# Patient Record
Sex: MALE | Race: WHITE | Employment: OTHER | URBAN - METROPOLITAN AREA
[De-identification: names, ages, dates, MRNs, and addresses within clinical notes are randomized per-mention and may not be internally consistent; named-entity substitution may affect disease eponyms.]

---

## 2019-10-28 ENCOUNTER — OFFICE VISIT (OUTPATIENT)
Dept: ENT CLINIC | Age: 64
End: 2019-10-28
Payer: MEDICARE

## 2019-10-28 VITALS
SYSTOLIC BLOOD PRESSURE: 153 MMHG | HEART RATE: 89 BPM | TEMPERATURE: 99.2 F | BODY MASS INDEX: 34.07 KG/M2 | WEIGHT: 243.4 LBS | DIASTOLIC BLOOD PRESSURE: 93 MMHG | HEIGHT: 71 IN

## 2019-10-28 DIAGNOSIS — D37.030 NEOPLASM OF UNCERTAIN BEHAVIOR OF PAROTID GLAND: Primary | ICD-10-CM

## 2019-10-28 PROCEDURE — G8484 FLU IMMUNIZE NO ADMIN: HCPCS | Performed by: OTOLARYNGOLOGY

## 2019-10-28 PROCEDURE — 1123F ACP DISCUSS/DSCN MKR DOCD: CPT | Performed by: OTOLARYNGOLOGY

## 2019-10-28 PROCEDURE — 99203 OFFICE O/P NEW LOW 30 MIN: CPT | Performed by: OTOLARYNGOLOGY

## 2019-10-28 PROCEDURE — G8427 DOCREV CUR MEDS BY ELIG CLIN: HCPCS | Performed by: OTOLARYNGOLOGY

## 2019-10-28 PROCEDURE — 1036F TOBACCO NON-USER: CPT | Performed by: OTOLARYNGOLOGY

## 2019-10-28 PROCEDURE — 3017F COLORECTAL CA SCREEN DOC REV: CPT | Performed by: OTOLARYNGOLOGY

## 2019-10-28 PROCEDURE — 4040F PNEUMOC VAC/ADMIN/RCVD: CPT | Performed by: OTOLARYNGOLOGY

## 2019-10-28 PROCEDURE — G8417 CALC BMI ABV UP PARAM F/U: HCPCS | Performed by: OTOLARYNGOLOGY

## 2019-10-28 PROCEDURE — 10021 FNA BX W/O IMG GDN 1ST LES: CPT | Performed by: OTOLARYNGOLOGY

## 2019-10-28 RX ORDER — PRAVASTATIN SODIUM 40 MG
40 TABLET ORAL
COMMUNITY
Start: 2019-09-27

## 2019-10-28 RX ORDER — LISINOPRIL AND HYDROCHLOROTHIAZIDE 20; 12.5 MG/1; MG/1
2 TABLET ORAL
COMMUNITY
Start: 2019-09-27 | End: 2021-11-12

## 2019-11-01 ENCOUNTER — TELEPHONE (OUTPATIENT)
Dept: ENT CLINIC | Age: 64
End: 2019-11-01

## 2020-08-09 ENCOUNTER — HOSPITAL ENCOUNTER (EMERGENCY)
Age: 65
Discharge: HOME | End: 2020-08-09
Attending: EMERGENCY MEDICINE
Payer: MEDICARE

## 2020-08-09 ENCOUNTER — APPOINTMENT (OUTPATIENT)
Dept: CT IMAGING | Age: 65
End: 2020-08-09
Payer: MEDICARE

## 2020-08-09 VITALS
HEART RATE: 70 BPM | TEMPERATURE: 98.5 F | DIASTOLIC BLOOD PRESSURE: 81 MMHG | RESPIRATION RATE: 19 BRPM | SYSTOLIC BLOOD PRESSURE: 154 MMHG | OXYGEN SATURATION: 99 %

## 2020-08-09 LAB
ANION GAP SERPL CALCULATED.3IONS-SCNC: 12 MMOL/L (ref 3–16)
BASOPHILS ABSOLUTE: 0.1 K/UL (ref 0–0.2)
BASOPHILS RELATIVE PERCENT: 0.5 %
BUN BLDV-MCNC: 20 MG/DL (ref 7–20)
CALCIUM SERPL-MCNC: 9.3 MG/DL (ref 8.3–10.6)
CHLORIDE BLD-SCNC: 95 MMOL/L (ref 99–110)
CO2: 28 MMOL/L (ref 21–32)
CREAT SERPL-MCNC: 1 MG/DL (ref 0.8–1.3)
EOSINOPHILS ABSOLUTE: 0 K/UL (ref 0–0.6)
EOSINOPHILS RELATIVE PERCENT: 0 %
GFR AFRICAN AMERICAN: >60
GFR NON-AFRICAN AMERICAN: >60
GLUCOSE BLD-MCNC: 160 MG/DL (ref 70–99)
HCT VFR BLD CALC: 46.4 % (ref 40.5–52.5)
HEMOGLOBIN: 16 G/DL (ref 13.5–17.5)
LYMPHOCYTES ABSOLUTE: 1.7 K/UL (ref 1–5.1)
LYMPHOCYTES RELATIVE PERCENT: 14.2 %
MCH RBC QN AUTO: 31.4 PG (ref 26–34)
MCHC RBC AUTO-ENTMCNC: 34.5 G/DL (ref 31–36)
MCV RBC AUTO: 91 FL (ref 80–100)
MONOCYTES ABSOLUTE: 0.5 K/UL (ref 0–1.3)
MONOCYTES RELATIVE PERCENT: 4 %
NEUTROPHILS ABSOLUTE: 9.7 K/UL (ref 1.7–7.7)
NEUTROPHILS RELATIVE PERCENT: 81.3 %
PDW BLD-RTO: 13.7 % (ref 12.4–15.4)
PLATELET # BLD: 267 K/UL (ref 135–450)
PMV BLD AUTO: 7.8 FL (ref 5–10.5)
POTASSIUM REFLEX MAGNESIUM: 3.7 MMOL/L (ref 3.5–5.1)
RBC # BLD: 5.1 M/UL (ref 4.2–5.9)
SODIUM BLD-SCNC: 135 MMOL/L (ref 136–145)
WBC # BLD: 12 K/UL (ref 4–11)

## 2020-08-09 PROCEDURE — 70487 CT MAXILLOFACIAL W/DYE: CPT

## 2020-08-09 PROCEDURE — 36415 COLL VENOUS BLD VENIPUNCTURE: CPT

## 2020-08-09 PROCEDURE — 6360000004 HC RX CONTRAST MEDICATION: Performed by: EMERGENCY MEDICINE

## 2020-08-09 PROCEDURE — 99284 EMERGENCY DEPT VISIT MOD MDM: CPT

## 2020-08-09 PROCEDURE — 85025 COMPLETE CBC W/AUTO DIFF WBC: CPT

## 2020-08-09 PROCEDURE — 80048 BASIC METABOLIC PNL TOTAL CA: CPT

## 2020-08-09 PROCEDURE — 6360000002 HC RX W HCPCS: Performed by: EMERGENCY MEDICINE

## 2020-08-09 PROCEDURE — 96374 THER/PROPH/DIAG INJ IV PUSH: CPT

## 2020-08-09 RX ORDER — ONDANSETRON 2 MG/ML
4 INJECTION INTRAMUSCULAR; INTRAVENOUS EVERY 6 HOURS PRN
Status: DISCONTINUED | OUTPATIENT
Start: 2020-08-09 | End: 2020-08-09 | Stop reason: HOSPADM

## 2020-08-09 RX ORDER — KETOROLAC TROMETHAMINE 30 MG/ML
15 INJECTION, SOLUTION INTRAMUSCULAR; INTRAVENOUS ONCE
Status: COMPLETED | OUTPATIENT
Start: 2020-08-09 | End: 2020-08-09

## 2020-08-09 RX ORDER — NAPROXEN 500 MG/1
500 TABLET ORAL 2 TIMES DAILY WITH MEALS
Qty: 14 TABLET | Refills: 0 | Status: ON HOLD | OUTPATIENT
Start: 2020-08-09 | End: 2022-01-26

## 2020-08-09 RX ADMIN — KETOROLAC TROMETHAMINE 15 MG: 30 INJECTION, SOLUTION INTRAMUSCULAR at 06:20

## 2020-08-09 RX ADMIN — IOPAMIDOL 80 ML: 755 INJECTION, SOLUTION INTRAVENOUS at 07:43

## 2020-08-09 RX ADMIN — ONDANSETRON 4 MG: 2 INJECTION INTRAMUSCULAR; INTRAVENOUS at 06:21

## 2020-08-09 ASSESSMENT — ENCOUNTER SYMPTOMS
WHEEZING: 0
DIARRHEA: 0
NAUSEA: 1
COUGH: 0
FACIAL SWELLING: 1
SHORTNESS OF BREATH: 0
ABDOMINAL PAIN: 0
VOMITING: 1
EYE PAIN: 0

## 2020-08-09 ASSESSMENT — PAIN DESCRIPTION - ORIENTATION: ORIENTATION: LEFT

## 2020-08-09 ASSESSMENT — PAIN DESCRIPTION - PAIN TYPE: TYPE: ACUTE PAIN

## 2020-08-09 ASSESSMENT — PAIN DESCRIPTION - LOCATION: LOCATION: FACE

## 2020-08-09 ASSESSMENT — PAIN SCALES - GENERAL
PAINLEVEL_OUTOF10: 9
PAINLEVEL_OUTOF10: 9

## 2020-08-09 NOTE — ED NOTES
Patient prepared for and ready to be discharged. Patient discharged at this time in no acute distress after verbalizing understanding of discharge instructions. Patient left after receiving After Visit Summary instructions.         Jose Alejandro Redding RN  08/09/20 1824

## 2020-08-09 NOTE — ED TRIAGE NOTES
Patient presents to ED with complaints of \"facial tumor pain\". Patient states he has a \"tumor\" on the left side of his face that he was worked up for last year - which was benign. Patient complains of pain that began last night as a headache. Patient states he took tylenol without relief.

## 2020-08-09 NOTE — ED PROVIDER NOTES
[address] [facility] ENCOUNTER          ATTENDING PHYSICIAN NOTE       Date of evaluation: 8/9/2020    ADDENDUM:      Care of this patient was assumed from Dr. Joy Giles. The patient was seen for Facial Pain  . The patient's initial evaluation and plan have been discussed with the prior provider who initially evaluated the patient. Nursing Notes, Past Medical Hx, Past Surgical Hx, Social Hx, Allergies, and Family Hx were all reviewed. Diagnostic Results       RADIOLOGY:  CT MAXILLOFACIAL W CONTRAST   Final Result   1. Increased number of normal-sized lymph nodes in the neck, left greater than right. 2. Left parotid superficial lobe 3 cm mass, indeterminate as to whether this represents a benign   Tumor or malignancy. Correlation with prior studies available or PET imaging is recommended in light of the adenopathy.           LABS:   Results for orders placed or performed during the hospital encounter of 11/03/97   Basic Metabolic Panel w/ Reflex to MG   Result Value Ref Range    Sodium 135 (L) 136 - 145 mmol/L    Potassium reflex Magnesium 3.7 3.5 - 5.1 mmol/L    Chloride 95 (L) 99 - 110 mmol/L    CO2 28 21 - 32 mmol/L    Anion Gap 12 3 - 16    Glucose 160 (H) 70 - 99 mg/dL    BUN 20 7 - 20 mg/dL    CREATININE 1.0 0.8 - 1.3 mg/dL    GFR Non-African American >60 >60    GFR African American >60 >60    Calcium 9.3 8.3 - 10.6 mg/dL   CBC Auto Differential   Result Value Ref Range    WBC 12.0 (H) 4.0 - 11.0 K/uL    RBC 5.10 4.20 - 5.90 M/uL    Hemoglobin 16.0 13.5 - 17.5 g/dL    Hematocrit 46.4 40.5 - 52.5 %    MCV 91.0 80.0 - 100.0 fL    MCH 31.4 26.0 - 34.0 pg    MCHC 34.5 31.0 - 36.0 g/dL    RDW 13.7 12.4 - 15.4 %    Platelets 747 687 - 316 K/uL    MPV 7.8 5.0 - 10.5 fL    Neutrophils % 81.3 %    Lymphocytes % 14.2 %    Monocytes % 4.0 %    Eosinophils % 0.0 %    Basophils % 0.5 %    Neutrophils Absolute 9.7 (H) 1.7 - 7.7 K/uL    Lymphocytes Absolute 1.7 1.0 - 5.1 K/uL    Monocytes Absolute 0.5

## 2020-08-09 NOTE — ED PROVIDER NOTES
[address] [facility]          ATTENDING PHYSICIAN NOTE       Date of evaluation: 8/9/2020    Chief Complaint     Facial Pain      History of Present Illness     Lidia Reese is a 77 y.o. male who presents with chief complaint of facial pain. Patient states he had a growth on the left side of his face which was previously seen by Dr. Scott Blanco with ENT and diagnosed as being a benign tumor. The patient did not undergo surgical resection at that time as he had no discomfort and is not particularly troubled him. He reports the past several days he states that the tumor has enlarged in size which is the first time that has ever happened and it is now painful. Has never been painful previously. He describes a throbbing pain located at the left angle of the mandible which is constant in nature and moderate to severe in intensity. There is no associated fever or chills. No drooling. It is not gotten any better with Advil at home. Did have some nausea and threw up the Tylenol he took this evening. Review of Systems     Review of Systems   Constitutional: Negative for chills and fever. HENT: Positive for facial swelling. Negative for congestion. Eyes: Negative for pain. Respiratory: Negative for cough, shortness of breath and wheezing. Cardiovascular: Negative for chest pain and leg swelling. Gastrointestinal: Positive for nausea and vomiting. Negative for abdominal pain and diarrhea. Genitourinary: Negative for dysuria. Musculoskeletal: Negative for arthralgias. Skin: Negative for rash. Neurological: Negative for weakness and headaches. All other systems reviewed and are negative. Past Medical, Surgical, Family, and Social History         Diagnosis Date    Diabetes mellitus ([address]     Hypertension     Rash      No past surgical history on file. His family history includes Diabetes in his mother; No Known Problems in his father.   He reports that he has never smoked. He has never used smokeless tobacco. He reports current alcohol use. He reports that he does not use drugs. Medications     Previous Medications    LISINOPRIL-HYDROCHLOROTHIAZIDE (PRINZIDE;ZESTORETIC) 20-12.5 MG PER TABLET    Take 2 tablets by mouth    METFORMIN (GLUCOPHAGE) 500 MG TABLET    Take 500 mg by mouth    PRAVASTATIN (PRAVACHOL) 20 MG TABLET    Take 20 mg by mouth       Allergies     He has No Known Allergies. Physical Exam     ED Triage Vitals [08/09/20 0606]   Enc Vitals Group      BP (!) 171/98      Pulse 85      Resp 17      Temp 98.5 °F (36.9 °C)      Temp Source Oral      SpO2 97 %      Weight       Height       Head Circumference       Peak Flow       Pain Score       Pain Loc       Pain Edu? Excl. in [address]? General:  Non-toxic, no acute distress, fully cooperative with my exam    HEENT:  NCAT, there is a swelling of the left angle of the mandible with some very mild tenderness but the mass seems firm with no fluctuance and not significantly warm or indurated, the oropharynx is benign, TMs normal bilaterally, no trismus    Neck:  Supple    Pulmonary:   No increased work of breathing; CTAB    Cardiac:  RRR, no murmur, thrill or gallop. Capillary refill <3s. 2+ distal pulses    Abdomen:  Soft, nontender, nondistended; no focal rebound or guarding    Musculoskeletal:  Grossly intact without obvious injury or deformity    Neuro: Cranial nerves II 12 intact    Skin: No rashes      Diagnostic Results     EKG       RADIOLOGY:  CT MAXILLOFACIAL W CONTRAST    (Results Pending)       LABS:   No results found for this visit on 08/09/20. ED BEDSIDE ULTRASOUND:      RECENT VITALS:  BP: (!) 171/98, Temp: 98.5 °F (36.9 °C), Pulse: 85, Resp: 17, SpO2: 97 %     Procedures         ED Course     Nursing Notes, Past Medical Hx, Past Surgical Hx, Social Hx, Allergies, and Family Hx were reviewed.     The patient was given the following medications:  Orders Placed This Encounter Medications    ketorolac (TORADOL) injection 15 mg       CONSULTS:  None    MEDICAL DECISION MAKING / ASSESSMENT / PLAN     Maida Holder is a 77 y.o. male with known benign left parotid gland tumor who presents with increased pain and swelling. Given that he has increased pain and swelling will obtain a CT scan to ensure there is no evidence of infection or abscess. Turned over to Dr. Zachery Morrison pending this. He overall appears well and there is no evidence of airway compromise. No signs of cellulitis on the face and and low suspicion for odontogenic infection. Clinical Impression     Facial Pain    Disposition     PATIENT REFERRED TO:  No follow-up provider specified.     DISCHARGE MEDICATIONS:  New Prescriptions    No medications on file       [address], [place]  08/09/20 4927

## 2020-08-10 ENCOUNTER — OFFICE VISIT (OUTPATIENT)
Dept: ENT CLINIC | Age: 65
End: 2020-08-10
Payer: MEDICARE

## 2020-08-10 VITALS
WEIGHT: 243 LBS | BODY MASS INDEX: 34.02 KG/M2 | HEIGHT: 71 IN | DIASTOLIC BLOOD PRESSURE: 89 MMHG | SYSTOLIC BLOOD PRESSURE: 154 MMHG | TEMPERATURE: 97.4 F | HEART RATE: 72 BPM

## 2020-08-10 PROCEDURE — 1036F TOBACCO NON-USER: CPT | Performed by: OTOLARYNGOLOGY

## 2020-08-10 PROCEDURE — G8417 CALC BMI ABV UP PARAM F/U: HCPCS | Performed by: OTOLARYNGOLOGY

## 2020-08-10 PROCEDURE — 1123F ACP DISCUSS/DSCN MKR DOCD: CPT | Performed by: OTOLARYNGOLOGY

## 2020-08-10 PROCEDURE — 99212 OFFICE O/P EST SF 10 MIN: CPT | Performed by: OTOLARYNGOLOGY

## 2020-08-10 PROCEDURE — 4040F PNEUMOC VAC/ADMIN/RCVD: CPT | Performed by: OTOLARYNGOLOGY

## 2020-08-10 PROCEDURE — 3017F COLORECTAL CA SCREEN DOC REV: CPT | Performed by: OTOLARYNGOLOGY

## 2020-08-10 PROCEDURE — G8427 DOCREV CUR MEDS BY ELIG CLIN: HCPCS | Performed by: OTOLARYNGOLOGY

## 2020-08-10 RX ORDER — OXYCODONE HYDROCHLORIDE AND ACETAMINOPHEN 5; 325 MG/1; MG/1
1 TABLET ORAL EVERY 6 HOURS PRN
Qty: 20 TABLET | Refills: 0 | Status: SHIPPED | OUTPATIENT
Start: 2020-08-10 | End: 2020-08-15

## 2020-08-10 NOTE — PROGRESS NOTES
normal.  FACIAL MUSCLES: All branches of the facial nerve intact. FACE PALPATION: Zygomatic arches and orbital rims are intact. No tenderness over the sinuses. EXTRAOCULAR MUSCLES: Intact with full range of motion. OTOSCOPY:  Normal tympanic membranes, middle ear spaces, and external auditory canals. TUNING FORKS:  Rinne ++ Rollins midline at 512 Hz  INTRANASAL:  Septum midline, turbinates normal, meati clear. LIPS, TEETH, GINGIVA:  Normal mucosa  PHARYNX:  Normal  NECK:  No masses. LYMPH NODES: No cervical lymphadenopathy. SALIVARY GLANDS: Large mass of the left parotid gland. THYROID: No goiter or thyroid nodules palpable. 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 PLAN: For resection of the left parotid tumor with facial nerve dissection and preservation. Patient accepts the risk of postoperative facial paresis. FOLLOW-UP: At surgery.

## 2020-08-11 ENCOUNTER — TELEPHONE (OUTPATIENT)
Dept: ENT CLINIC | Age: 65
End: 2020-08-11

## 2020-08-11 NOTE — PROGRESS NOTES
[facility] [employer] / [employer] [address] [facility], [facility]    Acknowledgment of Informed Consent for Surgical or Medical Procedure and Sedation  I agree to allow doctor(s) GRECIA COLVIN and his/her associates or assistants, including residents and/or other qualified medical practitioner to perform the following medical treatment or procedure and to administer or direct the administration of sedation as necessary:  Procedure(s): LEFT SUPERFICIAL [address]  My doctor has explained the following regarding the proposed procedure:   the explanation of the procedure   the benefits of the procedure   the potential problems that might occur during recuperation   the risks and side effects of the procedure which could include but are not limited to severe blood loss, infection, stroke or death   the benefits, risks and side effect of alternative procedures including the consequences of declining this procedure or any alternative procedures   the likelihood of achieving satisfactory results. I acknowledge no guarantee or assurance has been made to me regarding the results. I understand that during the course of this treatment/procedure, unforeseen conditions can occur which require an additional or different procedure. I agree to allow my physician or assistants to perform such extension of the original procedure as they may find necessary. I understand that sedation will often result in temporary impairment of memory and fine motor skills and that sedation can occasionally progress to a state of deep sedation or general anesthesia. I understand the risks of anesthesia for surgery include, but are not limited to, sore throat, hoarseness, injury to face, mouth, or teeth; nausea; headache; injury to blood vessels or nerves; death, brain damage, or paralysis.     I understand that if I have a Limitation of Treatment order in effect during my hospitalization, the order may or may not be in effect during this procedure. I give my doctor permission to give me blood or blood products. I understand that there are risks with receiving blood such as hepatitis, AIDS, fever, or allergic reaction. I acknowledge that the risks, benefits, and alternatives of this treatment have been explained to me and that no express or implied warranty has been given by the hospital, any blood bank, or any person or entity as to the blood or blood components transfused. At the discretion of my doctor, I agree to allow observers, equipment/product representatives and allow photographing, and/or televising of the procedure, provided my name or identity is maintained confidentially. I agree the hospital may dispose of or use for scientific or educational purposes any tissue, fluid, or body parts which may be removed.     ________________________________Date________Time______ am/pm  ([facility])  Patient or Signature of Closest Relative or Legal Guardian    ________________________________Date________Time______am/pm      Page 1 of  1  Witness

## 2020-08-14 ENCOUNTER — NURSE ONLY (OUTPATIENT)
Dept: PRIMARY CARE CLINIC | Age: 65
End: 2020-08-14
Payer: MEDICARE

## 2020-08-14 PROCEDURE — 99211 OFF/OP EST MAY X REQ PHY/QHP: CPT | Performed by: NURSE PRACTITIONER

## 2020-08-15 LAB — SARS-COV-2, NAA: NOT DETECTED

## 2020-08-17 ENCOUNTER — TELEPHONE (OUTPATIENT)
Dept: ENT CLINIC | Age: 65
End: 2020-08-17

## 2020-08-17 NOTE — TELEPHONE ENCOUNTER
08/17/2020 Spoke with pt aware surgery time is being moved up.  Pt aware will need to arrive at 5:30am and surgery scheduled for 7:30am.

## 2020-08-18 ENCOUNTER — TELEPHONE (OUTPATIENT)
Dept: ENT CLINIC | Age: 65
End: 2020-08-18

## 2020-08-19 ENCOUNTER — ANESTHESIA EVENT (OUTPATIENT)
Dept: OPERATING ROOM | Age: 65
End: 2020-08-19
Payer: MEDICARE

## 2020-08-19 NOTE — PROGRESS NOTES

## 2020-08-20 ENCOUNTER — ANESTHESIA (OUTPATIENT)
Dept: OPERATING ROOM | Age: 65
End: 2020-08-20
Payer: MEDICARE

## 2020-08-20 ENCOUNTER — HOSPITAL ENCOUNTER (OUTPATIENT)
Age: 65
Setting detail: OUTPATIENT SURGERY
Discharge: HOME | End: 2020-08-20
Attending: OTOLARYNGOLOGY | Admitting: OTOLARYNGOLOGY
Payer: MEDICARE

## 2020-08-20 VITALS
DIASTOLIC BLOOD PRESSURE: 97 MMHG | HEART RATE: 71 BPM | WEIGHT: 243 LBS | TEMPERATURE: 98.5 F | HEIGHT: 71 IN | OXYGEN SATURATION: 96 % | SYSTOLIC BLOOD PRESSURE: 161 MMHG | RESPIRATION RATE: 18 BRPM | BODY MASS INDEX: 34.02 KG/M2

## 2020-08-20 LAB
GLUCOSE BLD-MCNC: 114 MG/DL (ref 70–99)
PERFORMED ON: ABNORMAL

## 2020-08-20 PROCEDURE — 2580000003 HC RX 258: Performed by: ANESTHESIOLOGY

## 2020-08-20 RX ORDER — LABETALOL 20 MG/4 ML (5 MG/ML) INTRAVENOUS SYRINGE
5 EVERY 10 MIN PRN
Status: CANCELLED | OUTPATIENT
Start: 2020-08-20

## 2020-08-20 RX ORDER — SODIUM CHLORIDE 0.9 % (FLUSH) 0.9 %
10 SYRINGE (ML) INJECTION PRN
Status: DISCONTINUED | OUTPATIENT
Start: 2020-08-20 | End: 2020-08-20 | Stop reason: HOSPADM

## 2020-08-20 RX ORDER — HYDRALAZINE HYDROCHLORIDE 20 MG/ML
5 INJECTION INTRAMUSCULAR; INTRAVENOUS EVERY 5 MIN PRN
Status: CANCELLED | OUTPATIENT
Start: 2020-08-20

## 2020-08-20 RX ORDER — LIDOCAINE HYDROCHLORIDE 10 MG/ML
1 INJECTION, SOLUTION EPIDURAL; INFILTRATION; INTRACAUDAL; PERINEURAL
Status: DISCONTINUED | OUTPATIENT
Start: 2020-08-20 | End: 2020-08-20 | Stop reason: HOSPADM

## 2020-08-20 RX ORDER — ENALAPRILAT 2.5 MG/2ML
1.25 INJECTION INTRAVENOUS
Status: CANCELLED | OUTPATIENT
Start: 2020-08-20 | End: 2020-08-20

## 2020-08-20 RX ORDER — SODIUM CHLORIDE, SODIUM LACTATE, POTASSIUM CHLORIDE, CALCIUM CHLORIDE 600; 310; 30; 20 MG/100ML; MG/100ML; MG/100ML; MG/100ML
INJECTION, SOLUTION INTRAVENOUS CONTINUOUS
Status: DISCONTINUED | OUTPATIENT
Start: 2020-08-20 | End: 2020-08-20 | Stop reason: HOSPADM

## 2020-08-20 RX ORDER — FENTANYL CITRATE 50 UG/ML
50 INJECTION, SOLUTION INTRAMUSCULAR; INTRAVENOUS EVERY 5 MIN PRN
Status: CANCELLED | OUTPATIENT
Start: 2020-08-20

## 2020-08-20 RX ORDER — SODIUM CHLORIDE 0.9 % (FLUSH) 0.9 %
10 SYRINGE (ML) INJECTION EVERY 12 HOURS SCHEDULED
Status: DISCONTINUED | OUTPATIENT
Start: 2020-08-20 | End: 2020-08-20 | Stop reason: HOSPADM

## 2020-08-20 RX ORDER — FENTANYL CITRATE 50 UG/ML
25 INJECTION, SOLUTION INTRAMUSCULAR; INTRAVENOUS EVERY 5 MIN PRN
Status: CANCELLED | OUTPATIENT
Start: 2020-08-20

## 2020-08-20 RX ORDER — ONDANSETRON 2 MG/ML
4 INJECTION INTRAMUSCULAR; INTRAVENOUS
Status: CANCELLED | OUTPATIENT
Start: 2020-08-20 | End: 2020-08-20

## 2020-08-20 RX ORDER — SODIUM CHLORIDE 9 MG/ML
INJECTION, SOLUTION INTRAVENOUS CONTINUOUS
Status: DISCONTINUED | OUTPATIENT
Start: 2020-08-20 | End: 2020-08-20 | Stop reason: HOSPADM

## 2020-08-20 RX ADMIN — SODIUM CHLORIDE, POTASSIUM CHLORIDE, SODIUM LACTATE AND CALCIUM CHLORIDE: 600; 310; 30; 20 INJECTION, SOLUTION INTRAVENOUS at 06:31

## 2020-08-20 ASSESSMENT — PAIN - FUNCTIONAL ASSESSMENT: PAIN_FUNCTIONAL_ASSESSMENT: 0-10

## 2020-08-20 NOTE — PROGRESS NOTES
Patient is alert and oriented x 4, no surgical history in the past. 18 ga IV KVO right FA. PA and Dr. Ryan Angeles have seen this patient.

## 2020-08-20 NOTE — H&P
Nicole Lazo    [id number]    [facility] [employer] Same Day Surgery Update H & P  Department of General Surgery   Surgical Service   Pre-operative History and Physical  Last H & P within the last 30 days. DIAGNOSIS:   Tumor of parotid gland [D49.0]    PROCEDURE:  [place] EXC PAROTD LESN,LATER LOBE [50718] (LEFT SUPERFIVCIAL PAROTIDECTOMY WITH FACIAL NERVE DISSECTION)      HISTORY OF PRESENT ILLNESS:   Patient has an enlarged parotid gland     Covid 19:  Patient denies fever, chills, cough or known exposure to Covid-19. Patient reports they have not been quarantined at home since Covid-19 test.      Past Medical History:        Diagnosis Date    Diabetes mellitus ([address]     Hypertension     Rash      Past Surgical History:    History reviewed. No pertinent surgical history.   Past Social History:  Social History     Socioeconomic History    Marital status:      Spouse name: None    Number of children: None    Years of education: None    Highest education level: None   Occupational History    None   Social Needs    Financial resource strain: None    Food insecurity     Worry: None     Inability: None    Transportation needs     Medical: None     Non-medical: None   Tobacco Use    Smoking status: Never Smoker    Smokeless tobacco: Never Used   Substance and Sexual Activity    Alcohol use: Yes     Comment: occassionally    Drug use: No    Sexual activity: None   Lifestyle    Physical activity     Days per week: None     Minutes per session: None    Stress: None   Relationships    Social connections     Talks on phone: None     Gets together: None     Attends Yazdanism service: None     Active member of club or organization: None     Attends meetings of clubs or organizations: None     Relationship status: None    Intimate partner violence     Fear of current or ex partner: None     Emotionally abused: None     Physically abused: None     Forced sexual activity: None   Other Topics Concern    None

## 2020-08-20 NOTE — PROGRESS NOTES
Patient spoke with Dr. Radha Van and has decided to not have surgery today. IV discontinued. Dr. Radha Van spoke to patient's wife.

## 2020-08-20 NOTE — PROGRESS NOTES
Visited patient for preop visit. He expressed great anxiety about the procedure. After long discussion we have agreed to defer surgery at this time.

## 2020-08-20 NOTE — ANESTHESIA PRE PROCEDURE
Department of Anesthesiology  Preprocedure Note       Name:  Awilda Anaya   Age:  77 y.o.  :  1955                                          MRN:  [id number]         Date:  2020      Surgeon: Rohan Calero): Charlotte Aparicio MD    Procedure: Procedure(s):  LEFT SUPERFIVCIAL PAROTIDECTOMY WITH FACIAL NERVE DISSECTION    Medications prior to admission:   Prior to Admission medications    Medication Sig Start Date End Date Taking? Authorizing Provider   naproxen (NAPROSYN) 500 MG tablet Take 1 tablet by mouth 2 times daily (with meals) 20  Yes Floyd Baker MD   pravastatin (PRAVACHOL) 20 MG tablet Take 20 mg by mouth 19  Yes Historical Provider, MD   lisinopril-hydrochlorothiazide (PRINZIDE;ZESTORETIC) 20-12.5 MG per tablet Take 2 tablets by mouth 19  Yes Historical Provider, MD   metFORMIN (GLUCOPHAGE) 500 MG tablet Take 500 mg by mouth 19  Yes Historical Provider, MD       Current medications:    Current Facility-Administered Medications   Medication Dose Route Frequency Provider Last Rate Last Dose    sodium chloride flush 0.9 % injection 10 mL  10 mL Intravenous 2 times per day Meldon [place], DO        sodium chloride flush 0.9 % injection 10 mL  10 mL Intravenous PRN Meldon Lisa, DO        0.9 % sodium chloride infusion   Intravenous Continuous Meldon [place], DO        lactated ringers infusion   Intravenous Continuous Meldon [place],  mL/hr at 20 0631      lactated ringers infusion   Intravenous Continuous Theresa Suarez MD        sodium chloride flush 0.9 % injection 10 mL  10 mL Intravenous 2 times per day Theresa Suarez MD        sodium chloride flush 0.9 % injection 10 mL  10 mL Intravenous PRN Theresa Suarez MD        lidocaine PF 1 % injection 1 mL  1 mL Intradermal Once PRN Theresa Suarez MD           Allergies:  No Known Allergies    Problem List:  There is no problem list on file for this patient.       Past Medical History:        Diagnosis Date    Diabetes mellitus ([address]     Hypertension     Rash        Past Surgical History:  History reviewed. No pertinent surgical history. Social History:    Social History     Tobacco Use    Smoking status: Never Smoker    Smokeless tobacco: Never Used   Substance Use Topics    Alcohol use: Yes     Comment: occassionally                                Counseling given: Not Answered      Vital Signs (Current):   Vitals:    08/20/20 0548   BP: (!) 161/97   Pulse: 71   Resp: 18   Temp: 98.5 °F (36.9 °C)   TempSrc: Oral   SpO2: 96%   Weight: 243 lb (110.2 kg)   Height: 5' 11\" (1.803 m)                                              BP Readings from Last 3 Encounters:   08/20/20 (!) 161/97   08/10/20 (!) 154/89   08/09/20 (!) 154/81       NPO Status: Time of last liquid consumption: 0130                        Time of last solid consumption: 1900                        Date of last liquid consumption: 08/20/20                        Date of last solid food consumption: 08/19/20    BMI:   Wt Readings from Last 3 Encounters:   08/20/20 243 lb (110.2 kg)   08/10/20 243 lb (110.2 kg)   10/28/19 243 lb 6.4 oz (110.4 kg)     Body mass index is 33.89 kg/m². CBC:   Lab Results   Component Value Date    WBC 12.0 08/09/2020    RBC 5.10 08/09/2020    HGB 16.0 08/09/2020    HCT 46.4 08/09/2020    MCV 91.0 08/09/2020    RDW 13.7 08/09/2020     08/09/2020       CMP:   Lab Results   Component Value Date     08/09/2020    K 3.7 08/09/2020    CL 95 08/09/2020    CO2 28 08/09/2020    BUN 20 08/09/2020    CREATININE 1.0 08/09/2020    GFRAA >60 08/09/2020    LABGLOM >60 08/09/2020    GLUCOSE 160 08/09/2020    CALCIUM 9.3 08/09/2020       POC Tests: No results for input(s): POCGLU, POCNA, POCK, POCCL, POCBUN, POCHEMO, POCHCT in the last 72 hours.     Coags: No results found for: PROTIME, INR, APTT    HCG (If Applicable): No results found for: PREGTESTUR, PREGSERUM, HCG, HCGQUANT     ABGs: No results found for: PHART, PO2ART, JNK6TDA, ITJ6IDJ, BEART, Y3VWORHM     Type & Screen (If Applicable):  No results found for: LABABO, LABRH    Drug/Infectious Status (If Applicable):  No results found for: HIV, HEPCAB    COVID-19 Screening (If Applicable):   Lab Results   Component Value Date    COVID19 NOT DETECTED 08/14/2020         Anesthesia Evaluation  Patient summary reviewed and Nursing notes reviewed no history of anesthetic complications:   Airway: Mallampati: III  TM distance: >3 FB   Neck ROM: full  Mouth opening: > = 3 FB Dental: normal exam         Pulmonary:Negative Pulmonary ROS                              Cardiovascular:    (+) hypertension:,                   Neuro/Psych:   Negative Neuro/Psych ROS              GI/Hepatic/Renal: Neg GI/Hepatic/Renal ROS            Endo/Other:    (+) Diabetes, . Abdominal:           Vascular: negative vascular ROS. Anesthesia Plan      general     ASA 2       Induction: intravenous. Anesthetic plan and risks discussed with patient.                       Louann Gomez MD   8/20/2020

## 2020-09-23 ENCOUNTER — TELEPHONE (OUTPATIENT)
Dept: ENT CLINIC | Age: 65
End: 2020-09-23

## 2020-09-23 ENCOUNTER — OFFICE VISIT (OUTPATIENT)
Dept: ENT CLINIC | Age: 65
End: 2020-09-23
Payer: MEDICARE

## 2020-09-23 VITALS — HEART RATE: 71 BPM | SYSTOLIC BLOOD PRESSURE: 137 MMHG | TEMPERATURE: 96.9 F | DIASTOLIC BLOOD PRESSURE: 83 MMHG

## 2020-09-23 PROCEDURE — 3017F COLORECTAL CA SCREEN DOC REV: CPT | Performed by: OTOLARYNGOLOGY

## 2020-09-23 PROCEDURE — 99212 OFFICE O/P EST SF 10 MIN: CPT | Performed by: OTOLARYNGOLOGY

## 2020-09-23 PROCEDURE — 1036F TOBACCO NON-USER: CPT | Performed by: OTOLARYNGOLOGY

## 2020-09-23 PROCEDURE — G8427 DOCREV CUR MEDS BY ELIG CLIN: HCPCS | Performed by: OTOLARYNGOLOGY

## 2020-09-23 PROCEDURE — 4040F PNEUMOC VAC/ADMIN/RCVD: CPT | Performed by: OTOLARYNGOLOGY

## 2020-09-23 PROCEDURE — G8417 CALC BMI ABV UP PARAM F/U: HCPCS | Performed by: OTOLARYNGOLOGY

## 2020-09-23 PROCEDURE — 1123F ACP DISCUSS/DSCN MKR DOCD: CPT | Performed by: OTOLARYNGOLOGY

## 2020-09-23 NOTE — PROGRESS NOTES
FOLLOW UP VISIT:    CHIEF COMPLAINT: Left parotid mass. INTERIM HISTORY: Has been followed for 1 year with a mass in the left parotid. Cytology suggestive of a Warthin's tumor. Has been steadily growing. Scheduled for surgery but elected last-minute not to have it done. Has has been stable in size since that time. PAST MEDICAL HISTORY:   Social History     Tobacco Use   Smoking Status Never Smoker   Smokeless Tobacco Never Used                                                    Social History     Substance and Sexual Activity   Alcohol Use Yes    Comment: occassionally                                                    Current Outpatient Medications:     naproxen (NAPROSYN) 500 MG tablet, Take 1 tablet by mouth 2 times daily (with meals), Disp: 14 tablet, Rfl: 0    pravastatin (PRAVACHOL) 20 MG tablet, Take 20 mg by mouth, Disp: , Rfl:     lisinopril-hydrochlorothiazide (PRINZIDE;ZESTORETIC) 20-12.5 MG per tablet, Take 2 tablets by mouth, Disp: , Rfl:     metFORMIN (GLUCOPHAGE) 500 MG tablet, Take 500 mg by mouth, Disp: , Rfl:                                                  Past Medical History:   Diagnosis Date    Diabetes mellitus ([facility] [address]     Hypertension     Rash                                                   History reviewed. No pertinent surgical history. FAMILY HISTORY: Family history reviewed and except as pertinent to the interim history is not contributory. REVIEW OF SYSTEMS:  All pertinent positive and negative findings included in HPI. Otherwise, all other systems are reviewed and are negative    PHYSICAL EXAMINATION:   GENERAL: wdwn- no acute distress  RESPIRATORY: No stridor. COMMUNICATION :  Normal voice  MENTAL STATUS: Alert and oriented x3  HEAD AND FACE: No skin lesions of the face. EXTERNAL EARS AND NOSE: The external ears and nasal pyramid are normal.  FACIAL MUSCLES: All branches of the right facial nerve intact.   FACE PALPATION: Zygomatic arches and orbital rims are intact. No tenderness over the sinuses. EXTRAOCULAR MUSCLES: Intact with full range of motion. OTOSCOPY:  Normal tympanic membranes, middle ear spaces, and external auditory canals. TUNING FORKS:  Rinne ++ Rollins midline at 512 Hz  INTRANASAL:  Septum midline, turbinates normal, meati clear. LIPS, TEETH, GINGIVA:  Normal mucosa  PHARYNX:  Normal  NECK:  No masses. LYMPH NODES: No cervical lymphadenopathy. SALIVARY GLANDS: Large mobile firm painless mass of the left parotid. THYROID: No goiter or thyroid nodules palpable. IMPRESSION: Left parotid tumor. PLAN: Discussed superficial parotidectomy with dissection and preservation of the facial nerve using the nerve monitor. Risk of facial nerve weakness postop discussed. If the tumor is not removed, it may grow and cause facial nerve damage itself. FOLLOW-UP: Patient will consider and follow-up for surgery when he is ready.

## 2020-09-23 NOTE — TELEPHONE ENCOUNTER
Wife calling to ask if Dr. Jono Riley could give patient something to calm him down prior to his surgery to prevent panic attack.

## 2021-11-12 ENCOUNTER — OFFICE VISIT (OUTPATIENT)
Dept: INTERNAL MEDICINE CLINIC | Age: 66
End: 2021-11-12
Payer: MEDICARE

## 2021-11-12 ENCOUNTER — HOSPITAL ENCOUNTER (OUTPATIENT)
Dept: GENERAL RADIOLOGY | Age: 66
Discharge: HOME | End: 2021-11-12
Payer: MEDICARE

## 2021-11-12 ENCOUNTER — HOSPITAL ENCOUNTER (OUTPATIENT)
Age: 66
Discharge: HOME | End: 2021-11-12
Payer: MEDICARE

## 2021-11-12 VITALS
TEMPERATURE: 97.7 F | DIASTOLIC BLOOD PRESSURE: 96 MMHG | HEIGHT: 71 IN | BODY MASS INDEX: 33.6 KG/M2 | SYSTOLIC BLOOD PRESSURE: 138 MMHG | WEIGHT: 240 LBS | OXYGEN SATURATION: 95 % | HEART RATE: 79 BPM

## 2021-11-12 DIAGNOSIS — R05.9 COUGH: ICD-10-CM

## 2021-11-12 DIAGNOSIS — R05.9 COUGH: Primary | ICD-10-CM

## 2021-11-12 DIAGNOSIS — I10 PRIMARY HYPERTENSION: ICD-10-CM

## 2021-11-12 DIAGNOSIS — E11.9 TYPE 2 DIABETES MELLITUS WITHOUT COMPLICATION, WITHOUT LONG-TERM CURRENT USE OF INSULIN (HCC): ICD-10-CM

## 2021-11-12 PROCEDURE — 99204 OFFICE O/P NEW MOD 45 MIN: CPT | Performed by: INTERNAL MEDICINE

## 2021-11-12 PROCEDURE — 1036F TOBACCO NON-USER: CPT | Performed by: INTERNAL MEDICINE

## 2021-11-12 PROCEDURE — G8417 CALC BMI ABV UP PARAM F/U: HCPCS | Performed by: INTERNAL MEDICINE

## 2021-11-12 PROCEDURE — 3046F HEMOGLOBIN A1C LEVEL >9.0%: CPT | Performed by: INTERNAL MEDICINE

## 2021-11-12 PROCEDURE — 1123F ACP DISCUSS/DSCN MKR DOCD: CPT | Performed by: INTERNAL MEDICINE

## 2021-11-12 PROCEDURE — 4040F PNEUMOC VAC/ADMIN/RCVD: CPT | Performed by: INTERNAL MEDICINE

## 2021-11-12 PROCEDURE — G8484 FLU IMMUNIZE NO ADMIN: HCPCS | Performed by: INTERNAL MEDICINE

## 2021-11-12 PROCEDURE — 71046 X-RAY EXAM CHEST 2 VIEWS: CPT

## 2021-11-12 PROCEDURE — G8427 DOCREV CUR MEDS BY ELIG CLIN: HCPCS | Performed by: INTERNAL MEDICINE

## 2021-11-12 PROCEDURE — 3017F COLORECTAL CA SCREEN DOC REV: CPT | Performed by: INTERNAL MEDICINE

## 2021-11-12 PROCEDURE — 2022F DILAT RTA XM EVC RTNOPTHY: CPT | Performed by: INTERNAL MEDICINE

## 2021-11-12 RX ORDER — OMEPRAZOLE 20 MG/1
20 CAPSULE, DELAYED RELEASE ORAL
Qty: 90 CAPSULE | Refills: 0 | Status: SHIPPED | OUTPATIENT
Start: 2021-11-12 | End: 2022-02-09

## 2021-11-12 RX ORDER — VALSARTAN AND HYDROCHLOROTHIAZIDE 160; 12.5 MG/1; MG/1
1 TABLET, FILM COATED ORAL DAILY
Qty: 90 TABLET | Refills: 1 | Status: SHIPPED | OUTPATIENT
Start: 2021-11-12 | End: 2022-05-10

## 2021-11-12 SDOH — ECONOMIC STABILITY: FOOD INSECURITY: WITHIN THE PAST 12 MONTHS, YOU WORRIED THAT YOUR FOOD WOULD RUN OUT BEFORE YOU GOT MONEY TO BUY MORE.: NEVER TRUE

## 2021-11-12 SDOH — ECONOMIC STABILITY: FOOD INSECURITY: WITHIN THE PAST 12 MONTHS, THE FOOD YOU BOUGHT JUST DIDN'T LAST AND YOU DIDN'T HAVE MONEY TO GET MORE.: NEVER TRUE

## 2021-11-12 ASSESSMENT — SOCIAL DETERMINANTS OF HEALTH (SDOH): HOW HARD IS IT FOR YOU TO PAY FOR THE VERY BASICS LIKE FOOD, HOUSING, MEDICAL CARE, AND HEATING?: NOT HARD AT ALL

## 2021-11-12 ASSESSMENT — PATIENT HEALTH QUESTIONNAIRE - PHQ9
SUM OF ALL RESPONSES TO PHQ QUESTIONS 1-9: 0
SUM OF ALL RESPONSES TO PHQ QUESTIONS 1-9: 0
2. FEELING DOWN, DEPRESSED OR HOPELESS: 0
SUM OF ALL RESPONSES TO PHQ QUESTIONS 1-9: 0
SUM OF ALL RESPONSES TO PHQ9 QUESTIONS 1 & 2: 0
1. LITTLE INTEREST OR PLEASURE IN DOING THINGS: 0

## 2021-11-12 NOTE — PATIENT INSTRUCTIONS

## 2021-11-12 NOTE — PROGRESS NOTES
Breath sounds: Normal breath sounds. Musculoskeletal:      Right lower leg: No edema. Left lower leg: No edema. Skin:     General: Skin is warm and dry. Neurological:      Mental Status: He is alert. Psychiatric:         Mood and Affect: Mood normal.         Behavior: Behavior normal.         Thought Content: Thought content normal.         Judgment: Judgment normal.                  An electronic signature was used to authenticate this note.     --Carmelina Salazar MD

## 2021-11-23 PROBLEM — I10 PRIMARY HYPERTENSION: Status: ACTIVE | Noted: 2021-11-23

## 2021-11-23 PROBLEM — R05.9 COUGH: Status: ACTIVE | Noted: 2021-11-23

## 2021-11-23 PROBLEM — E11.9 TYPE 2 DIABETES MELLITUS WITHOUT COMPLICATION, WITHOUT LONG-TERM CURRENT USE OF INSULIN (HCC): Status: ACTIVE | Noted: 2021-11-23

## 2021-12-23 PROBLEM — R05.9 COUGH: Status: RESOLVED | Noted: 2021-11-23 | Resolved: 2021-12-23

## 2022-01-20 ENCOUNTER — HOSPITAL ENCOUNTER (INPATIENT)
Age: 67
LOS: 1 days | Discharge: HOME HEALTH | DRG: 177 | End: 2022-01-22
Attending: EMERGENCY MEDICINE | Admitting: HOSPITALIST
Payer: MEDICARE

## 2022-01-20 ENCOUNTER — APPOINTMENT (OUTPATIENT)
Dept: GENERAL RADIOLOGY | Age: 67
DRG: 177 | End: 2022-01-20
Payer: MEDICARE

## 2022-01-20 DIAGNOSIS — U07.1 COVID-19: Primary | ICD-10-CM

## 2022-01-20 DIAGNOSIS — N17.9 ACUTE KIDNEY INJURY (HCC): ICD-10-CM

## 2022-01-20 LAB
ANION GAP SERPL CALCULATED.3IONS-SCNC: 13 MMOL/L (ref 3–16)
BASE EXCESS VENOUS: 7.7 MMOL/L (ref -2–3)
BASOPHILS ABSOLUTE: 0 K/UL (ref 0–0.2)
BASOPHILS RELATIVE PERCENT: 0.3 %
BUN BLDV-MCNC: 44 MG/DL (ref 7–20)
CALCIUM SERPL-MCNC: 8.2 MG/DL (ref 8.3–10.6)
CARBOXYHEMOGLOBIN: 0.8 % (ref 0–1.5)
CHLORIDE BLD-SCNC: 93 MMOL/L (ref 99–110)
CO2: 29 MMOL/L (ref 21–32)
CREAT SERPL-MCNC: 1.5 MG/DL (ref 0.8–1.3)
EKG ATRIAL RATE: 78 BPM
EKG DIAGNOSIS: NORMAL
EKG P AXIS: 13 DEGREES
EKG P-R INTERVAL: 182 MS
EKG Q-T INTERVAL: 404 MS
EKG QRS DURATION: 74 MS
EKG QTC CALCULATION (BAZETT): 460 MS
EKG R AXIS: -4 DEGREES
EKG T AXIS: -4 DEGREES
EKG VENTRICULAR RATE: 78 BPM
EOSINOPHILS ABSOLUTE: 0 K/UL (ref 0–0.6)
EOSINOPHILS RELATIVE PERCENT: 0 %
GFR AFRICAN AMERICAN: 56
GFR NON-AFRICAN AMERICAN: 46
GLUCOSE BLD-MCNC: 132 MG/DL (ref 70–99)
HCO3 VENOUS: 35.7 MMOL/L (ref 24–28)
HCT VFR BLD CALC: 47.8 % (ref 40.5–52.5)
HEMOGLOBIN, VEN, REDUCED: 73.1 %
HEMOGLOBIN: 16.7 G/DL (ref 13.5–17.5)
LACTIC ACID: 1.1 MMOL/L (ref 0.4–2)
LYMPHOCYTES ABSOLUTE: 1.1 K/UL (ref 1–5.1)
LYMPHOCYTES RELATIVE PERCENT: 24.9 %
MAGNESIUM: 2.1 MG/DL (ref 1.8–2.4)
MCH RBC QN AUTO: 31.2 PG (ref 26–34)
MCHC RBC AUTO-ENTMCNC: 34.9 G/DL (ref 31–36)
MCV RBC AUTO: 89.4 FL (ref 80–100)
METHEMOGLOBIN VENOUS: 0.3 % (ref 0–1.5)
MONOCYTES ABSOLUTE: 0.5 K/UL (ref 0–1.3)
MONOCYTES RELATIVE PERCENT: 11.8 %
NEUTROPHILS ABSOLUTE: 2.9 K/UL (ref 1.7–7.7)
NEUTROPHILS RELATIVE PERCENT: 63 %
O2 SAT, VEN: 26 %
PCO2, VEN: 60.7 MMHG (ref 41–51)
PDW BLD-RTO: 13.5 % (ref 12.4–15.4)
PH VENOUS: 7.38 (ref 7.35–7.45)
PLATELET # BLD: 155 K/UL (ref 135–450)
PMV BLD AUTO: 8.1 FL (ref 5–10.5)
PO2, VEN: <30 MMHG (ref 25–40)
POTASSIUM REFLEX MAGNESIUM: 3.2 MMOL/L (ref 3.5–5.1)
PRO-BNP: 74 PG/ML (ref 0–124)
RBC # BLD: 5.34 M/UL (ref 4.2–5.9)
SARS-COV-2, NAAT: DETECTED
SODIUM BLD-SCNC: 135 MMOL/L (ref 136–145)
TCO2 CALC VENOUS: 38 MMOL/L
TROPONIN: <0.01 NG/ML
WBC # BLD: 4.6 K/UL (ref 4–11)

## 2022-01-20 PROCEDURE — 82803 BLOOD GASES ANY COMBINATION: CPT

## 2022-01-20 PROCEDURE — 80048 BASIC METABOLIC PNL TOTAL CA: CPT

## 2022-01-20 PROCEDURE — 83605 ASSAY OF LACTIC ACID: CPT

## 2022-01-20 PROCEDURE — 93005 ELECTROCARDIOGRAM TRACING: CPT | Performed by: EMERGENCY MEDICINE

## 2022-01-20 PROCEDURE — 71045 X-RAY EXAM CHEST 1 VIEW: CPT

## 2022-01-20 PROCEDURE — 85025 COMPLETE CBC W/AUTO DIFF WBC: CPT

## 2022-01-20 PROCEDURE — 99284 EMERGENCY DEPT VISIT MOD MDM: CPT

## 2022-01-20 PROCEDURE — 83880 ASSAY OF NATRIURETIC PEPTIDE: CPT

## 2022-01-20 PROCEDURE — 83735 ASSAY OF MAGNESIUM: CPT

## 2022-01-20 PROCEDURE — 87635 SARS-COV-2 COVID-19 AMP PRB: CPT

## 2022-01-20 PROCEDURE — 84145 PROCALCITONIN (PCT): CPT

## 2022-01-20 PROCEDURE — 6360000002 HC RX W HCPCS: Performed by: STUDENT IN AN ORGANIZED HEALTH CARE EDUCATION/TRAINING PROGRAM

## 2022-01-20 PROCEDURE — 2580000003 HC RX 258: Performed by: STUDENT IN AN ORGANIZED HEALTH CARE EDUCATION/TRAINING PROGRAM

## 2022-01-20 PROCEDURE — U0005 INFEC AGEN DETEC AMPLI PROBE: HCPCS

## 2022-01-20 PROCEDURE — U0003 INFECTIOUS AGENT DETECTION BY NUCLEIC ACID (DNA OR RNA); SEVERE ACUTE RESPIRATORY SYNDROME CORONAVIRUS 2 (SARS-COV-2) (CORONAVIRUS DISEASE [COVID-19]), AMPLIFIED PROBE TECHNIQUE, MAKING USE OF HIGH THROUGHPUT TECHNOLOGIES AS DESCRIBED BY CMS-2020-01-R: HCPCS

## 2022-01-20 PROCEDURE — 36415 COLL VENOUS BLD VENIPUNCTURE: CPT

## 2022-01-20 PROCEDURE — 84484 ASSAY OF TROPONIN QUANT: CPT

## 2022-01-20 RX ORDER — DEXAMETHASONE 4 MG/1
6 TABLET ORAL ONCE
Status: COMPLETED | OUTPATIENT
Start: 2022-01-20 | End: 2022-01-20

## 2022-01-20 RX ORDER — SODIUM CHLORIDE, SODIUM LACTATE, POTASSIUM CHLORIDE, AND CALCIUM CHLORIDE .6; .31; .03; .02 G/100ML; G/100ML; G/100ML; G/100ML
500 INJECTION, SOLUTION INTRAVENOUS ONCE
Status: COMPLETED | OUTPATIENT
Start: 2022-01-20 | End: 2022-01-21

## 2022-01-20 RX ADMIN — SODIUM CHLORIDE, SODIUM LACTATE, POTASSIUM CHLORIDE, AND CALCIUM CHLORIDE 500 ML: .6; .31; .03; .02 INJECTION, SOLUTION INTRAVENOUS at 23:38

## 2022-01-20 RX ADMIN — DEXAMETHASONE 6 MG: 4 TABLET ORAL at 23:39

## 2022-01-20 ASSESSMENT — PAIN SCALES - GENERAL: PAINLEVEL_OUTOF10: 0

## 2022-01-21 PROBLEM — U07.1 COVID-19: Status: ACTIVE | Noted: 2022-01-21

## 2022-01-21 PROBLEM — J12.82 PNEUMONIA DUE TO COVID-19 VIRUS: Status: ACTIVE | Noted: 2022-01-21

## 2022-01-21 LAB
ANION GAP SERPL CALCULATED.3IONS-SCNC: 17 MMOL/L (ref 3–16)
BASOPHILS ABSOLUTE: 0 K/UL (ref 0–0.2)
BASOPHILS RELATIVE PERCENT: 0.4 %
BUN BLDV-MCNC: 44 MG/DL (ref 7–20)
C-REACTIVE PROTEIN: 30.7 MG/L (ref 0–5.1)
CALCIUM SERPL-MCNC: 8.9 MG/DL (ref 8.3–10.6)
CHLORIDE BLD-SCNC: 94 MMOL/L (ref 99–110)
CO2: 27 MMOL/L (ref 21–32)
CREAT SERPL-MCNC: 1.4 MG/DL (ref 0.8–1.3)
D DIMER: 214 NG/ML DDU (ref 0–229)
EOSINOPHILS ABSOLUTE: 0 K/UL (ref 0–0.6)
EOSINOPHILS RELATIVE PERCENT: 0 %
FERRITIN: 1440 NG/ML (ref 30–400)
GFR AFRICAN AMERICAN: >60
GFR NON-AFRICAN AMERICAN: 50
GLUCOSE BLD-MCNC: 125 MG/DL (ref 70–99)
GLUCOSE BLD-MCNC: 142 MG/DL (ref 70–99)
GLUCOSE BLD-MCNC: 156 MG/DL (ref 70–99)
GLUCOSE BLD-MCNC: 157 MG/DL (ref 70–99)
GLUCOSE BLD-MCNC: 178 MG/DL (ref 70–99)
HCT VFR BLD CALC: 45.9 % (ref 40.5–52.5)
HEMOGLOBIN: 16 G/DL (ref 13.5–17.5)
LYMPHOCYTES ABSOLUTE: 0.5 K/UL (ref 1–5.1)
LYMPHOCYTES RELATIVE PERCENT: 14.2 %
MCH RBC QN AUTO: 31.1 PG (ref 26–34)
MCHC RBC AUTO-ENTMCNC: 34.8 G/DL (ref 31–36)
MCV RBC AUTO: 89.5 FL (ref 80–100)
MONOCYTES ABSOLUTE: 0.2 K/UL (ref 0–1.3)
MONOCYTES RELATIVE PERCENT: 6.3 %
NEUTROPHILS ABSOLUTE: 2.8 K/UL (ref 1.7–7.7)
NEUTROPHILS RELATIVE PERCENT: 79.1 %
PDW BLD-RTO: 13.4 % (ref 12.4–15.4)
PERFORMED ON: ABNORMAL
PLATELET # BLD: 134 K/UL (ref 135–450)
PMV BLD AUTO: 8 FL (ref 5–10.5)
POTASSIUM SERPL-SCNC: 3.4 MMOL/L (ref 3.5–5.1)
PROCALCITONIN: 0.26 NG/ML (ref 0–0.15)
RAPID INFLUENZA  B AGN: NEGATIVE
RAPID INFLUENZA A AGN: NEGATIVE
RBC # BLD: 5.12 M/UL (ref 4.2–5.9)
SARS-COV-2: DETECTED
SODIUM BLD-SCNC: 138 MMOL/L (ref 136–145)
WBC # BLD: 3.5 K/UL (ref 4–11)

## 2022-01-21 PROCEDURE — 6360000002 HC RX W HCPCS: Performed by: STUDENT IN AN ORGANIZED HEALTH CARE EDUCATION/TRAINING PROGRAM

## 2022-01-21 PROCEDURE — 2580000003 HC RX 258: Performed by: STUDENT IN AN ORGANIZED HEALTH CARE EDUCATION/TRAINING PROGRAM

## 2022-01-21 PROCEDURE — 99222 1ST HOSP IP/OBS MODERATE 55: CPT | Performed by: HOSPITALIST

## 2022-01-21 PROCEDURE — 87449 NOS EACH ORGANISM AG IA: CPT

## 2022-01-21 PROCEDURE — 94761 N-INVAS EAR/PLS OXIMETRY MLT: CPT

## 2022-01-21 PROCEDURE — 85025 COMPLETE CBC W/AUTO DIFF WBC: CPT

## 2022-01-21 PROCEDURE — 86140 C-REACTIVE PROTEIN: CPT

## 2022-01-21 PROCEDURE — 85379 FIBRIN DEGRADATION QUANT: CPT

## 2022-01-21 PROCEDURE — 2060000000 HC ICU INTERMEDIATE R&B

## 2022-01-21 PROCEDURE — 82728 ASSAY OF FERRITIN: CPT

## 2022-01-21 PROCEDURE — 87631 RESP VIRUS 3-5 TARGETS: CPT

## 2022-01-21 PROCEDURE — 80048 BASIC METABOLIC PNL TOTAL CA: CPT

## 2022-01-21 PROCEDURE — 6370000000 HC RX 637 (ALT 250 FOR IP): Performed by: STUDENT IN AN ORGANIZED HEALTH CARE EDUCATION/TRAINING PROGRAM

## 2022-01-21 PROCEDURE — 2500000003 HC RX 250 WO HCPCS: Performed by: STUDENT IN AN ORGANIZED HEALTH CARE EDUCATION/TRAINING PROGRAM

## 2022-01-21 PROCEDURE — 2700000000 HC OXYGEN THERAPY PER DAY

## 2022-01-21 PROCEDURE — 36415 COLL VENOUS BLD VENIPUNCTURE: CPT

## 2022-01-21 PROCEDURE — 87804 INFLUENZA ASSAY W/OPTIC: CPT

## 2022-01-21 RX ORDER — ACETAMINOPHEN 650 MG/1
650 SUPPOSITORY RECTAL EVERY 6 HOURS PRN
Status: DISCONTINUED | OUTPATIENT
Start: 2022-01-21 | End: 2022-01-22 | Stop reason: HOSPADM

## 2022-01-21 RX ORDER — POLYETHYLENE GLYCOL 3350 17 G/17G
17 POWDER, FOR SOLUTION ORAL DAILY PRN
Status: DISCONTINUED | OUTPATIENT
Start: 2022-01-21 | End: 2022-01-22 | Stop reason: HOSPADM

## 2022-01-21 RX ORDER — INSULIN LISPRO 100 [IU]/ML
0-6 INJECTION, SOLUTION INTRAVENOUS; SUBCUTANEOUS
Status: DISCONTINUED | OUTPATIENT
Start: 2022-01-21 | End: 2022-01-22 | Stop reason: HOSPADM

## 2022-01-21 RX ORDER — DEXTROSE MONOHYDRATE 100 MG/ML
12.5 INJECTION, SOLUTION INTRAVENOUS PRN
Status: DISCONTINUED | OUTPATIENT
Start: 2022-01-21 | End: 2022-01-22 | Stop reason: HOSPADM

## 2022-01-21 RX ORDER — PRAVASTATIN SODIUM 20 MG
20 TABLET ORAL NIGHTLY
Status: DISCONTINUED | OUTPATIENT
Start: 2022-01-21 | End: 2022-01-22 | Stop reason: HOSPADM

## 2022-01-21 RX ORDER — INSULIN LISPRO 100 [IU]/ML
0-3 INJECTION, SOLUTION INTRAVENOUS; SUBCUTANEOUS NIGHTLY
Status: DISCONTINUED | OUTPATIENT
Start: 2022-01-21 | End: 2022-01-22 | Stop reason: HOSPADM

## 2022-01-21 RX ORDER — NICOTINE POLACRILEX 4 MG
15 LOZENGE BUCCAL PRN
Status: DISCONTINUED | OUTPATIENT
Start: 2022-01-21 | End: 2022-01-22 | Stop reason: HOSPADM

## 2022-01-21 RX ORDER — SODIUM CHLORIDE 0.9 % (FLUSH) 0.9 %
5-40 SYRINGE (ML) INJECTION PRN
Status: DISCONTINUED | OUTPATIENT
Start: 2022-01-21 | End: 2022-01-22 | Stop reason: HOSPADM

## 2022-01-21 RX ORDER — 0.9 % SODIUM CHLORIDE 0.9 %
500 INTRAVENOUS SOLUTION INTRAVENOUS ONCE
Status: COMPLETED | OUTPATIENT
Start: 2022-01-21 | End: 2022-01-21

## 2022-01-21 RX ORDER — SODIUM CHLORIDE 9 MG/ML
INJECTION, SOLUTION INTRAVENOUS CONTINUOUS
Status: ACTIVE | OUTPATIENT
Start: 2022-01-21 | End: 2022-01-21

## 2022-01-21 RX ORDER — SODIUM CHLORIDE 0.9 % (FLUSH) 0.9 %
5-40 SYRINGE (ML) INJECTION EVERY 12 HOURS SCHEDULED
Status: DISCONTINUED | OUTPATIENT
Start: 2022-01-21 | End: 2022-01-22 | Stop reason: HOSPADM

## 2022-01-21 RX ORDER — ONDANSETRON 4 MG/1
4 TABLET, ORALLY DISINTEGRATING ORAL EVERY 8 HOURS PRN
Status: DISCONTINUED | OUTPATIENT
Start: 2022-01-21 | End: 2022-01-22 | Stop reason: HOSPADM

## 2022-01-21 RX ORDER — DEXAMETHASONE 4 MG/1
6 TABLET ORAL DAILY
Status: DISCONTINUED | OUTPATIENT
Start: 2022-01-21 | End: 2022-01-22 | Stop reason: HOSPADM

## 2022-01-21 RX ORDER — DEXTROSE MONOHYDRATE 50 MG/ML
100 INJECTION, SOLUTION INTRAVENOUS PRN
Status: DISCONTINUED | OUTPATIENT
Start: 2022-01-21 | End: 2022-01-22 | Stop reason: HOSPADM

## 2022-01-21 RX ORDER — SODIUM CHLORIDE 9 MG/ML
25 INJECTION, SOLUTION INTRAVENOUS PRN
Status: DISCONTINUED | OUTPATIENT
Start: 2022-01-21 | End: 2022-01-22 | Stop reason: HOSPADM

## 2022-01-21 RX ORDER — ONDANSETRON 2 MG/ML
4 INJECTION INTRAMUSCULAR; INTRAVENOUS EVERY 6 HOURS PRN
Status: DISCONTINUED | OUTPATIENT
Start: 2022-01-21 | End: 2022-01-22 | Stop reason: HOSPADM

## 2022-01-21 RX ORDER — SODIUM CHLORIDE 9 MG/ML
INJECTION, SOLUTION INTRAVENOUS CONTINUOUS
Status: DISCONTINUED | OUTPATIENT
Start: 2022-01-21 | End: 2022-01-21

## 2022-01-21 RX ORDER — POTASSIUM CHLORIDE 20 MEQ/1
40 TABLET, EXTENDED RELEASE ORAL ONCE
Status: COMPLETED | OUTPATIENT
Start: 2022-01-21 | End: 2022-01-21

## 2022-01-21 RX ORDER — HEPARIN SODIUM 5000 [USP'U]/ML
5000 INJECTION, SOLUTION INTRAVENOUS; SUBCUTANEOUS EVERY 8 HOURS SCHEDULED
Status: DISCONTINUED | OUTPATIENT
Start: 2022-01-21 | End: 2022-01-22 | Stop reason: HOSPADM

## 2022-01-21 RX ORDER — ACETAMINOPHEN 325 MG/1
650 TABLET ORAL EVERY 6 HOURS PRN
Status: DISCONTINUED | OUTPATIENT
Start: 2022-01-21 | End: 2022-01-22 | Stop reason: HOSPADM

## 2022-01-21 RX ADMIN — SODIUM CHLORIDE, PRESERVATIVE FREE 10 ML: 5 INJECTION INTRAVENOUS at 21:23

## 2022-01-21 RX ADMIN — SODIUM CHLORIDE, PRESERVATIVE FREE 10 ML: 5 INJECTION INTRAVENOUS at 12:08

## 2022-01-21 RX ADMIN — SODIUM CHLORIDE: 9 INJECTION, SOLUTION INTRAVENOUS at 13:36

## 2022-01-21 RX ADMIN — FAMOTIDINE 20 MG: 10 INJECTION, SOLUTION INTRAVENOUS at 21:21

## 2022-01-21 RX ADMIN — HEPARIN SODIUM 5000 UNITS: 5000 INJECTION INTRAVENOUS; SUBCUTANEOUS at 13:35

## 2022-01-21 RX ADMIN — SODIUM CHLORIDE 500 ML: 9 INJECTION, SOLUTION INTRAVENOUS at 02:59

## 2022-01-21 RX ADMIN — INSULIN LISPRO 1 UNITS: 100 INJECTION, SOLUTION INTRAVENOUS; SUBCUTANEOUS at 13:21

## 2022-01-21 RX ADMIN — INSULIN LISPRO 1 UNITS: 100 INJECTION, SOLUTION INTRAVENOUS; SUBCUTANEOUS at 22:38

## 2022-01-21 RX ADMIN — HEPARIN SODIUM 5000 UNITS: 5000 INJECTION INTRAVENOUS; SUBCUTANEOUS at 05:34

## 2022-01-21 RX ADMIN — POTASSIUM BICARBONATE 20 MEQ: 782 TABLET, EFFERVESCENT ORAL at 13:36

## 2022-01-21 RX ADMIN — HEPARIN SODIUM 5000 UNITS: 5000 INJECTION INTRAVENOUS; SUBCUTANEOUS at 21:20

## 2022-01-21 RX ADMIN — FAMOTIDINE 20 MG: 10 INJECTION, SOLUTION INTRAVENOUS at 09:50

## 2022-01-21 RX ADMIN — SODIUM CHLORIDE: 9 INJECTION, SOLUTION INTRAVENOUS at 05:37

## 2022-01-21 RX ADMIN — POTASSIUM CHLORIDE 40 MEQ: 1500 TABLET, EXTENDED RELEASE ORAL at 05:34

## 2022-01-21 RX ADMIN — PRAVASTATIN SODIUM 20 MG: 20 TABLET ORAL at 21:21

## 2022-01-21 RX ADMIN — DEXAMETHASONE 6 MG: 4 TABLET ORAL at 13:35

## 2022-01-21 ASSESSMENT — PAIN SCALES - GENERAL
PAINLEVEL_OUTOF10: 0

## 2022-01-21 ASSESSMENT — ENCOUNTER SYMPTOMS
NAUSEA: 1
ABDOMINAL DISTENTION: 0
SHORTNESS OF BREATH: 0
DIARRHEA: 1
BLOOD IN STOOL: 0
COUGH: 0
VOMITING: 0
CONSTIPATION: 0
STRIDOR: 0
CHEST TIGHTNESS: 0
NAUSEA: 0
EYES NEGATIVE: 1
ANAL BLEEDING: 0
ABDOMINAL PAIN: 0
SORE THROAT: 0
COLOR CHANGE: 0
TROUBLE SWALLOWING: 0
SHORTNESS OF BREATH: 1
COUGH: 1
RECTAL PAIN: 0
CHOKING: 0
RHINORRHEA: 0
WHEEZING: 0

## 2022-01-21 NOTE — PROGRESS NOTES
PT admitted to room 4323. Oriented to room, unit schedule and plan of care. Assessment, vitals, admission and 4-eye skin assessment completed. 4 Eyes Admission Assessment     I agree as the admission nurse that 2 RN's have performed a thorough Head to Toe Skin Assessment on the patient. ALL assessment sites listed below have been assessed on admission. Areas assessed by both nurses:   [x]   Head, Face, and Ears   [x]   Shoulders, Back, and Chest  [x]   Arms, Elbows, and Hands   [x]   Coccyx, Sacrum, and Ischium  [x]   Legs, Feet, and Heels        Does the Patient have Skin Breakdown?   No         Henry Prevention initiated:  No   Wound Care Orders initiated:  No      [facility] nurse consulted for Pressure Injury (Stage 3,4, Unstageable, DTI, NWPT, and Complex wounds) or Henry score 18 or lower:  No      Nurse 1 eSignature: Electronically signed by Mercedes Tripathi RN on 1/21/22 at 4:48 AM EST    **SHARE this note so that the co-signing nurse is able to place an eSignature**    Nurse 2 eSignature: Electronically signed by Ellen Bone RN on 1/21/22 at 3:04 AM EST

## 2022-01-21 NOTE — PROGRESS NOTES
Pt weaned to room air, spO2 >90% at this time. Denies sob or dyspnea on exertion. Continues to work with IS frequently throughout the day.

## 2022-01-21 NOTE — PROGRESS NOTES
Attempted to reach spouse Manjinder Alfonso. Sent directly to voicemail and unable to leave message. Will leave information for dayshift.      Electronically signed by Krysten Griffith RN on 1/21/2022 at 6:52 AM

## 2022-01-21 NOTE — PROGRESS NOTES
Attempted to call patients wife, Gary Smiling to update on patient status and plan of care. Voicemail left. Was able to answer some questions earlier this morning when she was on the phone with patient in room.

## 2022-01-21 NOTE — CARE COORDINATION
patients co-pays  5. Patients can then  the prescription on their way out of the hospital at discharge, or pharmacy can deliver to the bedside if staff is available. (payment due at time of pick-up or delivery - cash, check, or card accepted)     Able to afford home medications/ co-pay costs: Yes    ADLS  Support Systems:      PT AM-PAC:   /24  OT AM-PAC:   /24    [place]: from home with spouse  Steps: yes    Plans to RETURN to current housing: Yes  Barriers to RETURNING to current housing: none    Home Care Information  Currently ACTIVE with [address]: No  Home Care Agency: Not Applicable          Durable Medical Equipment  DME Provider: n/a  Equipment: n/a          DISCHARGE PLAN:  Disposition: Home- No Services Needed    Transportation PLAN for discharge: family     Factors facilitating achievement of predicted outcomes: Family support    Barriers to discharge: Medical complications    Additional Case Management Notes:   Patient is from home with family independent pta, no DME needs, drives self. Patient does not know if [address] would be needed. Family to transport home. The Plan for Transition of Care is related to the following treatment goals of Acute kidney injury ([facility] [address] [N17.9]  COVID-19 [U07.1]    The Patient and/or patient representative Branden Frederick and his family were provided with a choice of provider and agrees with the discharge plan Yes    Freedom of choice list was provided with basic dialogue that supports the patient's individualized plan of care/goals and shares the quality data associated with the providers.  Yes    Care Transition patient: No    Cammie Hamm RN  [facility] [employer]  Case Management Department  Ph: [phone]   Fax: [phone]

## 2022-01-21 NOTE — ED PROVIDER NOTES
[address] [facility] [address] ENCOUNTER          ATTENDING PHYSICIAN NOTE       Date of evaluation: 1/20/2022    ADDENDUM:      Care of this patient was assumed from Dr Brinda Wiggins. The patient was seen for Concern For COVID-19 (tested for COVID today, diarrhea, fever, low 02 sat at home )  . The patient's initial evaluation and plan have been discussed with the prior provider who initially evaluated the patient. Nursing Notes, Past Medical Hx, Past Surgical Hx, Social Hx, Allergies, and Family Hx were all reviewed. Diagnostic Results     EKG   See prior providers' note    RADIOLOGY:  XR CHEST PORTABLE   Final Result      Mild patchy bilateral airspace disease greatest in the left lower lobe.              LABS:   Results for orders placed or performed during the hospital encounter of 01/20/22   COVID-19, Rapid    Specimen: Nasopharyngeal Swab   Result Value Ref Range    SARS-CoV-2, NAAT DETECTED (AA) Not Detected   CBC Auto Differential   Result Value Ref Range    WBC 4.6 4.0 - 11.0 K/uL    RBC 5.34 4.20 - 5.90 M/uL    Hemoglobin 16.7 13.5 - 17.5 g/dL    Hematocrit 47.8 40.5 - 52.5 %    MCV 89.4 80.0 - 100.0 fL    MCH 31.2 26.0 - 34.0 pg    MCHC 34.9 31.0 - 36.0 g/dL    RDW 13.5 12.4 - 15.4 %    Platelets 531 235 - 868 K/uL    MPV 8.1 5.0 - 10.5 fL    Neutrophils % 63.0 %    Lymphocytes % 24.9 %    Monocytes % 11.8 %    Eosinophils % 0.0 %    Basophils % 0.3 %    Neutrophils Absolute 2.9 1.7 - 7.7 K/uL    Lymphocytes Absolute 1.1 1.0 - 5.1 K/uL    Monocytes Absolute 0.5 0.0 - 1.3 K/uL    Eosinophils Absolute 0.0 0.0 - 0.6 K/uL    Basophils Absolute 0.0 0.0 - 0.2 K/uL   Basic Metabolic Panel w/ Reflex to MG   Result Value Ref Range    Sodium 135 (L) 136 - 145 mmol/L    Potassium reflex Magnesium 3.2 (L) 3.5 - 5.1 mmol/L    Chloride 93 (L) 99 - 110 mmol/L    CO2 29 21 - 32 mmol/L    Anion Gap 13 3 - 16    Glucose 132 (H) 70 - 99 mg/dL    BUN 44 (H) 7 - 20 mg/dL    CREATININE 1.5 (H) 0.8 - 1.3 mg/dL    GFR Non- 46 (A) >60    GFR  56 (A) >60    Calcium 8.2 (L) 8.3 - 10.6 mg/dL   Blood Gas, Venous   Result Value Ref Range    pH, Jerardo 7.378 7.350 - 7.450    pCO2, Jerardo 60.7 (H) 41.0 - 51.0 mmHg    pO2, Jerardo <30.0 25.0 - 40.0 mmHg    HCO3, Venous 35.7 (H) 24.0 - 28.0 mmol/L    Base Excess, Jerardo 7.7 (H) -2.0 - 3.0 mmol/L    O2 Sat, Jerardo 26 Not established %    Carboxyhemoglobin 0.8 0.0 - 1.5 %    MetHgb, Jerardo 0.3 0.0 - 1.5 %    TC02 (Calc), Jerardo 38 mmol/L    Hemoglobin, Jerardo, Reduced 73.10 %   Lactic Acid, Plasma   Result Value Ref Range    Lactic Acid 1.1 0.4 - 2.0 mmol/L   Troponin   Result Value Ref Range    Troponin <0.01 <0.01 ng/mL   Brain Natriuretic Peptide   Result Value Ref Range    Pro-BNP 74 0 - 124 pg/mL   Magnesium   Result Value Ref Range    Magnesium 2.10 1.80 - 2.40 mg/dL       RECENT VITALS:  BP: 126/86, Temp: 97.9 °F (36.6 °C), Pulse: 85, Resp: 26, SpO2: 90 % (ambulating on RA)     Procedures     None    ED Course     The patient was given the following medications:  No orders of the defined types were placed in this encounter. CONSULTS:  IP CONSULT TO PRIMARY CARE PROVIDER    MEDICAL DECISION MAKING / ASSESSMENT / PLAN     Reyes Cheney is a 74 y.o. male with hx DM, HTN who presents with shortness of breath    He was hypoxic at home, hypoxic at triage, and hypoxic on re-challenge in the room (89% with good pleth after brief activity). He has been stable on 2L NC  COVID test is positive. ETHAN on (Cr 1.5 from baseline 1.0), likely due to hypovolemia given elevated BUN:Cr  Remaining labs reassuring:  CBC with no leukocytosis, making systemic infection somewhat less likely. There is no significant new anemia. Basic metabolic panel without evidence of   significant electrolyte derangement. Troponin not elevated making ACS less likely. BNP not elevated making acute exacerbation of heart failure less likely.   Mag normal.  VBG without acidemia, does show chronic CO2 retention. Lactate not elevated making systemic infection and hypoperfusion somewhat less likely. Decadron PO 6mg ordered. Plan for admission-discussed with Dr. Jacque Porter     1. COVID-19    2. Acute kidney injury ([facility] [address]        Disposition     PATIENT REFERRED TO:  No follow-up provider specified.     DISCHARGE MEDICATIONS:  New Prescriptions    No medications on file       DISPOSITION Decision To Admit 01/20/2022 11:06:03 PM         Dunia Sanderson MD  01/20/22 9349

## 2022-01-21 NOTE — H&P
mouth      metFORMIN (GLUCOPHAGE) 500 MG tablet Take 500 mg by mouth       Allergies:  Patient has no known allergies. Social History:   · TOBACCO:   reports that he has never smoked. He has never used smokeless tobacco.  · ETOH:   reports current alcohol use. · DRUGS : no reported use  · Patient currently lives at home  ·   Family History:       Problem Relation Age of Onset    Diabetes Mother     No Known Problems Father    ·     Review of Systems   Constitutional: Positive for fatigue and fever. Negative for chills and diaphoresis. HENT: Negative for congestion, rhinorrhea, sneezing, sore throat and trouble swallowing. Eyes: Negative for visual disturbance. Respiratory: Positive for cough. Negative for choking, chest tightness, shortness of breath, wheezing and stridor. Cardiovascular: Negative for chest pain and leg swelling. Gastrointestinal: Positive for diarrhea and nausea. Negative for abdominal distention, abdominal pain, anal bleeding, blood in stool, constipation, rectal pain and vomiting. Genitourinary: Negative for difficulty urinating and dysuria. Musculoskeletal: Negative for arthralgias and myalgias. Skin: Negative for color change, rash and wound. Neurological: Negative for dizziness, tremors, seizures, syncope, light-headedness, numbness and headaches. Psychiatric/Behavioral: Negative for agitation, behavioral problems and confusion. Physical Exam  Constitutional:       General: He is not in acute distress. Appearance: He is obese. HENT:      Head: Normocephalic. Right Ear: External ear normal.      Left Ear: External ear normal.      Nose: Nose normal.      Mouth/Throat:      Mouth: Mucous membranes are dry. Pharynx: Oropharynx is clear. Eyes:      Extraocular Movements: Extraocular movements intact. Conjunctiva/sclera: Conjunctivae normal.      Pupils: Pupils are equal, round, and reactive to light.    Cardiovascular:      Rate and Rhythm: Normal rate and regular rhythm. Pulses: Normal pulses. Heart sounds: Normal heart sounds. No murmur heard. No gallop. Pulmonary:      Effort: Pulmonary effort is normal. No respiratory distress. Comments: Mildly decreased breath sounds throughout greatest at the LLL with mild crackles. Slight rhonchi present on the R lung  Chest:      Chest wall: No tenderness. Abdominal:      General: Bowel sounds are normal. There is no distension. Palpations: Abdomen is soft. Tenderness: There is no abdominal tenderness. There is no guarding or rebound. Musculoskeletal:         General: No swelling or deformity. Right lower leg: No edema. Left lower leg: No edema. Skin:     General: Skin is warm. Coloration: Skin is not jaundiced. Findings: No bruising or lesion. Neurological:      Mental Status: He is alert and oriented to person, place, and time. Cranial Nerves: No cranial nerve deficit. Motor: No weakness. Gait: Gait normal.   Psychiatric:         Mood and Affect: Mood normal.         Behavior: Behavior normal.       Physical exam:       Vitals:    01/21/22 0000   BP: 110/76   Pulse: 71   Resp: 25   Temp:    SpO2: 95%       DATA:    Labs:  CBC:   Recent Labs     01/20/22 2129   WBC 4.6   HGB 16.7   HCT 47.8          BMP:   Recent Labs     01/20/22 2129   *   K 3.2*   CL 93*   CO2 29   BUN 44*   CREATININE 1.5*   GLUCOSE 132*     LFT's: No results for input(s): AST, ALT, ALB, BILITOT, ALKPHOS in the last 72 hours. Troponin:   Recent Labs     01/20/22 2129   TROPONINI <0.01         XR CHEST PORTABLE   Final Result      Mild patchy bilateral airspace disease greatest in the left lower lobe. ASSESSMENT AND PLAN:  Camelia Srinivasan is a 74year old Male with a PmHx of T2DM, HTN who presents with a CC of diarrhea and hypoxia at home with a positive COVID test.     COVID Pneumonia  Likely the cause of his diarrhea vs. Viral gastritis. Wife is also un-vaccinated and sick with similar symptoms including diarrhea. - Tested positive for COVID on day of admission (1/20)  - Requiring 2L NC  - Decadron 6 mg daily for ten days  - Ferritin, CRP, and D-Dimer pending  - Droplet plus isolation  - Zofran for nausea    Suspected overlying bacterial pneumonia  Focal left lung finding on chest XR, could be bacterial pneumonia with fevers and hypoxia. - Procal pending, if elevated may give antibiotics: mildly elevated, started on ceftriaxone and azithromycin  - Strep, legionella, flu pending  - Respiratory culture pending    ETHAN  Creatinine is 1.5 up from baseline 1.0 in 2020 . Likely pre-renal with poor PO intake and diarrhea  - urine studies pending  - IVF at 100 ml/hr after additional 500 cc NS bolus    T2DM  Last A1c was 6.5 in 4/2021  - LDSSI  - Hypoglycemia protocol and POC glucose checks     GERD  - Pepcid    HTN  - Holding home valsartan-HCTZ given pt is not hypertensive upon admission    HLD  - Continue home pravastatin    Will discuss with attending physician Dr. Pato Alcaraz Status:Full code  FEN: Regular diet  PPX: SubQ Heparin  DISPO: Tha Sen MD  1/21/2022,  12:48 AM    Addendum to Resident H& P/Progress note:  I have personally seen,examined and evaluated the patient.  I have reviewed the current history, physical findings, labs and assessment and plan and agree with note as documented by resident MD ( Karla Muñiz)      Destiny Calloway MD, [address]

## 2022-01-21 NOTE — PROGRESS NOTES
Progress Note    Admit Date: 1/20/2022  Day: 0  Diet: ADULT DIET; Regular; 3 carb choices (45 gm/meal)    CC: diarrhea, SOB, hypoxia    Interval history: [place]. Had episode of diarrhea this AM. Says it was yellow and liquid, nonbloody. Stable on 2L O2 NC. Denies CP, N/V. Asking for breakfast, thinks he will be able to eat this morning. HPI: 74year old Male with a PmHx of T2DM, HTN who presents with a CC of diarrhea and hypoxia at home with a positive COVID test. For the last 2-3 days he reports intermittent diarrhea and generalized fatigue and CARYN. He denies SOB but noticed he was hypoxic at home desatting to mid-80's. He denies malaise, myalgia, or loss of sense of smell or taste but has had a mild cough and generalized fatigue with diarrhea since the weekend. COVID + on test today, he is not vaccinated for COVID. His wife also reports diarrhea and denies SOB, neither are vaccinated. He reportedly desaturated in the ED with ambulation down to mid 80's, improved on 2L. He reports he has days without diarrhea but can have up to 4 BM in a day, reports the color is yellow without blood or black. He endorses fevers at home, up to 101 recorded, but comes down with Tylenol. Reports poor appetite, nausea and decreased PO intake with no known exposure to undercooked or improperly prepared food.     Medications:     Scheduled Meds:   insulin lispro  0-6 Units SubCUTAneous TID WC    insulin lispro  0-3 Units SubCUTAneous Nightly    famotidine (PEPCID) injection  20 mg IntraVENous BID    pravastatin  20 mg Oral Nightly    sodium chloride flush  5-40 mL IntraVENous 2 times per day    heparin (porcine)  5,000 Units SubCUTAneous 3 times per day    dexamethasone  6 mg Oral Daily    influenza virus vaccine  0.5 mL IntraMUSCular Prior to discharge    baricitinib  2 mg Oral Daily    potassium bicarb-citric acid  20 mEq Oral Once     Continuous Infusions:   dextrose      dextrose      sodium chloride       PRN Meds:glucose, dextrose, glucagon (rDNA), dextrose, sodium chloride flush, sodium chloride, ondansetron **OR** ondansetron, polyethylene glycol, acetaminophen **OR** acetaminophen    Objective:   Vitals:   T-max:  Patient Vitals for the past 8 hrs:   BP Temp Temp src Pulse Resp SpO2   01/21/22 0805 126/83 97.1 °F (36.2 °C) Oral 87 20 90 %   01/21/22 0458 -- -- -- -- -- 92 %   01/21/22 0414 129/79 100.2 °F (37.9 °C) Oral 80 16 92 %       Intake/Output Summary (Last 24 hours) at 1/21/2022 1037  Last data filed at 1/21/2022 0224  Gross per 24 hour   Intake 0 ml   Output 0 ml   Net 0 ml       Review of Systems   Constitutional: Negative for chills and fever. HENT: Negative for sore throat. Eyes: Negative. Respiratory: Positive for shortness of breath. Negative for cough and chest tightness. Cardiovascular: Negative for chest pain, palpitations and leg swelling. Gastrointestinal: Positive for diarrhea. Negative for abdominal pain, blood in stool, nausea and vomiting. Endocrine: Negative. Negative for polyuria. Genitourinary: Negative for dysuria, frequency and urgency. Skin: Negative. Hematological: Negative. Psychiatric/Behavioral: Negative. Physical Exam  Constitutional:       General: He is not in acute distress. Appearance: Normal appearance. HENT:      Head: Normocephalic and atraumatic. Right Ear: External ear normal.      Left Ear: External ear normal.      Nose: Nose normal.      Mouth/Throat:      Pharynx: Oropharynx is clear. Eyes:      Conjunctiva/sclera: Conjunctivae normal.   Cardiovascular:      Rate and Rhythm: Normal rate and regular rhythm. Pulses: Normal pulses. Heart sounds: No murmur heard. Pulmonary:      Effort: Pulmonary effort is normal. No respiratory distress. Comments: Stable and able to speak without SOB. On 2L NC.  Fine crackles at BL bases on exam.   Abdominal:      General: Bowel sounds are normal.      Palpations: Abdomen is soft.      Tenderness: There is no abdominal tenderness. Musculoskeletal:      Right lower leg: No edema. Left lower leg: No edema. Skin:     General: Skin is warm and dry. Neurological:      General: No focal deficit present. Mental Status: He is alert. LABS:    CBC:   Recent Labs     01/20/22 2129 01/21/22 0518   WBC 4.6 3.5*   HGB 16.7 16.0   HCT 47.8 45.9    134*   MCV 89.4 89.5     Renal:    Recent Labs     01/20/22 2129 01/21/22 0518   * 138   K 3.2* 3.4*   CL 93* 94*   CO2 29 27   BUN 44* 44*   CREATININE 1.5* 1.4*   GLUCOSE 132* 125*   CALCIUM 8.2* 8.9   MG 2.10  --    ANIONGAP 13 17*     Hepatic: No results for input(s): AST, ALT, BILITOT, BILIDIR, PROT, LABALBU, ALKPHOS in the last 72 hours. Troponin:   Recent Labs     01/20/22 2129   TROPONINI <0.01     -----------------------------------------------------------------  RAD:   XR CHEST PORTABLE   Final Result      Mild patchy bilateral airspace disease greatest in the left lower lobe. Assessment/Plan:       COVID Pneumonia - stable  Likely the cause of his diarrhea vs. Viral gastritis. Wife is also un-vaccinated and sick with similar symptoms including diarrhea. Tested positive for COVID on day of admission (1/20). - Requiring 2L NC - stable  - Decadron 6 mg daily for ten days  - Ferritin 1440; CRP 32, and D-Dimer 214  - Droplet plus isolation  - Zofran for nausea     ETHAN  Creatinine is 1.5 up from baseline 1.0 in 2020 .  Likely pre-renal with poor PO intake and diarrhea  - urine studies pending  - IVF at 100 ml/hr after additional 500 cc NS bolus     T2DM  Last A1c was 6.5 in 4/2021  - LDSSI  - Hypoglycemia protocol and POC glucose checks      GERD  - Pepcid     HTN  - Holding home valsartan-HCTZ given pt is not hypertensive upon admission     HLD  - Continue home pravastatin    Suspected overlying bacterial pneumonia - ruled out  Focal left lung finding on chest XR, could be bacterial pneumonia with fevers and hypoxia. - procal negative  - Strep, legionella, flu pending  - Respiratory culture pending        Will discuss with attending physician Dr. Alexa Villalta     Code Status:Full code  FEN: Regular diet  PPX: SubQ Heparin  DISPO: Yakelin Quach MD, PGY-1  01/21/22  10:37 AM    This patient has been staffed and discussed with Mai Lorenzo MD.     Addendum to Resident H& P/Progress note:  I have personally seen,examined and evaluated the patient.  I have reviewed the current history, physical findings, labs and assessment and plan and agree with note as documented by resident MD ( Cliff Myers)      Mai Lorenzo MD, Otto Dillard

## 2022-01-21 NOTE — CONSULTS
Clinical Pharmacy Consult  Note    Baricitinib - Management by Pharmacy    Consult Date(s): 1/21/22  Consulting Provider(s): Dr. Josiane Malone / Plan:    Nohemy Crow This patient meets criteria for initiation of baricitinib based on the following:  o Confirmed positive COVID-19  o Requiring supplemental oxygen  o On high flow nasal cannula, invasive or non-invasive ventilation, ECMO  o Elevated inflammatory markers (any elevation) in CRP, D-dimer, LDH or ferritin   Any special considerations to therapy (e.g., severe hepatic impairment, chronic immunosuppression, etc.) have been discussed with the prescriber as necessary.  Will order Baricitinib 2 mg daily.  CMP, CBC with diff will be monitored daily while patient is on baricitinib    Thank you for consulting Pharmacy! Ben Fuentes, PharmD, [place]  Wireless: [id number]   1/21/2022 10:29 AM          Subjective/Objective: Mr. Peyton Connors is a 74 y.o. male with a PMHx significant for DM2, HTN who presented 1/20 with diarrhea and hypoxia with positive COVID test prior to admission. Admitted with COVID pneumonia and suspected bacterial PNA. Pharmacy has been consulted to dose Baricitinib. Height:   Ht Readings from Last 1 Encounters:   01/21/22 5' 11\" (1.803 m)     Weight:   Wt Readings from Last 1 Encounters:   01/21/22 228 lb 9.9 oz (103.7 kg)       Estimated Creatinine Clearance: 62 mL/min (A) (based on SCr of 1.4 mg/dL (H)).     Recent Labs     01/20/22 2129 01/21/22  0518   CREATININE 1.5* 1.4*   BUN 44* 44*       Lab Results   Component Value Date    WBC 3.5 (L) 01/21/2022    HGB 16.0 01/21/2022    HCT 45.9 01/21/2022    MCV 89.5 01/21/2022     (L) 01/21/2022    LYMPHOPCT 14.2 01/21/2022    RBC 5.12 01/21/2022    MCH 31.1 01/21/2022    MCHC 34.8 01/21/2022    RDW 13.4 01/21/2022       No results found for: PREGTESTUR, PREGSERUM, HCG, HCGQUANT

## 2022-01-21 NOTE — PLAN OF CARE
Problem: Nutrition  Goal: Optimal nutrition therapy  Outcome: Ongoing     Nutrition Problem #1: Inadequate oral intake  Intervention: Food and/or Nutrient Delivery: Continue Current Diet,Start Oral Nutrition Supplement  Nutritional Goals: Pt to consistently consume >50% of meals and ONS through adm.

## 2022-01-21 NOTE — PROGRESS NOTES
Pt given IS to use and educated on use this am. Pt states he has been using hourly since this morning. Spo2 93% on 2 L so weaned to 1 L for 90% spO2. Encouraged patient to continue using IS and will continue to monitor to wean as possible.

## 2022-01-21 NOTE — PROGRESS NOTES
Comprehensive Nutrition Assessment    RECOMMENDATIONS:  1. PO Diet: Continue current diet  2. ONS: Start Glucerna BID    NUTRITION ASSESSMENT:   Type and Reason for Visit:  Type and Reason for Visit: Initial,Positive Nutrition Screen   Nutritional summary & status: +IP for poor appetite/intake PTA. Pt denies loss of taste/smell. EMR shows intakes at 0%. Pt reports eating jello and bland foods 1 week PTA. Pt states his appetite is improving and was able to eat >50% of his lunch today which consistent of mashed potatoes and fish. No c/o N/V/D/C. Pt agreeable to ONS to promote intake. Will order. Patient admitted d/t COVID-19+    PMH significant for: DM; HTN    MALNUTRITION ASSESSMENT  Context of Malnutrition: Acute Illness   Malnutrition Status: No malnutrition      NUTRITION DIAGNOSIS   Inadequate oral intake related to  (poor appetite) as evidenced by poor intake prior to admission    [address] and/or Nutrient Delivery:  Continue Current Diet,Start Oral Nutrition Supplement  Nutrition Education/Counseling:  No recommendation at this time   Goals:  Pt to consistently consume >50% of meals and ONS through adm. Nutrition Monitoring and Evaluation:   Food/Nutrient Intake Outcomes:  Food and Nutrient Intake,Supplement Intake  Physical Signs/Symptoms Outcomes:  Biochemical Data,Weight     OBJECTIVE DATA: Significant to nutrition assessment  · Nutrition-Focused Physical Findings: lbm PTA- loose  · Labs: Reviewed; ; K 3.4  · Meds: Reviewed; insulin; pepcid;   · Wounds: None       CURRENT NUTRITION THERAPIES  ADULT DIET;  Regular; 3 carb choices (45 gm/meal)     Additional Calorie Sources:   n/a  PO Intake: 0%   PO Supplement Intake:None Ordered  IVF: NS 50 ml/hr     ANTHROPOMETRICS  Current Height: 5' 11\" (180.3 cm)  Current Weight: 228 lb 9.9 oz (103.7 kg)    Admission weight: 235 lb (106.6 kg)  Ideal Body Weight (IBW): 172 lbs  (78 kg)    Usual Bodyweight   ~240 per EMR  Weight Changes Noted 5% wt loss over 3 months- insignificant       BMI: 32    Wt Readings from Last 50 Encounters:   01/21/22 228 lb 9.9 oz (103.7 kg)   11/12/21 240 lb (108.9 kg)   08/20/20 243 lb (110.2 kg)       COMPARATIVE STANDARDS  Energy (kcal):  6752-3774 (15-18); Weight Used for Energy Requirements:  Current (103kg)     Protein (g):  ; Weight Used for Protein Requirements:  Ideal        Fluid (ml/day):  >1500; Method Used for Fluid Requirements:  1 ml/kcal      The patient will still be monitored per nutrition standards of care. Consult dietitian if nutrition interventions essential to patient care is needed.      Magdalena De Leon [address] [place] [address]  Jesus:  [phone]  Office:  [phone]

## 2022-01-21 NOTE — PROGRESS NOTES
Clinical Pharmacy Progress Note    Baricitinib has been discontinued. Pharmacy will sign off. Please re-consult pharmacy if Baricitinib dosing is wanted in the future. Please call with questions.   Serena Maciel PharmD, BCPS  Wireless: [phone]   1/21/2022 11:10 AM

## 2022-01-21 NOTE — ED PROVIDER NOTES
[address]          ATTENDING PHYSICIAN NOTE       Date of evaluation: 1/20/2022    Chief Complaint     Concern For COVID-19 (tested for COVID today, diarrhea, fever, low 02 sat at home )      History of Present Illness     Lula Avitia is a 74 y.o. male who presents with complaints of shortness of breath and low pulse oximetry reading. He reports that it was in the upper 80s today when he checked it. He says he was not feeling particularly bad at the time, has had some cough, but also some just generalized fatigue for the last several days and diarrhea/GI symptoms. He has had symptoms for about 6 days or so. He had a COVID test performed today, but it has not resulted yet. Review of Systems     Review of Systems  Pertinent positives and negatives are listed in the HPI; otherwise all systems are reviewed and were negative. Past Medical, Surgical, Family, and Social History     He has a past medical history of Diabetes mellitus ([address] Hypertension, and Rash. He has no past surgical history on file. His family history includes Diabetes in his mother; No Known Problems in his father. He reports that he has never smoked. He has never used smokeless tobacco. He reports current alcohol use. He reports that he does not use drugs. Medications     Previous Medications    METFORMIN (GLUCOPHAGE) 500 MG TABLET    Take 500 mg by mouth    NAPROXEN (NAPROSYN) 500 MG TABLET    Take 1 tablet by mouth 2 times daily (with meals)    OMEPRAZOLE (PRILOSEC) 20 MG DELAYED RELEASE CAPSULE    Take 1 capsule by mouth every morning (before breakfast)    PRAVASTATIN (PRAVACHOL) 20 MG TABLET    Take 20 mg by mouth    VALSARTAN-HYDROCHLOROTHIAZIDE (DIOVAN-HCT) 160-12.5 MG PER TABLET    Take 1 tablet by mouth daily       Allergies     He has No Known Allergies.     Physical Exam     INITIAL VITALS: BP: 122/72, Temp: 97.9 °F (36.6 °C), Pulse: 78, Resp: 23, SpO2: (!) 89 %   Physical today, no diarrhea. He was noted to be hypoxic at home, and on 3 L here in the emergency department and maintains oxygen saturation between 90 to 94%, dropping with mild exertion here. Patient overall appears generally well, and says that he is not feeling short of breath. We will attempt an ambulatory pulse oximetry assessment, but his overall picture is one of COVID-pneumonia. If he is unable to ambulate without desaturation off oxygen, will require admission to the hospital.        Clinical Impression     1. COVID-19        Disposition     PATIENT REFERRED TO:  No follow-up provider specified.     DISCHARGE MEDICATIONS:  New Prescriptions    No medications on file       DISPOSITION           Luis Alfredo Spence MD  01/20/22 2920

## 2022-01-22 VITALS
HEIGHT: 71 IN | BODY MASS INDEX: 32.04 KG/M2 | WEIGHT: 228.84 LBS | OXYGEN SATURATION: 92 % | HEART RATE: 74 BPM | DIASTOLIC BLOOD PRESSURE: 72 MMHG | SYSTOLIC BLOOD PRESSURE: 120 MMHG | TEMPERATURE: 98.6 F | RESPIRATION RATE: 20 BRPM

## 2022-01-22 LAB
ALBUMIN SERPL-MCNC: 3.3 G/DL (ref 3.4–5)
ALP BLD-CCNC: 48 U/L (ref 40–129)
ALT SERPL-CCNC: 79 U/L (ref 10–40)
ANION GAP SERPL CALCULATED.3IONS-SCNC: 8 MMOL/L (ref 3–16)
AST SERPL-CCNC: 112 U/L (ref 15–37)
BILIRUB SERPL-MCNC: 0.5 MG/DL (ref 0–1)
BILIRUBIN DIRECT: <0.2 MG/DL (ref 0–0.3)
BILIRUBIN, INDIRECT: ABNORMAL MG/DL (ref 0–1)
BUN BLDV-MCNC: 34 MG/DL (ref 7–20)
CALCIUM SERPL-MCNC: 8.6 MG/DL (ref 8.3–10.6)
CHLORIDE BLD-SCNC: 99 MMOL/L (ref 99–110)
CO2: 31 MMOL/L (ref 21–32)
CREAT SERPL-MCNC: 0.9 MG/DL (ref 0.8–1.3)
GFR AFRICAN AMERICAN: >60
GFR NON-AFRICAN AMERICAN: >60
GLUCOSE BLD-MCNC: 132 MG/DL (ref 70–99)
GLUCOSE BLD-MCNC: 138 MG/DL (ref 70–99)
GLUCOSE BLD-MCNC: 146 MG/DL (ref 70–99)
L. PNEUMOPHILA SEROGP 1 UR AG: NORMAL
PERFORMED ON: ABNORMAL
PERFORMED ON: ABNORMAL
POTASSIUM SERPL-SCNC: 3.9 MMOL/L (ref 3.5–5.1)
SODIUM BLD-SCNC: 138 MMOL/L (ref 136–145)
STREP PNEUMONIAE ANTIGEN, URINE: NORMAL
TOTAL PROTEIN: 6.9 G/DL (ref 6.4–8.2)

## 2022-01-22 PROCEDURE — 99239 HOSP IP/OBS DSCHRG MGMT >30: CPT | Performed by: HOSPITALIST

## 2022-01-22 PROCEDURE — 2500000003 HC RX 250 WO HCPCS: Performed by: STUDENT IN AN ORGANIZED HEALTH CARE EDUCATION/TRAINING PROGRAM

## 2022-01-22 PROCEDURE — 80076 HEPATIC FUNCTION PANEL: CPT

## 2022-01-22 PROCEDURE — 94761 N-INVAS EAR/PLS OXIMETRY MLT: CPT

## 2022-01-22 PROCEDURE — 6360000002 HC RX W HCPCS: Performed by: STUDENT IN AN ORGANIZED HEALTH CARE EDUCATION/TRAINING PROGRAM

## 2022-01-22 PROCEDURE — 36415 COLL VENOUS BLD VENIPUNCTURE: CPT

## 2022-01-22 PROCEDURE — 80048 BASIC METABOLIC PNL TOTAL CA: CPT

## 2022-01-22 PROCEDURE — 2700000000 HC OXYGEN THERAPY PER DAY

## 2022-01-22 PROCEDURE — 94618 PULMONARY STRESS TESTING: CPT

## 2022-01-22 PROCEDURE — 2580000003 HC RX 258: Performed by: STUDENT IN AN ORGANIZED HEALTH CARE EDUCATION/TRAINING PROGRAM

## 2022-01-22 RX ORDER — DEXAMETHASONE 6 MG/1
6 TABLET ORAL DAILY
Qty: 7 TABLET | Refills: 0 | Status: ON HOLD | OUTPATIENT
Start: 2022-01-23 | End: 2022-01-27 | Stop reason: SDUPTHER

## 2022-01-22 RX ADMIN — INSULIN LISPRO 1 UNITS: 100 INJECTION, SOLUTION INTRAVENOUS; SUBCUTANEOUS at 14:21

## 2022-01-22 RX ADMIN — FAMOTIDINE 20 MG: 10 INJECTION, SOLUTION INTRAVENOUS at 09:35

## 2022-01-22 RX ADMIN — HEPARIN SODIUM 5000 UNITS: 5000 INJECTION INTRAVENOUS; SUBCUTANEOUS at 06:13

## 2022-01-22 RX ADMIN — DEXAMETHASONE 6 MG: 4 TABLET ORAL at 09:34

## 2022-01-22 RX ADMIN — SODIUM CHLORIDE, PRESERVATIVE FREE 10 ML: 5 INJECTION INTRAVENOUS at 09:37

## 2022-01-22 ASSESSMENT — PAIN SCALES - GENERAL
PAINLEVEL_OUTOF10: 0

## 2022-01-22 NOTE — PLAN OF CARE
Problem: Gas Exchange - Impaired  Goal: Absence of hypoxia  Note: Pt weaned to RA this shift, spO2 >90%. Denies sob at rest or on exertion. Continues to work with IS hourly while awake. Will continue to monitor. Problem: Falls - Risk of:  Goal: Will remain free from falls  Description: Will remain free from falls  Note: Pt up in the room independently with steady gait.

## 2022-01-22 NOTE — PROGRESS NOTES
01/22/22 1123   Resting (Room Air)   SpO2 92   HR 64   Resting (On O2)   SpO2 94   HR 64   O2 Device Nasal cannula   O2 Flow Rate (l/min) 1 l/min   During Walk (Room Air)   SpO2 86   HR 68   Walk/Assistance Device Ambulation   Rate of Dyspnea 0   During Walk (On O2)   SpO2 87   HR 91   O2 Device Nasal cannula   O2 Flow Rate (l/min) 1 l/min   Need Additional O2 Flow Rate Rows Yes   O2 Flow Rate (l/min) 2 l/min   O2 Saturation 89   Walk/Assistance Device Ambulation   Rate of Dyspnea 0   After Walk   SpO2 94   HR 86   O2 Device Nasal cannula   O2 Flow Rate (l/min) 2 l/min   Rate of Dyspnea 0   Does the Patient Qualify for Home O2 Yes   Liter Flow at Rest 1   Liter Flow on Exertion 2   Does the Patient Need Portable Oxygen Tanks Yes

## 2022-01-22 NOTE — CARE COORDINATION
Case Management Assessment            Discharge Note                    Date / Time of Note: 1/22/2022 1:12 PM                  Discharge Note Completed by: Milton Harrison RN    Patient Name: Camelia Srinivasan   YOB: 1955  Diagnosis: Acute kidney injury ([facility] [address] [N17.9]  [id number] [U07.1]   Date / Time: 1/20/2022  8:22 PM    Current PCP: Maria Teresa Saeed MD  Clinic patient: No    Hospitalization in the last 30 days: No    Advance Directives:  Code Status: Full Code  [place] DNR form completed and on chart: Not Indicated    Financial:  Payor: MEDICARE / Plan: MEDICARE PART A AND B / Product Type: *No Product type* /      Pharmacy:    [facility] [place] [address]  Phone: [phone] Fax: [phone]      Assistance purchasing medications?:    Assistance provided by Case Management: None at this time    Does patient want to participate in local refill/ meds to beds program?:      Meds To Beds General Rules:  1. Can ONLY be done Monday- Friday between 8:30am-5pm  2. Prescription(s) must be in pharmacy by 3pm to be filled same day  3. Copy of patient's insurance/ prescription drug card and patient face sheet must be sent along with the prescription(s)  4. Cost of Rx cannot be added to hospital bill. If financial assistance is needed, please contact unit  or ;  or  CANNOT provide pharmacy voucher for patients co-pays  5.  Patients can then  the prescription on their way out of the hospital at discharge, or pharmacy can deliver to the bedside if staff is available. (payment due at time of pick-up or delivery - cash, check, or card accepted)     Able to afford home medications/ co-pay costs: Yes    ADLS:  Current PT AM-PAC Score:   /24  Current OT AM-PAC Score:   /24      DISCHARGE Disposition: Home with [address]: [facility]     LOC at discharge: Not Applicable  MILA Completed: Not Indicated    Notification completed in HENS/PAS?:  Not Applicable      Transportation:  Transportation PLAN for discharge: family   Mode of Transport: Private Car  Reason for medical transport: Not Applicable  Name of [facility]: Not Applicable      Home Care:  [address] ordered at discharge: Yes  [address]: [place]  Phone: [phone]  Fax: [phone]  Orders faxed: Yes        Home Oxygen and Respiratory Equipment:  Oxygen needed at discharge?: Yes  [address]: [facility]  Phone: [phone]   Portable tank available for discharge?: Not Indicated        Additional CM Notes:   Patient will go home with spouse, [facility] following for [address] needs, [facility] O2, portable delivered to room. The Plan for Transition of Care is related to the following treatment goals of Acute kidney injury ([facility] [address] [N17.9]  COVID-19 [U07.1]    The Patient and/or patient representative Mayuri Chan and his family were provided with a choice of provider and agrees with the discharge plan Yes    Freedom of choice list was provided with basic dialogue that supports the patient's individualized plan of care/goals and shares the quality data associated with the providers.  Yes    Care Transitions patient: No    Eileen Farias RN  [facility] [employer]  Case Management Department  Ph: [phone]  Fax: [phone]

## 2022-01-22 NOTE — DISCHARGE SUMMARY
INTERNAL MEDICINE DEPARTMENT AT [address] [place] [address]  DISCHARGE SUMMARY    Patient ID: Yvonna Renan                                             Discharge Date: 1/22/2022   Patient's PCP: Sharad Daley MD                                          Discharge Physician: Berenice Rossi MD MD  Admit Date: 1/20/2022   Admitting Physician: Berenice Rossi MD    PROBLEMS DURING HOSPITALIZATION:  Present on Admission:   Pneumonia due to COVID-19 virus   Essential hypertension   Diabetes mellitus type 2 in obese ([address]   Gastroesophageal reflux disease without esophagitis      DISCHARGE DIAGNOSES:  1. Acute hypoxic respiratory failure secondary to COVID 19   2. COVID 19 PNA   3. Acute kidney injury, resolved   4. DM2    Brief hospital course: This is a 53-year-old gentleman who came in with hypoxia, diarrhea, and a positive home COVID test.  He was admitted for COVID-19 pneumonia requiring 2 L of oxygen and treated with Decadron. His symptom improved significantly. Patient was then discharged home with home oxygen he required 2 L of oxygen on ambulation. He will also complete 7 more days of Decadron to complete the course. He was instructed to stay isolated for at least 5 days. Patient has no acute complaint on discharge day. Physical Exam:  /72   Pulse 74   Temp 98.6 °F (37 °C) (Oral)   Resp 20   Ht 5' 11\" (1.803 m)   Wt 228 lb 13.4 oz (103.8 kg)   SpO2 92%   BMI 31.92 kg/m²   Physical Exam  Vitals and nursing note reviewed. Constitutional:       General: He is not in acute distress. Appearance: He is well-developed. He is not ill-appearing or diaphoretic. HENT:      Head: Normocephalic and atraumatic. Eyes:      General: No scleral icterus. Conjunctiva/sclera: Conjunctivae normal.      Pupils: Pupils are equal, round, and reactive to light. Cardiovascular:      Rate and Rhythm: Normal rate and regular rhythm. Pulses: Normal pulses. Heart sounds: Normal heart sounds.  No murmur heard.  No friction rub. No gallop. Pulmonary:      Effort: Pulmonary effort is normal. No respiratory distress. Breath sounds: Normal breath sounds. No wheezing or rales. Chest:      Chest wall: No tenderness. Abdominal:      General: Bowel sounds are normal. There is no distension. Palpations: Abdomen is soft. Tenderness: There is no abdominal tenderness. There is no guarding. Musculoskeletal:         General: No swelling, tenderness or deformity. Normal range of motion. Cervical back: Normal range of motion and neck supple. Skin:     General: Skin is warm and dry. Neurological:      General: No focal deficit present. Mental Status: He is alert and oriented to person, place, and time. Cranial Nerves: No cranial nerve deficit. Sensory: No sensory deficit.    Psychiatric:         Behavior: Behavior normal.         Consults: None   Significant Diagnostic Studies:  chest x-ray  Treatments: IV hydration and oxygen supplement  Disposition: home  Discharged Condition: Stable  Follow Up: Primary Care Physician in two weeks    DISCHARGE MEDICATION:     Medication List      START taking these medications    dexamethasone 6 MG tablet  Commonly known as: DECADRON  Take 1 tablet by mouth daily for 7 days  Start taking on: January 23, 2022        Mary Garcia taking these medications    metFORMIN 500 MG tablet  Commonly known as: GLUCOPHAGE     naproxen 500 MG tablet  Commonly known as: Naprosyn  Take 1 tablet by mouth 2 times daily (with meals)     omeprazole 20 MG delayed release capsule  Commonly known as: PRILOSEC  Take 1 capsule by mouth every morning (before breakfast)     pravastatin 20 MG tablet  Commonly known as: PRAVACHOL     valsartan-hydroCHLOROthiazide 160-12.5 MG per tablet  Commonly known as: DIOVAN-HCT  Take 1 tablet by mouth daily           Where to Get Your Medications      These medications were sent to [facility], [address] -  [phone] Goyo Cloud [phone]  [address] [address]    Phone: [phone]   · dexamethasone 6 MG tablet       Activity: activity as tolerated  Diet: regular diet  Wound Care: none needed    Time Spent on discharge is more than 30 minutes    Signed:  Everett Dickey MD, PGY-3  Please contact via Perfect serve   1/22/2022     Addendum to Resident H& P/Progress note:  I have personally seen,examined and evaluated the patient.  I have reviewed the current history, physical findings, labs and assessment and plan and agree with note as documented by resident MD ( Tara Pagan)      Yahir Chacko MD, Yovana Barragan

## 2022-01-22 NOTE — PLAN OF CARE
Problem: Gas Exchange - Impaired  Goal: Absence of hypoxia  1/22/2022 0255 by Michael Barrios RN  Outcome: Ongoing  Note: SPO2 is 90% on room air. Lungs are diminished. Will continue to monitor. Problem: Falls - Risk of:  Goal: Will remain free from falls  Description: Will remain free from falls  1/22/2022 0257 by Michael Barrios RN  Outcome: Ongoing  Note: Pt is independent and steady on feet. Pt scores as medium fall risk.

## 2022-01-23 LAB
INFLUENZA A BY PCR: NOT DETECTED
INFLUENZA B BY PCR: NOT DETECTED
RSV BY PCR: NOT DETECTED
RSV SOURCE: NORMAL

## 2022-01-23 NOTE — PROGRESS NOTES
IV removed, discharge paper work reviewed with pt and pt's wife on phone - all questions answered to the best of my ability, will escort pt to main lobby to meet ride.

## 2022-01-24 ENCOUNTER — HOSPITAL ENCOUNTER (INPATIENT)
Age: 67
LOS: 3 days | Discharge: HOME HEALTH | DRG: 177 | End: 2022-01-27
Attending: EMERGENCY MEDICINE | Admitting: HOSPITALIST
Payer: MEDICARE

## 2022-01-24 ENCOUNTER — APPOINTMENT (OUTPATIENT)
Dept: GENERAL RADIOLOGY | Age: 67
DRG: 177 | End: 2022-01-24
Payer: MEDICARE

## 2022-01-24 DIAGNOSIS — J12.82 PNEUMONIA DUE TO COVID-19 VIRUS: Primary | ICD-10-CM

## 2022-01-24 DIAGNOSIS — U07.1 PNEUMONIA DUE TO COVID-19 VIRUS: Primary | ICD-10-CM

## 2022-01-24 LAB
ALBUMIN SERPL-MCNC: 3.1 G/DL (ref 3.4–5)
ALP BLD-CCNC: 53 U/L (ref 40–129)
ALT SERPL-CCNC: 95 U/L (ref 10–40)
ANION GAP SERPL CALCULATED.3IONS-SCNC: 14 MMOL/L (ref 3–16)
AST SERPL-CCNC: 60 U/L (ref 15–37)
BASOPHILS ABSOLUTE: 0 K/UL (ref 0–0.2)
BASOPHILS RELATIVE PERCENT: 0.1 %
BILIRUB SERPL-MCNC: 0.6 MG/DL (ref 0–1)
BILIRUBIN DIRECT: 0.3 MG/DL (ref 0–0.3)
BILIRUBIN, INDIRECT: 0.3 MG/DL (ref 0–1)
BUN BLDV-MCNC: 26 MG/DL (ref 7–20)
CALCIUM SERPL-MCNC: 8.8 MG/DL (ref 8.3–10.6)
CHLORIDE BLD-SCNC: 98 MMOL/L (ref 99–110)
CO2: 25 MMOL/L (ref 21–32)
CREAT SERPL-MCNC: 0.8 MG/DL (ref 0.8–1.3)
D DIMER: <200 NG/ML DDU (ref 0–229)
EKG ATRIAL RATE: 89 BPM
EKG DIAGNOSIS: NORMAL
EKG P AXIS: 50 DEGREES
EKG P-R INTERVAL: 174 MS
EKG Q-T INTERVAL: 350 MS
EKG QRS DURATION: 82 MS
EKG QTC CALCULATION (BAZETT): 425 MS
EKG R AXIS: 42 DEGREES
EKG T AXIS: 0 DEGREES
EKG VENTRICULAR RATE: 89 BPM
EOSINOPHILS ABSOLUTE: 0 K/UL (ref 0–0.6)
EOSINOPHILS RELATIVE PERCENT: 0 %
FERRITIN: 1378 NG/ML (ref 30–400)
GFR AFRICAN AMERICAN: >60
GFR NON-AFRICAN AMERICAN: >60
GLUCOSE BLD-MCNC: 115 MG/DL (ref 70–99)
GLUCOSE BLD-MCNC: 131 MG/DL (ref 70–99)
GLUCOSE BLD-MCNC: 142 MG/DL (ref 70–99)
HCT VFR BLD CALC: 47 % (ref 40.5–52.5)
HEMOGLOBIN: 16.2 G/DL (ref 13.5–17.5)
LACTATE DEHYDROGENASE: 313 U/L (ref 100–190)
LYMPHOCYTES ABSOLUTE: 0.6 K/UL (ref 1–5.1)
LYMPHOCYTES RELATIVE PERCENT: 9.7 %
MCH RBC QN AUTO: 30.7 PG (ref 26–34)
MCHC RBC AUTO-ENTMCNC: 34.4 G/DL (ref 31–36)
MCV RBC AUTO: 89.3 FL (ref 80–100)
MONOCYTES ABSOLUTE: 0.7 K/UL (ref 0–1.3)
MONOCYTES RELATIVE PERCENT: 11.5 %
NEUTROPHILS ABSOLUTE: 5 K/UL (ref 1.7–7.7)
NEUTROPHILS RELATIVE PERCENT: 78.7 %
PDW BLD-RTO: 13.4 % (ref 12.4–15.4)
PERFORMED ON: ABNORMAL
PERFORMED ON: ABNORMAL
PLATELET # BLD: 248 K/UL (ref 135–450)
PMV BLD AUTO: 7.6 FL (ref 5–10.5)
POTASSIUM REFLEX MAGNESIUM: 4.1 MMOL/L (ref 3.5–5.1)
PRO-BNP: 154 PG/ML (ref 0–124)
RBC # BLD: 5.26 M/UL (ref 4.2–5.9)
SODIUM BLD-SCNC: 137 MMOL/L (ref 136–145)
TOTAL CK: 149 U/L (ref 39–308)
TOTAL PROTEIN: 6.7 G/DL (ref 6.4–8.2)
TROPONIN: <0.01 NG/ML
WBC # BLD: 6.4 K/UL (ref 4–11)

## 2022-01-24 PROCEDURE — 85025 COMPLETE CBC W/AUTO DIFF WBC: CPT

## 2022-01-24 PROCEDURE — 99284 EMERGENCY DEPT VISIT MOD MDM: CPT

## 2022-01-24 PROCEDURE — 83880 ASSAY OF NATRIURETIC PEPTIDE: CPT

## 2022-01-24 PROCEDURE — 80048 BASIC METABOLIC PNL TOTAL CA: CPT

## 2022-01-24 PROCEDURE — 36415 COLL VENOUS BLD VENIPUNCTURE: CPT

## 2022-01-24 PROCEDURE — 80076 HEPATIC FUNCTION PANEL: CPT

## 2022-01-24 PROCEDURE — 6360000002 HC RX W HCPCS

## 2022-01-24 PROCEDURE — 71045 X-RAY EXAM CHEST 1 VIEW: CPT

## 2022-01-24 PROCEDURE — 2700000000 HC OXYGEN THERAPY PER DAY

## 2022-01-24 PROCEDURE — 6370000000 HC RX 637 (ALT 250 FOR IP)

## 2022-01-24 PROCEDURE — 93005 ELECTROCARDIOGRAM TRACING: CPT | Performed by: EMERGENCY MEDICINE

## 2022-01-24 PROCEDURE — 85379 FIBRIN DEGRADATION QUANT: CPT

## 2022-01-24 PROCEDURE — 2580000003 HC RX 258

## 2022-01-24 PROCEDURE — 1200000000 HC SEMI PRIVATE

## 2022-01-24 PROCEDURE — C9113 INJ PANTOPRAZOLE SODIUM, VIA: HCPCS

## 2022-01-24 PROCEDURE — 82728 ASSAY OF FERRITIN: CPT

## 2022-01-24 PROCEDURE — 82550 ASSAY OF CK (CPK): CPT

## 2022-01-24 PROCEDURE — 94761 N-INVAS EAR/PLS OXIMETRY MLT: CPT

## 2022-01-24 PROCEDURE — 83615 LACTATE (LD) (LDH) ENZYME: CPT

## 2022-01-24 PROCEDURE — 84484 ASSAY OF TROPONIN QUANT: CPT

## 2022-01-24 PROCEDURE — 99222 1ST HOSP IP/OBS MODERATE 55: CPT | Performed by: HOSPITALIST

## 2022-01-24 RX ORDER — INSULIN LISPRO 100 [IU]/ML
0-3 INJECTION, SOLUTION INTRAVENOUS; SUBCUTANEOUS NIGHTLY
Status: DISCONTINUED | OUTPATIENT
Start: 2022-01-24 | End: 2022-01-27 | Stop reason: HOSPADM

## 2022-01-24 RX ORDER — DEXAMETHASONE 4 MG/1
6 TABLET ORAL DAILY
Status: DISCONTINUED | OUTPATIENT
Start: 2022-01-24 | End: 2022-01-27 | Stop reason: HOSPADM

## 2022-01-24 RX ORDER — NICOTINE POLACRILEX 4 MG
15 LOZENGE BUCCAL PRN
Status: DISCONTINUED | OUTPATIENT
Start: 2022-01-24 | End: 2022-01-27 | Stop reason: HOSPADM

## 2022-01-24 RX ORDER — SODIUM CHLORIDE 9 MG/ML
25 INJECTION, SOLUTION INTRAVENOUS PRN
Status: DISCONTINUED | OUTPATIENT
Start: 2022-01-24 | End: 2022-01-27 | Stop reason: HOSPADM

## 2022-01-24 RX ORDER — VALSARTAN AND HYDROCHLOROTHIAZIDE 160; 12.5 MG/1; MG/1
1 TABLET, FILM COATED ORAL DAILY
Status: DISCONTINUED | OUTPATIENT
Start: 2022-01-24 | End: 2022-01-27 | Stop reason: HOSPADM

## 2022-01-24 RX ORDER — SODIUM CHLORIDE 0.9 % (FLUSH) 0.9 %
5-40 SYRINGE (ML) INJECTION EVERY 12 HOURS SCHEDULED
Status: DISCONTINUED | OUTPATIENT
Start: 2022-01-24 | End: 2022-01-27 | Stop reason: HOSPADM

## 2022-01-24 RX ORDER — SODIUM CHLORIDE 0.9 % (FLUSH) 0.9 %
5-40 SYRINGE (ML) INJECTION PRN
Status: DISCONTINUED | OUTPATIENT
Start: 2022-01-24 | End: 2022-01-27 | Stop reason: HOSPADM

## 2022-01-24 RX ORDER — ONDANSETRON 4 MG/1
4 TABLET, ORALLY DISINTEGRATING ORAL EVERY 8 HOURS PRN
Status: DISCONTINUED | OUTPATIENT
Start: 2022-01-24 | End: 2022-01-27 | Stop reason: HOSPADM

## 2022-01-24 RX ORDER — DEXTROSE MONOHYDRATE 50 MG/ML
100 INJECTION, SOLUTION INTRAVENOUS PRN
Status: DISCONTINUED | OUTPATIENT
Start: 2022-01-24 | End: 2022-01-27 | Stop reason: HOSPADM

## 2022-01-24 RX ORDER — ACETAMINOPHEN 325 MG/1
650 TABLET ORAL EVERY 6 HOURS PRN
Status: DISCONTINUED | OUTPATIENT
Start: 2022-01-24 | End: 2022-01-27 | Stop reason: HOSPADM

## 2022-01-24 RX ORDER — PANTOPRAZOLE SODIUM 40 MG/10ML
40 INJECTION, POWDER, LYOPHILIZED, FOR SOLUTION INTRAVENOUS DAILY
Status: DISCONTINUED | OUTPATIENT
Start: 2022-01-24 | End: 2022-01-25

## 2022-01-24 RX ORDER — DEXTROSE MONOHYDRATE 25 G/50ML
12.5 INJECTION, SOLUTION INTRAVENOUS PRN
Status: DISCONTINUED | OUTPATIENT
Start: 2022-01-24 | End: 2022-01-24 | Stop reason: SDUPTHER

## 2022-01-24 RX ORDER — POLYETHYLENE GLYCOL 3350 17 G/17G
17 POWDER, FOR SOLUTION ORAL DAILY PRN
Status: DISCONTINUED | OUTPATIENT
Start: 2022-01-24 | End: 2022-01-27 | Stop reason: HOSPADM

## 2022-01-24 RX ORDER — INSULIN LISPRO 100 [IU]/ML
0-6 INJECTION, SOLUTION INTRAVENOUS; SUBCUTANEOUS
Status: DISCONTINUED | OUTPATIENT
Start: 2022-01-24 | End: 2022-01-27 | Stop reason: HOSPADM

## 2022-01-24 RX ORDER — ONDANSETRON 2 MG/ML
4 INJECTION INTRAMUSCULAR; INTRAVENOUS EVERY 6 HOURS PRN
Status: DISCONTINUED | OUTPATIENT
Start: 2022-01-24 | End: 2022-01-27 | Stop reason: HOSPADM

## 2022-01-24 RX ORDER — ACETAMINOPHEN 650 MG/1
650 SUPPOSITORY RECTAL EVERY 6 HOURS PRN
Status: DISCONTINUED | OUTPATIENT
Start: 2022-01-24 | End: 2022-01-27 | Stop reason: HOSPADM

## 2022-01-24 RX ORDER — PRAVASTATIN SODIUM 40 MG
40 TABLET ORAL NIGHTLY
Status: DISCONTINUED | OUTPATIENT
Start: 2022-01-24 | End: 2022-01-27 | Stop reason: HOSPADM

## 2022-01-24 RX ADMIN — PRAVASTATIN SODIUM 40 MG: 40 TABLET ORAL at 22:21

## 2022-01-24 RX ADMIN — SODIUM CHLORIDE, PRESERVATIVE FREE 10 ML: 5 INJECTION INTRAVENOUS at 18:26

## 2022-01-24 RX ADMIN — DEXAMETHASONE 6 MG: 4 TABLET ORAL at 18:22

## 2022-01-24 RX ADMIN — VALSARTAN AND HYDROCHLOROTHIAZIDE 1 TABLET: 160; 12.5 TABLET, FILM COATED ORAL at 22:22

## 2022-01-24 RX ADMIN — SODIUM CHLORIDE, PRESERVATIVE FREE 10 ML: 5 INJECTION INTRAVENOUS at 22:22

## 2022-01-24 RX ADMIN — ENOXAPARIN SODIUM 40 MG: 100 INJECTION SUBCUTANEOUS at 18:22

## 2022-01-24 RX ADMIN — PANTOPRAZOLE SODIUM 40 MG: 40 INJECTION, POWDER, FOR SOLUTION INTRAVENOUS at 18:22

## 2022-01-24 ASSESSMENT — ENCOUNTER SYMPTOMS
GASTROINTESTINAL NEGATIVE: 1
EYES NEGATIVE: 1
RESPIRATORY NEGATIVE: 1

## 2022-01-24 NOTE — ED NOTES
Bed: A04-04  Expected date:   Expected time:   Means of arrival:   Comments:  James Byers RN  01/24/22 5346

## 2022-01-24 NOTE — H&P
Internal Medicine  PGY 1  History & Physical      CC SOB    History Obtained From:  patient    HISTORY OF PRESENT ILLNESS:  Pt is a 59-year-old  Male w a PMHX of COVID PNA 1/22/2022, T2DM who came in with hypoxia, diarrhea, and a positive home COVID test on his last admission and was discharged 1/22/2022 with 2L NC. Patient came back today (1/24/2022) due to persistent oxygenation of 88 to 90 % on 2 L with increased O2 when sleeping to 4 L NC. Patient was concerned and said he would feel better if he was in the hospital. Patient had no symptoms, just concerned about his O2 sats. His symptom improved significantly. ED Course:  Patient was sitting on the chair bedside with no complaints and not feeling any SOB. Patient was on 4L NC saturating at 89%. Patient PE was negative for any acute defects and his lungs sounded clear bilaterally. Patient labs were unremarkable and the CXR looks unchanged since discharged. Patient will be admitted for   Past Medical History:        Diagnosis Date    Diabetes mellitus ([facility] [address]     Gastroesophageal reflux disease without esophagitis     Hypertension     Rash    ·     Past Surgical History:    · History reviewed. No pertinent surgical history. No current facility-administered medications on file prior to encounter.      Current Outpatient Medications on File Prior to Encounter   Medication Sig Dispense Refill    dexamethasone (DECADRON) 6 MG tablet Take 1 tablet by mouth daily for 7 days 7 tablet 0    valsartan-hydroCHLOROthiazide (DIOVAN-HCT) 160-12.5 MG per tablet Take 1 tablet by mouth daily 90 tablet 1    omeprazole (PRILOSEC) 20 MG delayed release capsule Take 1 capsule by mouth every morning (before breakfast) 90 capsule 0    naproxen (NAPROSYN) 500 MG tablet Take 1 tablet by mouth 2 times daily (with meals) (Patient not taking: Reported on 11/12/2021) 14 tablet 0    pravastatin (PRAVACHOL) 20 MG tablet Take 20 mg by mouth      metFORMIN (GLUCOPHAGE) 500 MG BMP:   Recent Labs     01/22/22  0556 01/24/22  0911    137   K 3.9 4.1   CL 99 98*   CO2 31 25   BUN 34* 26*   CREATININE 0.9 0.8   GLUCOSE 138* 115*     LFT's:   Recent Labs     01/22/22  0556   *   ALT 79*   BILITOT 0.5   ALKPHOS 48     Troponin:   Recent Labs     01/24/22  0911   TROPONINI <0.01         XR CHEST PORTABLE   Final Result      Patchy multifocal consolidation similar to 1/20/2022. Elevated right hemidiaphragm. Stable borderline cardiomegaly. ASSESSMENT AND PLAN:  Pt is a 77-year-old  Male w a PMHX of COVID PNA 1/22/2022, T2DM who came in with hypoxia, diarrhea, and a positive home COVID test on his last admission and was discharged 1/22/2022 with 2L NC. Patient came back today (1/24/2022) due to persistent oxygenation of 88 to 90 % on 2 L with increased O2 when sleeping to 4 L NC     Hypoxia 2/2 COVID Pneumonia  Pt unvaccinated to [facility]. Pt recently discharged on 1/22/2022 on 2L NC but felt more SOB at home. Patient feels safer here in case he has increase oxygenation. Patient anxious and constantly checking his O2 sats. Patient had increased O2 requirments in the ED to 4L NC with sats in the low 90s. - Tested positive for COVID on day of admission (1/20)  - Requiring 4L NC  - Decadron 6 mg daily for 6 more days  - Ferritin, CRP, and D-Dimer pending  - Droplet plus isolation    T2DM  Last A1c was 6.5 in 4/2021  - LDSSI  - Hypoglycemia protocol and POC glucose checks      HTN  - home valsartan-HCTZ      HLD  - Continue home pravastatin    Will discuss with attending physician Radhika Fish    Code Status:Full code  FEN: Adult diet   PPX: Lovenox, Protonix   DISPO: JAIRO Box MD  1/24/2022,  11:42 AM    Addendum to Resident H& P/Progress note:  I have personally seen,examined and evaluated the patient.  I have reviewed the current history, physical findings, labs and assessment and plan and agree with note as documented by resident MD ( Corrinne Overall)      Jennifer Turner MD, FACP

## 2022-01-24 NOTE — ED TRIAGE NOTES
Patient arrives c/o shortness of breath. Patient is positive for COVID-19 and states that he felt extra short of breath this morning so he checked his pulse oximeter this morning and it was in the low 80s. Patient states that he was wearing 2L at home but it was not helping. Patient requiring 5-6L to sat 90% or greater.

## 2022-01-24 NOTE — ED PROVIDER NOTES
[address] [facility]          ATTENDING PHYSICIAN NOTE       Date of evaluation: 1/24/2022    Chief Complaint     Positive For Covid-19 and Shortness of Breath (Low 80s while on home 2L)      History of Present Illness     Tammy Pineda is a 74 y.o. male with history of diabetes, hypertension, GERD presenting to the emergency department today for shortness of breath and hypoxia. Patient was recently discharged on 1/22 from the hospital on 2 L nasal cannula oxygen after diagnosis of Covid pneumonia. At home he was wearing his nasal cannula oxygen and continued his dexamethasone but has had worsening shortness of breath and fatigue. He was noted to be hypoxic into the 80s for EMS. He denies any chest pain, ongoing fevers, or other new symptoms. No history of DVT/PE. Review of Systems     Pertinent positive and negative findings as documented in the HPI. Otherwise all other systems were reviewed and were negative. Physical Exam     INITIAL VITALS: BP: (!) 142/75, Temp: 99.6 °F (37.6 °C), Pulse: 86, Resp: 18, SpO2: 90 %     Nursing note and vitals reviewed. General:  Adult male, alert and appropriately interactive. In no distress. HENT: Normocephalic and atraumatic. External ears normal. Nose appears normal externally. Eyes: Conjunctivae normal. No scleral icterus. Neck: Neck supple. No tracheal deviation present. CV: Normal rate. Irregular rhythm. S1/S2 auscultated. No murmurs, gallops or rubs. Pulm: Mildly increased work of breathing on nasal cannula oxygen. Diffuse rhonchi, no wheezes or prolonged expiratory phase. GI: Soft. No distension. No tenderness. No rebound or guarding. No masses. No peritoneal signs. Musculoskeletal: No edema. No gross deformities. Neurological: Alert and appropriately interactive. Face symmetric, speech without dysarthria or obvious aphasia. Moving all extremities spontaneously. Skin: Warm, dry. No rash.  No diaphoresis or erythema. Psychiatric: Calm and cooperative with appropriate mood and affect. Procedures   Procedures    MEDICAL DECISION MAKING     MDM: Radhika Monreal is a 74 y.o. male with history of recent Covid pneumonia diagnosis currently on 2 L outpatient nasal cannula oxygen presenting to the emergency department today for shortness of breath and hypoxia. On arrival, patient is in no distress but does have a mildly increased work of breathing. He is hypoxic into the low 80s on his baseline 2 L nasal cannula. He was increased to 5 L nasal cannula oxygen with improvement in his symptoms and his hypoxia. Symptoms consistent with ongoing Covid pneumonia. No symptoms or signs to suggest DVT/PE at this stage of his disease. Labs are stable as is his x-ray. He was able to be weaned to 4 L nasal cannula, however is continuing to require more oxygen than his recent baseline. As such, discussed with his PCP who is in agreement with admission and will also discussed with the AOD. Clinical Impression     1. Pneumonia due to COVID-19 virus        Disposition     DISPOSITION Decision To Admit 01/24/2022 09:51:22 AM        Hernando aRi MD  10:18 AM                     Past Medical, Surgical, Family, and Social History     He has a past medical history of Diabetes mellitus ([facility] [address] Gastroesophageal reflux disease without esophagitis, Hypertension, and Rash. He has no past surgical history on file. His family history includes Diabetes in his mother; No Known Problems in his father. He reports that he has never smoked. He has never used smokeless tobacco. He reports current alcohol use. He reports that he does not use drugs.     Medications     Previous Medications    DEXAMETHASONE (DECADRON) 6 MG TABLET    Take 1 tablet by mouth daily for 7 days    METFORMIN (GLUCOPHAGE) 500 MG TABLET    Take 500 mg by mouth    NAPROXEN (NAPROSYN) 500 MG TABLET    Take 1 tablet by mouth 2 times daily (with meals)    OMEPRAZOLE (PRILOSEC) 20 MG DELAYED RELEASE CAPSULE    Take 1 capsule by mouth every morning (before breakfast)    PRAVASTATIN (PRAVACHOL) 20 MG TABLET    Take 20 mg by mouth    VALSARTAN-HYDROCHLOROTHIAZIDE (DIOVAN-HCT) 160-12.5 MG PER TABLET    Take 1 tablet by mouth daily       Allergies     He has No Known Allergies. ED Course     Nursing Notes, Past Medical Hx, Past Surgical Hx, Social Hx,Allergies, and Family Hx were reviewed. Patient was given the following medications:  No orders of the defined types were placed in this encounter. Diagnostic Results     EKG   Sinus rhythm, ventricular rate 89. Frequent PACs noted. Axis normal, intervals normal. No ST changes suggestive of acute ischemia. T wave inversion in lead III and nonspecific T wave flattening in V4-V6. Compared to previous, not significantly changed. RECENT VITALS:  BP: (!) 142/75,Temp: 99.6 °F (37.6 °C), Pulse: 86, Resp: 18, SpO2: 90 %     RADIOLOGY:  XR CHEST PORTABLE   Final Result      Patchy multifocal consolidation similar to 1/20/2022. Elevated right hemidiaphragm. Stable borderline cardiomegaly.           LABS:   Results for orders placed or performed during the hospital encounter of 24/72/20   Basic Metabolic Panel w/ Reflex to MG   Result Value Ref Range    Sodium 137 136 - 145 mmol/L    Potassium reflex Magnesium 4.1 3.5 - 5.1 mmol/L    Chloride 98 (L) 99 - 110 mmol/L    CO2 25 21 - 32 mmol/L    Anion Gap 14 3 - 16    Glucose 115 (H) 70 - 99 mg/dL    BUN 26 (H) 7 - 20 mg/dL    CREATININE 0.8 0.8 - 1.3 mg/dL    GFR Non-African American >60 >60    GFR African American >60 >60    Calcium 8.8 8.3 - 10.6 mg/dL   CBC Auto Differential   Result Value Ref Range    WBC 6.4 4.0 - 11.0 K/uL    RBC 5.26 4.20 - 5.90 M/uL    Hemoglobin 16.2 13.5 - 17.5 g/dL    Hematocrit 47.0 40.5 - 52.5 %    MCV 89.3 80.0 - 100.0 fL    MCH 30.7 26.0 - 34.0 pg    MCHC 34.4 31.0 - 36.0 g/dL    RDW 13.4 12.4 - 15.4 %    Platelets 382 086 - 257 K/uL    MPV 7.6 5.0 - 10.5 fL Neutrophils % 78.7 %    Lymphocytes % 9.7 %    Monocytes % 11.5 %    Eosinophils % 0.0 %    Basophils % 0.1 %    Neutrophils Absolute 5.0 1.7 - 7.7 K/uL    Lymphocytes Absolute 0.6 (L) 1.0 - 5.1 K/uL    Monocytes Absolute 0.7 0.0 - 1.3 K/uL    Eosinophils Absolute 0.0 0.0 - 0.6 K/uL    Basophils Absolute 0.0 0.0 - 0.2 K/uL   Troponin   Result Value Ref Range    Troponin <0.01 <0.01 ng/mL   Brain Natriuretic Peptide   Result Value Ref Range    Pro- (H) 0 - 124 pg/mL   EKG 12 Lead   Result Value Ref Range    Ventricular Rate 89 BPM    Atrial Rate 89 BPM    P-R Interval 174 ms    QRS Duration 82 ms    Q-T Interval 350 ms    QTc Calculation (Bazett) 425 ms    P Axis 50 degrees    R Axis 42 degrees    T Axis 0 degrees    Diagnosis       EKG performed in ER and to be interpreted by ER physician. Confirmed by MD, ER ([id number]),  Phill Zee ([phone]) on 1/24/2022 9:12:13 AM       CONSULTS:  [address] TO:  No follow-up provider specified.     DISCHARGE MEDICATIONS:  New Prescriptions    No medications on file          Lillian Roa MD  01/24/22 9789

## 2022-01-25 LAB
C-REACTIVE PROTEIN: 82.6 MG/L (ref 0–5.1)
GLUCOSE BLD-MCNC: 131 MG/DL (ref 70–99)
GLUCOSE BLD-MCNC: 142 MG/DL (ref 70–99)
GLUCOSE BLD-MCNC: 147 MG/DL (ref 70–99)
GLUCOSE BLD-MCNC: 166 MG/DL (ref 70–99)
PERFORMED ON: ABNORMAL

## 2022-01-25 PROCEDURE — 2580000003 HC RX 258

## 2022-01-25 PROCEDURE — 94150 VITAL CAPACITY TEST: CPT

## 2022-01-25 PROCEDURE — 94761 N-INVAS EAR/PLS OXIMETRY MLT: CPT

## 2022-01-25 PROCEDURE — 2700000000 HC OXYGEN THERAPY PER DAY

## 2022-01-25 PROCEDURE — 6360000002 HC RX W HCPCS

## 2022-01-25 PROCEDURE — 36415 COLL VENOUS BLD VENIPUNCTURE: CPT

## 2022-01-25 PROCEDURE — 99232 SBSQ HOSP IP/OBS MODERATE 35: CPT | Performed by: HOSPITALIST

## 2022-01-25 PROCEDURE — 6370000000 HC RX 637 (ALT 250 FOR IP): Performed by: STUDENT IN AN ORGANIZED HEALTH CARE EDUCATION/TRAINING PROGRAM

## 2022-01-25 PROCEDURE — 6370000000 HC RX 637 (ALT 250 FOR IP)

## 2022-01-25 PROCEDURE — 86140 C-REACTIVE PROTEIN: CPT

## 2022-01-25 PROCEDURE — 1200000000 HC SEMI PRIVATE

## 2022-01-25 RX ORDER — PANTOPRAZOLE SODIUM 40 MG/1
40 TABLET, DELAYED RELEASE ORAL
Status: DISCONTINUED | OUTPATIENT
Start: 2022-01-25 | End: 2022-01-27 | Stop reason: HOSPADM

## 2022-01-25 RX ADMIN — INSULIN LISPRO 1 UNITS: 100 INJECTION, SOLUTION INTRAVENOUS; SUBCUTANEOUS at 20:39

## 2022-01-25 RX ADMIN — SODIUM CHLORIDE, PRESERVATIVE FREE 10 ML: 5 INJECTION INTRAVENOUS at 20:39

## 2022-01-25 RX ADMIN — DEXAMETHASONE 6 MG: 4 TABLET ORAL at 09:18

## 2022-01-25 RX ADMIN — INSULIN LISPRO 1 UNITS: 100 INJECTION, SOLUTION INTRAVENOUS; SUBCUTANEOUS at 09:00

## 2022-01-25 RX ADMIN — BARICITINIB 4 MG: 2 TABLET, FILM COATED ORAL at 12:50

## 2022-01-25 RX ADMIN — SODIUM CHLORIDE, PRESERVATIVE FREE 5 ML: 5 INJECTION INTRAVENOUS at 10:00

## 2022-01-25 RX ADMIN — ENOXAPARIN SODIUM 40 MG: 100 INJECTION SUBCUTANEOUS at 09:18

## 2022-01-25 RX ADMIN — VALSARTAN AND HYDROCHLOROTHIAZIDE 1 TABLET: 160; 12.5 TABLET, FILM COATED ORAL at 09:19

## 2022-01-25 RX ADMIN — PANTOPRAZOLE SODIUM 40 MG: 40 TABLET, DELAYED RELEASE ORAL at 12:51

## 2022-01-25 RX ADMIN — PRAVASTATIN SODIUM 40 MG: 40 TABLET ORAL at 20:38

## 2022-01-25 ASSESSMENT — ENCOUNTER SYMPTOMS
NAUSEA: 0
COUGH: 0
SHORTNESS OF BREATH: 0
CONSTIPATION: 1
CHEST TIGHTNESS: 0
DIARRHEA: 0
VOMITING: 1
ABDOMINAL PAIN: 0
ALLERGIC/IMMUNOLOGIC NEGATIVE: 1

## 2022-01-25 ASSESSMENT — PAIN SCALES - GENERAL
PAINLEVEL_OUTOF10: 0

## 2022-01-25 NOTE — CARE COORDINATION
This patient has history of diabetes, hypertension, GERD presenting to the emergency department today for shortness of breath and hypoxia. Patient was recently discharged on 1/22 from the hospital on 2 L nasal cannula oxygen after diagnosis of Covid pneumonia. At home he was wearing his nasal cannula oxygen and continued his dexamethasone but has had worsening shortness of breath and fatigue. He was noted to be hypoxic into the 80s for EMS. He denies any chest pain, ongoing fevers, or other new symptoms. No history of DVT/PE. This patient detriorated at home and is a readmit positive with COVID. He was discharged with [facility] and O2 from [facility] on 1/22/22. Patient not answering phone. CM will continue to follow patient until discharge for any extra needs.   Electronically signed by Ildefonso Alcantara RN on 1/25/2022 at 5:55 PM

## 2022-01-25 NOTE — PROGRESS NOTES
Progress Note    Admit Date: 1/24/2022  Day: 1  Diet: ADULT DIET; Regular; Low Sodium (2 gm)    CC: covid hypoxia    Interval history: NAEO, O2 requirement increased to 10L ovn, down to 5L now. No complaints at this time. HPI: 61-year-old  Male w a PMHX of COVID PNA 1/22/2022, T2DM who came in with hypoxia, diarrhea, and a positive home COVID test on his last admission and was discharged 1/22/2022 with 2L NC. Patient came back today (1/24/2022) due to persistent oxygenation of 88 to 90 % on 2 L with increased O2 when sleeping to 4 L NC. Patient was concerned and said he would feel better if he was in the hospital. Patient had no symptoms, just concerned about his O2 sats.  His symptom improved significantly.      ED Course:  Patient was sitting on the chair bedside with no complaints and not feeling any SOB. Patient was on 4L NC saturating at 89%. Patient PE was negative for any acute defects and his lungs sounded clear bilaterally. Patient labs were unremarkable and the CXR looks unchanged since discharged.      Medications:     Scheduled Meds:   sodium chloride flush  5-40 mL IntraVENous 2 times per day    enoxaparin  40 mg SubCUTAneous Daily    dexamethasone  6 mg Oral Daily    pravastatin  40 mg Oral Nightly    valsartan-hydroCHLOROthiazide  1 tablet Oral Daily    insulin lispro  0-6 Units SubCUTAneous TID WC    insulin lispro  0-3 Units SubCUTAneous Nightly    pantoprazole  40 mg IntraVENous Daily     Continuous Infusions:   sodium chloride      dextrose       PRN Meds:sodium chloride flush, sodium chloride, ondansetron **OR** ondansetron, polyethylene glycol, acetaminophen **OR** acetaminophen, glucose, glucagon (rDNA), dextrose, dextrose bolus (hypoglycemia) **OR** dextrose bolus (hypoglycemia)    Objective:   Vitals:   T-max:  Patient Vitals for the past 8 hrs:   BP Temp Temp src Pulse Resp SpO2   01/25/22 0427 -- -- -- -- 16 91 %   01/25/22 0409 (!) 149/87 98.2 °F (36.8 °C) Oral 82 16 91 % 01/25/22 0050 -- -- -- -- 16 93 %   01/25/22 0012 (!) 169/80 98 °F (36.7 °C) Oral 86 18 94 %   01/24/22 2253 -- -- -- -- 18 93 %   01/24/22 2220 (!) 134/91 98.2 °F (36.8 °C) Oral 87 20 92 %     No intake or output data in the 24 hours ending 01/25/22 0708    Review of Systems   Constitutional: Negative for chills and fever. Respiratory: Negative for cough, chest tightness and shortness of breath. Cardiovascular: Negative for chest pain, palpitations and leg swelling. Gastrointestinal: Positive for constipation and vomiting. Negative for abdominal pain, diarrhea and nausea. Genitourinary: Negative for dysuria. Musculoskeletal: Negative. Skin: Negative. Allergic/Immunologic: Negative. Neurological: Negative. Hematological: Negative. Psychiatric/Behavioral: Negative. Vitals:    01/25/22 0409 01/25/22 0427 01/25/22 0812 01/25/22 0913   BP: (!) 149/87   131/86   Pulse: 82   86   Resp: 16 16  18   Temp: 98.2 °F (36.8 °C)   98.2 °F (36.8 °C)   TempSrc: Oral   Oral   SpO2: 91% 91% 91% 91%         Physical Exam  Constitutional:       General: He is not in acute distress. Appearance: Normal appearance. HENT:      Head: Normocephalic and atraumatic. Right Ear: External ear normal.      Left Ear: External ear normal.      Nose: Nose normal.      Mouth/Throat:      Pharynx: Oropharynx is clear. Eyes:      General: No scleral icterus. Cardiovascular:      Rate and Rhythm: Normal rate and regular rhythm. Pulses: Normal pulses. Heart sounds: No murmur heard. Pulmonary:      Effort: Pulmonary effort is normal. No respiratory distress. Comments: On 5L NC, appears comfortable  Abdominal:      General: Abdomen is flat. Palpations: Abdomen is soft. Tenderness: There is no abdominal tenderness. Musculoskeletal:      Right lower leg: No edema. Left lower leg: No edema. Skin:     General: Skin is warm and dry.    Neurological:      General: No focal deficit present. Mental Status: He is alert and oriented to person, place, and time. Motor: No weakness. Psychiatric:         Mood and Affect: Mood normal.         LABS:    CBC:   Recent Labs     01/24/22  0911   WBC 6.4   HGB 16.2   HCT 47.0      MCV 89.3     Renal:    Recent Labs     01/24/22  0911      K 4.1   CL 98*   CO2 25   BUN 26*   CREATININE 0.8   GLUCOSE 115*   CALCIUM 8.8   ANIONGAP 14     Hepatic:   Recent Labs     01/24/22  1806   AST 60*   ALT 95*   BILITOT 0.6   BILIDIR 0.3   PROT 6.7   LABALBU 3.1*   ALKPHOS 53     Troponin:   Recent Labs     01/24/22  0911   TROPONINI <0.01     -----------------  RAD:   XR CHEST PORTABLE   Final Result      Patchy multifocal consolidation similar to 1/20/2022. Elevated right hemidiaphragm. Stable borderline cardiomegaly. Assessment/Plan:     Hypoxia 2/2 COVID Pneumonia - stable  Pt unvaccinated to [facility]. Pt recently discharged on 1/22/2022 on 2L NC but felt more SOB at home. Patient feels safer here in case he has increase oxygenation. Patient anxious and constantly checking his O2 sats. Patient had increased O2 requirments in the ED to 4L NC with sats in the low 90s. CRP in 80's. - Tested positive for COVID on day of admission (1/20)  - Requiring 5L NC  - Decadron 6 mg daily for 6 more days  - baricitinib   - Droplet plus isolation     T2DM  Last A1c was 6.5 in 4/2021  - LDSSI  - Hypoglycemia protocol and POC glucose checks      HTN  - home valsartan-HCTZ      HLD  - Continue home pravastatin     Will discuss with attending physician Terrell Thomason     Code Status:Full code  FEN: Adult diet            PPX: Lovenox, Protonix   DISPO: Missy Hussein MD, PGY-1  01/25/22  6:11 AM    This patient has been staffed and discussed with Rosa Levin MD.     Addendum to Resident H& P/Progress note:  I have personally seen,examined and evaluated the patient.  I have reviewed the current history, physical findings, labs and assessment and plan and agree with note as documented by resident MD ( Tal Gonzalez)    Accucheck Glucoses: Recent Labs     01/24/22  1732 01/24/22  2000 01/25/22  0732 01/25/22  1203 01/25/22  [facility] Oz Weinberg MD, [facility]

## 2022-01-25 NOTE — ACP (ADVANCE CARE PLANNING)
Advance Care Planning     Advance Care Planning Inpatient Note  [facility] Department    Today's Date: 1/25/2022  Unit: [address]    Received request from IDT Member. Upon review of chart and communication with care team, patient's decision making abilities are not in question. . Patient was/were present in the room during visit. Goals of ACP Conversation:  Discuss advance care planning documents    Health Care Decision Makers:       Primary Decision Maker: Kristen Carpenter - [phone]    Secondary Decision Maker: Summer Lopes - Child - [phone]    Summary:  Verified Healthcare Decision Maker    Advance Care Planning Documents (Patient Wishes):  None     Assessment:   contacted patient via telephone (due to isolation protocol) to discuss [employer] document and [facility] for [employer]. Patient states that he is already aware that his spouse is his default decision maker, followed by his daughter. When asked if he was comfortable with his current decision makers, patient inquired Angy Ellington they (wife & dtr) be switched without my wife knowing? \"  When asked to explain this preference, patient stated that his wife tends to be an \"anxious\" person, whereas his daughter is \"more level headed. \"  Writer explained to patient that if he completed HCPOA document in which he named his daughter as his primary agent, and his spouse as secondary, it would be important to share the document with both decision makers. Writer also reinforced the importance of talking openly with all decision makers about patient's wishes for care. Patient states that he has done this -- even as recently as last week, he shared with his family that he \"would want everything done\" to save his life if he were in a life-threatening condition. Patient ultimately chose to continue with his default decision makers, and not to complete a Healthcare Power of  document. Interventions:  Provided education on documents for clarity and greater understanding  Discussed and provided education on state decision maker hierarchy  Deferred conversation as patient not interested in completing an advance directive at this time    Care Preferences Communicated:   No    Outcomes/Plan:  Patient elects to have his next of kin serve as his decision makers if/when needed.     Electronically signed by Bela Ramos on 1/25/2022 at 2:28 PM

## 2022-01-25 NOTE — FLOWSHEET NOTE
01/25/22 1418   Encounter Summary   Services provided to: Patient   Referral/Consult From: Multi-disciplinary team   Continue Visiting   (1/25 [facility]; ACP discussion)   Complexity of Encounter Low   Length of Encounter 15 minutes   Advance Care Planning Yes   Routine   Type Initial   Assessment Approachable   Intervention Active listening;Explored feelings, thoughts, concerns   Outcome Expressed gratitude;Engaged in conversation;Expressed feelings/needs/concerns;Receptive

## 2022-01-26 ENCOUNTER — APPOINTMENT (OUTPATIENT)
Dept: CT IMAGING | Age: 67
DRG: 177 | End: 2022-01-26
Payer: MEDICARE

## 2022-01-26 LAB
A/G RATIO: 0.8 (ref 1.1–2.2)
ALBUMIN SERPL-MCNC: 3.1 G/DL (ref 3.4–5)
ALP BLD-CCNC: 50 U/L (ref 40–129)
ALT SERPL-CCNC: 67 U/L (ref 10–40)
ANION GAP SERPL CALCULATED.3IONS-SCNC: 10 MMOL/L (ref 3–16)
AST SERPL-CCNC: 33 U/L (ref 15–37)
BASOPHILS ABSOLUTE: 0 K/UL (ref 0–0.2)
BASOPHILS RELATIVE PERCENT: 0 %
BILIRUB SERPL-MCNC: 0.7 MG/DL (ref 0–1)
BUN BLDV-MCNC: 31 MG/DL (ref 7–20)
CALCIUM SERPL-MCNC: 8.8 MG/DL (ref 8.3–10.6)
CHLORIDE BLD-SCNC: 99 MMOL/L (ref 99–110)
CO2: 28 MMOL/L (ref 21–32)
CREAT SERPL-MCNC: 0.8 MG/DL (ref 0.8–1.3)
D DIMER: <200 NG/ML DDU (ref 0–229)
EOSINOPHILS ABSOLUTE: 0 K/UL (ref 0–0.6)
EOSINOPHILS RELATIVE PERCENT: 0.1 %
GFR AFRICAN AMERICAN: >60
GFR NON-AFRICAN AMERICAN: >60
GLUCOSE BLD-MCNC: 110 MG/DL (ref 70–99)
GLUCOSE BLD-MCNC: 120 MG/DL (ref 70–99)
GLUCOSE BLD-MCNC: 136 MG/DL (ref 70–99)
GLUCOSE BLD-MCNC: 143 MG/DL (ref 70–99)
GLUCOSE BLD-MCNC: 157 MG/DL (ref 70–99)
HCT VFR BLD CALC: 47.6 % (ref 40.5–52.5)
HEMOGLOBIN: 16.5 G/DL (ref 13.5–17.5)
LYMPHOCYTES ABSOLUTE: 1.3 K/UL (ref 1–5.1)
LYMPHOCYTES RELATIVE PERCENT: 15.6 %
MAGNESIUM: 2.2 MG/DL (ref 1.8–2.4)
MCH RBC QN AUTO: 31.1 PG (ref 26–34)
MCHC RBC AUTO-ENTMCNC: 34.6 G/DL (ref 31–36)
MCV RBC AUTO: 89.9 FL (ref 80–100)
MONOCYTES ABSOLUTE: 0.9 K/UL (ref 0–1.3)
MONOCYTES RELATIVE PERCENT: 11 %
NEUTROPHILS ABSOLUTE: 6 K/UL (ref 1.7–7.7)
NEUTROPHILS RELATIVE PERCENT: 73.3 %
PDW BLD-RTO: 13.6 % (ref 12.4–15.4)
PERFORMED ON: ABNORMAL
PLATELET # BLD: 324 K/UL (ref 135–450)
PMV BLD AUTO: 7 FL (ref 5–10.5)
POTASSIUM SERPL-SCNC: 3.8 MMOL/L (ref 3.5–5.1)
PROCALCITONIN: 0.07 NG/ML (ref 0–0.15)
RBC # BLD: 5.29 M/UL (ref 4.2–5.9)
SODIUM BLD-SCNC: 137 MMOL/L (ref 136–145)
TOTAL PROTEIN: 7 G/DL (ref 6.4–8.2)
WBC # BLD: 8.1 K/UL (ref 4–11)

## 2022-01-26 PROCEDURE — 99232 SBSQ HOSP IP/OBS MODERATE 35: CPT | Performed by: HOSPITALIST

## 2022-01-26 PROCEDURE — 6370000000 HC RX 637 (ALT 250 FOR IP): Performed by: STUDENT IN AN ORGANIZED HEALTH CARE EDUCATION/TRAINING PROGRAM

## 2022-01-26 PROCEDURE — 6370000000 HC RX 637 (ALT 250 FOR IP)

## 2022-01-26 PROCEDURE — 36415 COLL VENOUS BLD VENIPUNCTURE: CPT

## 2022-01-26 PROCEDURE — 83735 ASSAY OF MAGNESIUM: CPT

## 2022-01-26 PROCEDURE — 84145 PROCALCITONIN (PCT): CPT

## 2022-01-26 PROCEDURE — 71250 CT THORAX DX C-: CPT

## 2022-01-26 PROCEDURE — 1200000000 HC SEMI PRIVATE

## 2022-01-26 PROCEDURE — 99222 1ST HOSP IP/OBS MODERATE 55: CPT | Performed by: INTERNAL MEDICINE

## 2022-01-26 PROCEDURE — 6360000002 HC RX W HCPCS

## 2022-01-26 PROCEDURE — 80053 COMPREHEN METABOLIC PANEL: CPT

## 2022-01-26 PROCEDURE — 94761 N-INVAS EAR/PLS OXIMETRY MLT: CPT

## 2022-01-26 PROCEDURE — 2580000003 HC RX 258

## 2022-01-26 PROCEDURE — 2700000000 HC OXYGEN THERAPY PER DAY

## 2022-01-26 PROCEDURE — 85379 FIBRIN DEGRADATION QUANT: CPT

## 2022-01-26 PROCEDURE — 85025 COMPLETE CBC W/AUTO DIFF WBC: CPT

## 2022-01-26 RX ORDER — FUROSEMIDE 20 MG/1
20 TABLET ORAL ONCE
Status: COMPLETED | OUTPATIENT
Start: 2022-01-26 | End: 2022-01-26

## 2022-01-26 RX ADMIN — PRAVASTATIN SODIUM 40 MG: 40 TABLET ORAL at 21:34

## 2022-01-26 RX ADMIN — DEXAMETHASONE 6 MG: 4 TABLET ORAL at 10:16

## 2022-01-26 RX ADMIN — BARICITINIB 4 MG: 2 TABLET, FILM COATED ORAL at 10:07

## 2022-01-26 RX ADMIN — ENOXAPARIN SODIUM 40 MG: 100 INJECTION SUBCUTANEOUS at 10:06

## 2022-01-26 RX ADMIN — SODIUM CHLORIDE, PRESERVATIVE FREE 10 ML: 5 INJECTION INTRAVENOUS at 21:34

## 2022-01-26 RX ADMIN — SODIUM CHLORIDE, PRESERVATIVE FREE 10 ML: 5 INJECTION INTRAVENOUS at 09:00

## 2022-01-26 RX ADMIN — PANTOPRAZOLE SODIUM 40 MG: 40 TABLET, DELAYED RELEASE ORAL at 06:17

## 2022-01-26 RX ADMIN — INSULIN LISPRO 1 UNITS: 100 INJECTION, SOLUTION INTRAVENOUS; SUBCUTANEOUS at 12:23

## 2022-01-26 RX ADMIN — VALSARTAN AND HYDROCHLOROTHIAZIDE 1 TABLET: 160; 12.5 TABLET, FILM COATED ORAL at 10:07

## 2022-01-26 RX ADMIN — FUROSEMIDE 20 MG: 20 TABLET ORAL at 12:22

## 2022-01-26 RX ADMIN — INSULIN LISPRO 1 UNITS: 100 INJECTION, SOLUTION INTRAVENOUS; SUBCUTANEOUS at 21:34

## 2022-01-26 ASSESSMENT — PAIN SCALES - GENERAL
PAINLEVEL_OUTOF10: 0

## 2022-01-26 ASSESSMENT — ENCOUNTER SYMPTOMS
DIARRHEA: 0
ABDOMINAL PAIN: 0
NAUSEA: 0
VOMITING: 1
ALLERGIC/IMMUNOLOGIC NEGATIVE: 1
SHORTNESS OF BREATH: 0
CONSTIPATION: 1
CHEST TIGHTNESS: 0
COUGH: 0

## 2022-01-26 NOTE — PROGRESS NOTES
Pt on 6L o2 per high flow NC. Sats 90% with activity. Pt denies shortness of breath. NP cough noted. Pt's wife given update x3 this shift.

## 2022-01-26 NOTE — PLAN OF CARE
Problem: Airway Clearance - Ineffective  Goal: Achieve or maintain patent airway  Outcome: Ongoing     Problem: Gas Exchange - Impaired  Goal: Absence of hypoxia  1/26/2022 1519 by Miranda Bonilla RN  Outcome: Ongoing

## 2022-01-26 NOTE — DISCHARGE INSTR - COC
Continuity of Care Form    Patient Name: Willow Snellen   :  1955  MRN:  [id number]    Admit date:  2022  Discharge date:  ***    Code Status Order: Full Code   Advance Directives:      Admitting Physician:  Zoya Dye MD  PCP: Irving Brown MD    Discharging Nurse: [facility] Unit/Room#: 3973/7429-60  Discharging Unit Phone Number: ***    Emergency Contact:   Extended Emergency Contact Information  Primary Emergency Contact: Beto Martinez  Home Phone: [phone]  Relation: Spouse  Secondary Emergency Contact: Blanca Alberto  Home Phone: [phone]  Relation: Child   needed? No    Past Surgical History:  History reviewed. No pertinent surgical history.     Immunization History:   Immunization History   Administered Date(s) Administered    Pneumococcal Conjugate 13-valent Aleja Locke) 10/08/2018    Tdap (Boostrix, Adacel) 2017       Active Problems:  Patient Active Problem List   Diagnosis Code    Essential hypertension I10    Type 2 diabetes mellitus without complication, without long-term current use of insulin ([facility] [address] E11.9    Pneumonia due to COVID-19 virus U07.1, J12.82    Diabetes mellitus type 2 in obese ([facility]) E11.69, E66.9    Gastroesophageal reflux disease without esophagitis K21.9    Hypoxia R09.02    COVID U07.1       Isolation/Infection:   Isolation            Droplet Plus          Patient Infection Status       Infection Onset Added Last Indicated Last Indicated By Review Planned Expiration Resolved Resolved By    COVID-19 22 COVID-19 22      Resolved    COVID-19 (Rule Out) 22 COVID-19 (Ordered)   22 Rule-Out Test Resulted            Nurse Assessment:  Last Vital Signs: /85   Pulse 80   Temp 98.7 °F (37.1 °C) (Oral)   Resp 20   SpO2 91%     Last documented pain score (0-10 scale): Pain Level: 0  Last Weight:   Wt Readings from Last 1 Encounters:   22 228 lb 13.4 oz (103.8 kg) Mental Status:  {IP PT MENTAL STATUS:20030}    IV Access:  { MILA IV ACCESS:671252600}    Nursing Mobility/ADLs:  Walking   {[facility] DME EWNI:576839212}  Transfer  {[facility] DME CYKK:437819516}  Bathing  {[facility] DME RAMN:864036673}  Dressing  {[facility] DME BTSE:527319770}  Toileting  {[facility] DME YYFW:789439462}  Feeding  {[facility] DME JFWV:156609974}  Med Admin  {[facility] DME DXIN:652084592}  Med Delivery   { MILA MED Delivery:074716882}    Wound Care Documentation and Therapy:        Elimination:  Continence: Bowel: {YES / ZT:29425}  Bladder: {YES / NH:00810}  Urinary Catheter: {Urinary Catheter:804061533}   Colostomy/Ileostomy/Ileal Conduit: {YES / HR:04064}       Date of Last BM: ***    Intake/Output Summary (Last 24 hours) at 1/26/2022 0739  Last data filed at 1/25/2022 0834  Gross per 24 hour   Intake 240 ml   Output --   Net 240 ml     I/O last 3 completed shifts: In: 240 [P.O.:240]  Out: -     Safety Concerns:     [address] Safety Concerns:075407541}    Impairments/Disabilities:      [address] Impairments/Disabilities:577139533}    Nutrition Therapy:  Current Nutrition Therapy:   [address] Diet List:651396230}    Routes of Feeding: {[facility] DME Other Feedings:958631311}  Liquids: {Slp liquid thickness:18265}  Daily Fluid Restriction: {P DME Yes amt example:542419499}  Last Modified Barium Swallow with Video (Video Swallowing Test): {Done Not Done QM:750966815}    Treatments at the Time of Hospital Discharge:   Respiratory Treatments: ***  Oxygen Therapy:  {Therapy; copd oxygen:93706}  Ventilator:    { CC Vent QHEF:195760392}    Rehab Therapies: - Vital signs monitoring: Nurse to perform BP, HR, RR, Temp, and Weight with each visit and teach patient and caregivers on taking daily.   Nurse to call physician if pulse less than 50 or greater than 120, respiratory rate less than 12 or greater than 25, oral temperature greater than or equal to 912 oF, systolic BP less than 90 or greater than 330, diastolic BP less than 50 or greater than 100.   - Medication compliance and education for  new medications changes in previous medications safety concerns   - - Resume previous home care orders   - See AVS and Discharge Summary for medication changes and additional instructions  Weight Bearing Status/Restrictions: [address] CC Weight Bearin}  Other Medical Equipment (for information only, NOT a DME order):  {EQUIPMENT:814976365}  Other Treatments: ***    Patient's personal belongings (please select all that are sent with patient):  {CHP DME Belongings:327669140}    RN SIGNATURE:  {Esignature:289012818}    CASE MANAGEMENT/SOCIAL WORK SECTION    Inpatient Status Date: 2022    Readmission Risk Assessment Score:  Readmission Risk              Risk of Unplanned Readmission:  10           Discharging to Facility/  [facility]          [address], [address] [place] [id number]       Phone: [phone]       Fax: [phone]        Home  Oxygen  Increased  Oxygen:  requirement :     [facility] [address]oya. , [address] [facility] [address]       Phone: [phone]       Fax: [phone]        Dialysis Facility (if applicable) NA  Name:  Address:  Dialysis Schedule:  Phone:  Fax:    / signature: Electronically signed by Isaac Bustos RN on 22 at 3:09 PM EST    PHYSICIAN SECTION    Prognosis: {Prognosis:9782053426}    Condition at Discharge: [address] Rosario Patient Condition:467069997}    Rehab Potential (if transferring to Rehab): {Prognosis:7372691651}    Recommended Labs or Other Treatments After Discharge: ***    Physician Certification: I certify the above information and transfer of Tammy Pineda  is necessary for the continuing treatment of the diagnosis listed and that he requires {Admit to Appropriate Level of Care:27530} for {GREATER/LESS:110796256} 30 days.      Update Admission H&P: {CHP DME Changes in [facility]:213911030}    PHYSICIAN SIGNATURE: {[facility]:447264529}

## 2022-01-26 NOTE — PLAN OF CARE
Problem: Gas Exchange - Impaired  Goal: Absence of hypoxia  Outcome: Ongoing  Note: O2 stable 90% on 6 liters HF. Pt continues to desat to 70s with ambulation while still on the HF. Pt encourage to use urinal at bedside. Problem: Falls - Risk of:  Goal: Will remain free from falls  Description: Will remain free from falls  Outcome: Ongoing  Note: Patient is a fall risk. See Fall Risk assessment for details. Bed is in low, lock position; call light/belongings within reach. No attempts to get out of bed have been made, calls appropriately when assistance is needed. Bed alarm and hourly rounds in place for safety. Will continue to monitor and reassess throughout shift.

## 2022-01-26 NOTE — PROGRESS NOTES
Progress Note    Admit Date: 1/24/2022  Day: 2  Diet: ADULT DIET; Regular; Low Sodium (2 gm)    CC: covid hypoxia    Interval history: NAEO, O2 requirement increased to 10L ovn, down to 7L now. No complaints at this time. HPI: 69-year-old  Male w a PMHX of COVID PNA 1/22/2022, T2DM who came in with hypoxia, diarrhea, and a positive home COVID test on his last admission and was discharged 1/22/2022 with 2L NC. Patient came back today (1/24/2022) due to persistent oxygenation of 88 to 90 % on 2 L with increased O2 when sleeping to 4 L NC. Patient was concerned and said he would feel better if he was in the hospital. Patient had no symptoms, just concerned about his O2 sats.  His symptom improved significantly.      ED Course:  Patient was sitting on the chair bedside with no complaints and not feeling any SOB. Patient was on 4L NC saturating at 89%. Patient PE was negative for any acute defects and his lungs sounded clear bilaterally. Patient labs were unremarkable and the CXR looks unchanged since discharged.      Medications:     Scheduled Meds:   furosemide  20 mg Oral Once    baricitinib  4 mg Oral Daily    pantoprazole  40 mg Oral QAM AC    sodium chloride flush  5-40 mL IntraVENous 2 times per day    enoxaparin  40 mg SubCUTAneous Daily    dexamethasone  6 mg Oral Daily    pravastatin  40 mg Oral Nightly    valsartan-hydroCHLOROthiazide  1 tablet Oral Daily    insulin lispro  0-6 Units SubCUTAneous TID WC    insulin lispro  0-3 Units SubCUTAneous Nightly     Continuous Infusions:   sodium chloride      dextrose       PRN Meds:sodium chloride flush, sodium chloride, ondansetron **OR** ondansetron, polyethylene glycol, acetaminophen **OR** acetaminophen, glucose, glucagon (rDNA), dextrose, dextrose bolus (hypoglycemia) **OR** dextrose bolus (hypoglycemia)    Objective:   Vitals:   T-max:  Patient Vitals for the past 8 hrs:   BP Temp Temp src Pulse Resp SpO2   01/26/22 0947 129/70 97.8 °F (36.6 °C) Oral 81 19 91 %   01/26/22 0621 -- -- -- -- -- 91 %   01/26/22 0520 -- -- -- -- 20 92 %   01/26/22 0422 131/85 98.7 °F (37.1 °C) Oral 80 20 92 %       Intake/Output Summary (Last 24 hours) at 1/26/2022 1144  Last data filed at 1/26/2022 0947  Gross per 24 hour   Intake --   Output 250 ml   Net -250 ml       Review of Systems   Constitutional: Negative for chills and fever. Respiratory: Negative for cough, chest tightness and shortness of breath. Cardiovascular: Negative for chest pain, palpitations and leg swelling. Gastrointestinal: Positive for constipation and vomiting. Negative for abdominal pain, diarrhea and nausea. Genitourinary: Negative for dysuria. Musculoskeletal: Negative. Skin: Negative. Allergic/Immunologic: Negative. Neurological: Negative. Hematological: Negative. Psychiatric/Behavioral: Negative. Vitals:    01/26/22 0422 01/26/22 0520 01/26/22 0621 01/26/22 0947   BP: 131/85   129/70   Pulse: 80   81   Resp: 20 20  19   Temp: 98.7 °F (37.1 °C)   97.8 °F (36.6 °C)   TempSrc: Oral   Oral   SpO2: 92% 92% 91% 91%         Physical Exam  Constitutional:       General: He is not in acute distress. Appearance: Normal appearance. HENT:      Head: Normocephalic and atraumatic. Right Ear: External ear normal.      Left Ear: External ear normal.      Nose: Nose normal.      Mouth/Throat:      Pharynx: Oropharynx is clear. Eyes:      General: No scleral icterus. Cardiovascular:      Rate and Rhythm: Normal rate and regular rhythm. Pulses: Normal pulses. Heart sounds: No murmur heard. Pulmonary:      Effort: Pulmonary effort is normal. No respiratory distress. Comments: On 7L NC, appears comfortable  Abdominal:      General: Abdomen is flat. Palpations: Abdomen is soft. Tenderness: There is no abdominal tenderness. Musculoskeletal:      Right lower leg: No edema. Left lower leg: No edema. Skin:     General: Skin is warm and dry. was 6.5 in 4/2021  - LDSSI  - Hypoglycemia protocol and POC glucose checks      HTN  - home valsartan-HCTZ      HLD  - Continue home pravastatin     Will discuss with attending physician Vineet Vogel     Code Status:Full code  FEN: Adult diet            PPX: Lovenox, Protonix   DISPO: David Minaya MD, PGY-1  01/26/22  11:44 AM    This patient has been staffed and discussed with Scharlene Holstein, MD.     Addendum to Resident H& P/Progress note:  I have personally seen,examined and evaluated the patient.  I have reviewed the current history, physical findings, labs and assessment and plan and agree with note as documented by resident MD ( Chelo Fowler)  The patient was evaluated by Dr. Fabiola Salinas, pulmonology; recommendations were reviewed and were appreciated    Scharlene Holstein, MD, FACP

## 2022-01-26 NOTE — PROGRESS NOTES
Pt c/o dyspnea, O2 in 80s on 6 lpm High flow. Pt repositioned up to recliner, O2 increased to 8 lpm. O2 90%. Will continue to monitor.

## 2022-01-26 NOTE — CONSULTS
Pulmonary Consult Note      Reason for Consult: Need for additional imaging and recomendations  Requesting Physician: Dr. Henry An     Subjective:   No acute events overnight. Patient seen and examined at been side. Patient's chart and notes were reviewed. Currently on 7 L NC SpO2 >95%. Patient reports SOB had Improved. Patient denies chest pain, chills, nausea, vomiting He denies urinary frequency, dysuria. Patient is hemodynamically stable and afebrile. CHIEF COMPLAINT / HPI:                Chaim Ayon is a 74 y.o. male with PMH as below notable for COVID PNA, HTN, T2DM who came in with hypoxia, diarrhea, and a positive home COVID test on his last admission and was discharged 1/22/2022 with 2L NC. who presented with increased oxygen requirements. Patient oxygen requirements went up to 4 L while sleeping. Patient denies any other symptoms, she was just concerned about his O2 sats around 89%. He reported previous cold symptoms improved. Patient presented 1/24 and during her hospitalization her oxygen requirement had been increasing up to 10 L right now weaning down on 7 L. CXR showed patchy multifocal consolidation similar to 1/20/2022. Pulmonology service was consulted for further workup and management of hypoxia 2/2 Covid PNA      Past Medical History:      Diagnosis Date    Diabetes mellitus ([facility] [address]     Gastroesophageal reflux disease without esophagitis     Hypertension     Rash       Past Surgical History:    History reviewed. No pertinent surgical history.   Current Medications:     baricitinib  4 mg Oral Daily    pantoprazole  40 mg Oral QAM AC    sodium chloride flush  5-40 mL IntraVENous 2 times per day    enoxaparin  40 mg SubCUTAneous Daily    dexamethasone  6 mg Oral Daily    pravastatin  40 mg Oral Nightly    valsartan-hydroCHLOROthiazide  1 tablet Oral Daily    insulin lispro  0-6 Units SubCUTAneous TID     insulin lispro  0-3 Units SubCUTAneous Nightly     Allergies: deficit present. Mental Status: He is alert and oriented to person, place, and time. Mental status is at baseline. Psychiatric:         Mood and Affect: Mood normal.         Behavior: Behavior normal.         DATA:    Old records have been reviewed  CBC with Differential:    Lab Results   Component Value Date    WBC 8.1 01/26/2022    RBC 5.29 01/26/2022    HGB 16.5 01/26/2022    HCT 47.6 01/26/2022     01/26/2022    MCV 89.9 01/26/2022    MCH 31.1 01/26/2022    MCHC 34.6 01/26/2022    RDW 13.6 01/26/2022    LYMPHOPCT 15.6 01/26/2022    MONOPCT 11.0 01/26/2022    BASOPCT 0.0 01/26/2022    MONOSABS 0.9 01/26/2022    LYMPHSABS 1.3 01/26/2022    EOSABS 0.0 01/26/2022    BASOSABS 0.0 01/26/2022     BMP:    Lab Results   Component Value Date     01/26/2022    K 3.8 01/26/2022    K 4.1 01/24/2022    CL 99 01/26/2022    CO2 28 01/26/2022    BUN 31 01/26/2022    CREATININE 0.8 01/26/2022    CALCIUM 8.8 01/26/2022    GFRAA >60 01/26/2022    LABGLOM >60 01/26/2022    GLUCOSE 120 01/26/2022     Hepatic Function Panel:    Lab Results   Component Value Date    ALKPHOS 50 01/26/2022    ALT 67 01/26/2022    AST 33 01/26/2022    PROT 7.0 01/26/2022    BILITOT 0.7 01/26/2022    BILIDIR 0.3 01/24/2022    IBILI 0.3 01/24/2022     ABG:  No results found for: OKZ7ALG, BEART, U3AECYYD, PHART, THGBART, OAS9PKM, PO2ART, EJW4TDG      Radiology Review:  All pertinent images / reports were reviewed as a part of this visit. Imaging reveals the following:  XR CHEST PORTABLE   Final Result      Patchy multifocal consolidation similar to 1/20/2022. Elevated right hemidiaphragm. Stable borderline cardiomegaly.       CT CHEST WO CONTRAST    (Results Pending)      Other diagnostic test:      PFTs:Not record on EMR    Echo:Not record on EMR    Doppler Studies:Not record on EMR    Assessment/Plan   Tammy Pineda is a 74 y.o. male with PMH as below notable for COVID PNA, HTN, T2DM who came in with hypoxia, diarrhea, and a positive home COVID test on his last admission and was discharged 1/22/2022 with 2L NC. who presented with increased oxygen requirements. Pulmonology service was consulted for further workup and management of hypoxia 2/2 Covid PNA    Acute hypoxic respiratory failure 2/2 COVID PNA  Presented with shortness of breath. 4 L of O2 in the ED admission, now on 7 L SPO2 greater than 94%. COVID positive 1/20. CXR /CT Chest showed no change in bilateral infiltrates. Ferritin decreasing 1440>>29151. D-dimer <200. CRP 82.6 increased. Resp panel and cultures negative.   -Isolation through  2/9  -Continue oxygen support wean off as possible, goal SPO2 greater than 88%. -Continue Decadron 6 mg daily for 10 days (Day 3/10)  -Continue Baricitinib (2/10)  -Follow-up inflammatory markers     Thank you for the opportunity to participate in [address] [place] [address]. Plan and assessment discussed with attending, Dr. Marychuy Landry. Jairo Hayes MD, PGY1  1/26/2022  10:48 AM    Patient was seen, examined and discussed with Dr. Renu Fleming. I agree with the history of present illness, past medical/surgical histories, family history, social history, medication list and allergies as listed. The review of systems is as noted above. My physical exam confirms the findings listed  Chart was reviewed including labs, CXR, CT scan, EKG, and medical records confirm the findings noted     Acute hypoxic respiratory failure, now requiring 7 liters  COVID19 pneumonia. Symptom 1st started on 1/15. Ferritin is trending down.   D dimer is not high, however CRP is up trending     Continue decadron and baricitinib  lovenox prophylaxis   CT scan reviewed and consistent with COVID19 pneumonia

## 2022-01-26 NOTE — PROGRESS NOTES
Patient vitals assessed, aided in using urinal, patient taken via transport to test. Will finish assessment and meds when he returns.

## 2022-01-26 NOTE — CARE COORDINATION
CM following. From home. Previous admission set [facility] with [facility] and [facility]. Now requiring 7l O2. Will continue to follow for possible needs at [place].

## 2022-01-27 VITALS
RESPIRATION RATE: 18 BRPM | OXYGEN SATURATION: 91 % | HEART RATE: 89 BPM | SYSTOLIC BLOOD PRESSURE: 119 MMHG | DIASTOLIC BLOOD PRESSURE: 63 MMHG | TEMPERATURE: 98.3 F

## 2022-01-27 LAB
A/G RATIO: 0.8 (ref 1.1–2.2)
ALBUMIN SERPL-MCNC: 3.2 G/DL (ref 3.4–5)
ALP BLD-CCNC: 53 U/L (ref 40–129)
ALT SERPL-CCNC: 62 U/L (ref 10–40)
ANION GAP SERPL CALCULATED.3IONS-SCNC: 9 MMOL/L (ref 3–16)
AST SERPL-CCNC: 30 U/L (ref 15–37)
BASOPHILS ABSOLUTE: 0 K/UL (ref 0–0.2)
BASOPHILS RELATIVE PERCENT: 0.1 %
BILIRUB SERPL-MCNC: 0.9 MG/DL (ref 0–1)
BUN BLDV-MCNC: 35 MG/DL (ref 7–20)
CALCIUM SERPL-MCNC: 9 MG/DL (ref 8.3–10.6)
CHLORIDE BLD-SCNC: 97 MMOL/L (ref 99–110)
CO2: 30 MMOL/L (ref 21–32)
CREAT SERPL-MCNC: 0.9 MG/DL (ref 0.8–1.3)
EOSINOPHILS ABSOLUTE: 0 K/UL (ref 0–0.6)
EOSINOPHILS RELATIVE PERCENT: 0.4 %
GFR AFRICAN AMERICAN: >60
GFR NON-AFRICAN AMERICAN: >60
GLUCOSE BLD-MCNC: 117 MG/DL (ref 70–99)
GLUCOSE BLD-MCNC: 120 MG/DL (ref 70–99)
GLUCOSE BLD-MCNC: 165 MG/DL (ref 70–99)
HCT VFR BLD CALC: 48.8 % (ref 40.5–52.5)
HEMOGLOBIN: 16.7 G/DL (ref 13.5–17.5)
LYMPHOCYTES ABSOLUTE: 1.4 K/UL (ref 1–5.1)
LYMPHOCYTES RELATIVE PERCENT: 12.7 %
MAGNESIUM: 2.1 MG/DL (ref 1.8–2.4)
MCH RBC QN AUTO: 30.7 PG (ref 26–34)
MCHC RBC AUTO-ENTMCNC: 34.2 G/DL (ref 31–36)
MCV RBC AUTO: 89.9 FL (ref 80–100)
MONOCYTES ABSOLUTE: 1.1 K/UL (ref 0–1.3)
MONOCYTES RELATIVE PERCENT: 10 %
NEUTROPHILS ABSOLUTE: 8.6 K/UL (ref 1.7–7.7)
NEUTROPHILS RELATIVE PERCENT: 76.8 %
PDW BLD-RTO: 13.3 % (ref 12.4–15.4)
PERFORMED ON: ABNORMAL
PERFORMED ON: ABNORMAL
PLATELET # BLD: 429 K/UL (ref 135–450)
PMV BLD AUTO: 6.9 FL (ref 5–10.5)
POTASSIUM SERPL-SCNC: 3.9 MMOL/L (ref 3.5–5.1)
RBC # BLD: 5.43 M/UL (ref 4.2–5.9)
SODIUM BLD-SCNC: 136 MMOL/L (ref 136–145)
TOTAL PROTEIN: 7.2 G/DL (ref 6.4–8.2)
WBC # BLD: 11.2 K/UL (ref 4–11)

## 2022-01-27 PROCEDURE — XW0DXM6 INTRODUCTION OF BARICITINIB INTO MOUTH AND PHARYNX, EXTERNAL APPROACH, NEW TECHNOLOGY GROUP 6: ICD-10-PCS | Performed by: HOSPITALIST

## 2022-01-27 PROCEDURE — 99238 HOSP IP/OBS DSCHRG MGMT 30/<: CPT | Performed by: HOSPITALIST

## 2022-01-27 PROCEDURE — 2700000000 HC OXYGEN THERAPY PER DAY

## 2022-01-27 PROCEDURE — 99232 SBSQ HOSP IP/OBS MODERATE 35: CPT | Performed by: INTERNAL MEDICINE

## 2022-01-27 PROCEDURE — 83735 ASSAY OF MAGNESIUM: CPT

## 2022-01-27 PROCEDURE — 6360000002 HC RX W HCPCS: Performed by: STUDENT IN AN ORGANIZED HEALTH CARE EDUCATION/TRAINING PROGRAM

## 2022-01-27 PROCEDURE — 94761 N-INVAS EAR/PLS OXIMETRY MLT: CPT

## 2022-01-27 PROCEDURE — 94680 O2 UPTK RST&XERS DIR SIMPLE: CPT

## 2022-01-27 PROCEDURE — 85025 COMPLETE CBC W/AUTO DIFF WBC: CPT

## 2022-01-27 PROCEDURE — 6370000000 HC RX 637 (ALT 250 FOR IP): Performed by: STUDENT IN AN ORGANIZED HEALTH CARE EDUCATION/TRAINING PROGRAM

## 2022-01-27 PROCEDURE — 80053 COMPREHEN METABOLIC PANEL: CPT

## 2022-01-27 PROCEDURE — 36415 COLL VENOUS BLD VENIPUNCTURE: CPT

## 2022-01-27 PROCEDURE — 6360000002 HC RX W HCPCS

## 2022-01-27 PROCEDURE — 2580000003 HC RX 258

## 2022-01-27 PROCEDURE — 6370000000 HC RX 637 (ALT 250 FOR IP)

## 2022-01-27 RX ORDER — DEXAMETHASONE 6 MG/1
6 TABLET ORAL DAILY
Qty: 2 TABLET | Refills: 0 | Status: SHIPPED | OUTPATIENT
Start: 2022-01-27 | End: 2022-01-29

## 2022-01-27 RX ADMIN — DEXAMETHASONE 6 MG: 4 TABLET ORAL at 08:26

## 2022-01-27 RX ADMIN — BARICITINIB 4 MG: 2 TABLET, FILM COATED ORAL at 09:22

## 2022-01-27 RX ADMIN — VALSARTAN AND HYDROCHLOROTHIAZIDE 1 TABLET: 160; 12.5 TABLET, FILM COATED ORAL at 08:26

## 2022-01-27 RX ADMIN — INSULIN LISPRO 1 UNITS: 100 INJECTION, SOLUTION INTRAVENOUS; SUBCUTANEOUS at 12:23

## 2022-01-27 RX ADMIN — ENOXAPARIN SODIUM 40 MG: 100 INJECTION SUBCUTANEOUS at 08:26

## 2022-01-27 RX ADMIN — PANTOPRAZOLE SODIUM 40 MG: 40 TABLET, DELAYED RELEASE ORAL at 06:24

## 2022-01-27 RX ADMIN — SODIUM CHLORIDE, PRESERVATIVE FREE 10 ML: 5 INJECTION INTRAVENOUS at 08:27

## 2022-01-27 ASSESSMENT — PAIN SCALES - GENERAL
PAINLEVEL_OUTOF10: 0
PAINLEVEL_OUTOF10: 0

## 2022-01-27 NOTE — PROGRESS NOTES
01/27/22 1301   Resting (On O2)   SpO2 94   HR 98   O2 Device Other (comment)  (High flow cannula)   O2 Flow Rate (l/min) 6 l/min   FIO2 (%) 96   During Walk (On O2)   SpO2 85   HR 98   O2 Device Other (comment)   O2 Flow Rate (l/min) 8 l/min   Need Additional O2 Flow Rate Rows Yes   O2 Flow Rate (l/min) 8 l/min   O2 Saturation 90   Walk/Assistance Device Ambulation   Rate of Dyspnea 2   After Walk   SpO2 94      O2 Flow Rate (l/min) 8 l/min   Rate of Dyspnea 2   Comments High flow cannula   Does the Patient Qualify for Home O2 Yes   Liter Flow at Rest 6   Liter Flow on Exertion 8   Does the Patient Need Portable Oxygen Tanks Yes

## 2022-01-27 NOTE — DISCHARGE SUMMARY
INTERNAL MEDICINE DEPARTMENT AT [address] [place] [address]  DISCHARGE SUMMARY    Patient ID: Camelia Srinivasan                                             Discharge Date: 1/27/2022   Patient's PCP: Maria Teresa Saeed MD                                          Discharge Physician: Lula Burgos MD   Admit Date: 1/24/2022   Admitting Physician: Mai Lorenzo MD    PROBLEMS DURING HOSPITALIZATION:  Present on Admission:   Bilateral pneumonia secondary to COVID 19 pneumonia  Hypoxia        Diagnosis Date    Diabetes mellitus ([facility] [address]     Gastroesophageal reflux disease without esophagitis     Hypertension     Rash        DISCHARGE DIAGNOSES:  Present on Admission:   COVID        Diagnosis Date    Diabetes mellitus ([facility] [address]     Gastroesophageal reflux disease without esophagitis     Hypertension     Rash        HPI:  49-year-old  Male w a PMHX of COVID PNA 1/22/2022, T2DM who came in with hypoxia, diarrhea, and a positive home COVID test on his last admission and was discharged 1/22/2022 with 2L NC. Patient came back today (1/24/2022) due to persistent oxygenation of 88 to 90 % on 2 L with increased O2 when sleeping to 4 L NC. Patient was admitted from ED and placed on nasal cannula between 4 to 10 L during his stay. He was treated with a course of Decadron and baricitinib. Pulmonology was consulted and a CT chest was ordered that was consistent with Covid pneumonia. Patient was evaluated for increased O2 need at home and was discharged with a condenser that could meet his oxygen requirement. On the date of discharge, the patient reported feeling stable. The patient was found to not be in any acute distress, with vital signs within normal limits, and no new abnormalities on physical examination. Further, the patient expressed appropriate understanding of, and agreement with, the discharge recommendations, medications, and plan.     The following issues were addressed during hospitalization:  COVID PNA with increased O2 capsule  Commonly known as: PRILOSEC  Take 1 capsule by mouth every morning (before breakfast)     pravastatin 40 MG tablet  Commonly known as: PRAVACHOL     valsartan-hydroCHLOROthiazide 160-12.5 MG per tablet  Commonly known as: DIOVAN-HCT  Take 1 tablet by mouth daily           Where to Get Your Medications      These medications were sent to [facility], [address] [place] [address] [phone]  [address] [place] [address]    Phone: [phone]   · baricitinib 2 MG Tabs tablet       Activity: activity as tolerated  Diet: regular diet  Wound Care: none needed    Time Spent on discharge is more than 30 minutes    Signed:  Viktoriya Pena MD  1/27/2022     Addendum to Resident H& P/Progress note:  I have personally seen,examined and evaluated the patient.  I have reviewed the current history, physical findings, labs and assessment and plan and agree with note as documented by resident MD ( Zita Coto)      Oanh Dillard MD, [address]

## 2022-01-27 NOTE — PROGRESS NOTES
Discussed discharge instructions with wife over phone. She verbalized understanding and will bring O2 tank when picking pt up.

## 2022-01-27 NOTE — PROGRESS NOTES
Pulmonary Followup Note    Indication for visit:  Increased oxygen requirements    Subjective:  No acute events overnight. Patient seen and examined at [facility] side. Patient's chart and notes were reviewed. Currently on NC 8 L LiU638%. Patient states he feels great. SOB had decreased. Patient denies chest pain, chills, nausea, vomiting He denies urinary frequency, dysuria. Patient is hemodynamically stable and afebrile.  baricitinib  4 mg Oral Daily    pantoprazole  40 mg Oral QAM AC    sodium chloride flush  5-40 mL IntraVENous 2 times per day    enoxaparin  40 mg SubCUTAneous Daily    dexamethasone  6 mg Oral Daily    pravastatin  40 mg Oral Nightly    valsartan-hydroCHLOROthiazide  1 tablet Oral Daily    insulin lispro  0-6 Units SubCUTAneous TID WC    insulin lispro  0-3 Units SubCUTAneous Nightly       Continuous Infusions:   sodium chloride      dextrose         Review of Systems  A 10 point review of systems was conducted, significant findings as noted in HPI. PHYSICAL EXAMINATION:  /71   Pulse 91   Temp 98.7 °F (37.1 °C) (Oral)   Resp 20   SpO2 95%     Physical Exam  Constitutional:       Appearance: He is obese. HENT:      Head: Normocephalic. Mouth/Throat:      Mouth: Mucous membranes are moist.   Eyes:      Extraocular Movements: Extraocular movements intact. Conjunctiva/sclera: Conjunctivae normal.      Pupils: Pupils are equal, round, and reactive to light. Cardiovascular:      Rate and Rhythm: Normal rate and regular rhythm. Pulses: Normal pulses. Heart sounds: Normal heart sounds. Pulmonary:      Effort: Pulmonary effort is normal.      Breath sounds: Examination of the right-middle field reveals rhonchi. Examination of the left-middle field reveals rhonchi. Examination of the right-lower field reveals rhonchi. Examination of the left-lower field reveals rhonchi. Rhonchi present.       Comments: Mildly decreased breath sounds throughout greatest at the LLL with mild crackles. Slight rhonchi present on the R lung  Abdominal:      General: Abdomen is flat. Bowel sounds are normal.      Palpations: Abdomen is soft. Musculoskeletal:         General: Normal range of motion. Cervical back: Normal range of motion. Neurological:      General: No focal deficit present. Mental Status: He is alert and oriented to person, place, and time. Mental status is at baseline. Psychiatric:         Mood and Affect: Mood normal.         Behavior: Behavior normal.    DATA  CBC:   Recent Labs     01/26/22  0826 01/27/22  0734   WBC 8.1 11.2*   HGB 16.5 16.7   HCT 47.6 48.8   MCV 89.9 89.9    429     BMP:   Recent Labs     01/26/22  0826 01/27/22  0734    136   K 3.8 3.9   CL 99 97*   CO2 28 30   BUN 31* 35*   CREATININE 0.8 0.9     No results for input(s): PHART, VNK4DEB, PO2ART in the last 72 hours. LIVER PROFILE:   Recent Labs     01/24/22  1806 01/26/22  0826 01/27/22  0734   AST 60* 33 30   ALT 95* 67* 62*   BILIDIR 0.3  --   --    BILITOT 0.6 0.7 0.9   ALKPHOS 53 50 53       Radiology Review:  Pertinent images / reports were reviewed as a part of this visit. Imaging reveals the following:    CT CHEST WO CONTRAST   Final Result      1. Large geographic areas of groundglass attenuation compatible with bilateral pneumonia. Imaging characteristics most consistent with Covid 19 pneumonia. 2. Small hiatal hernia. 3. Coronary artery calcification. 4. Reactive mediastinal lymph nodes. XR CHEST PORTABLE   Final Result      Patchy multifocal consolidation similar to 1/20/2022. Elevated right hemidiaphragm. Stable borderline cardiomegaly.             ASSESSMENT/PLAN:  Rosa Veloz is a 74 y.o. male with PMH as below notable for COVID PNA, HTN, T2DM who came in with hypoxia, diarrhea, and a positive home COVID test on his last admission and was discharged 1/22/2022 with 2L NC. who presented with increased oxygen requirements. Pulmonology service was consulted for further workup and management of hypoxia 2/2 Covid PNA     Acute hypoxic respiratory failure 2/2 COVID PNA  Presented with shortness of breath.  4 L of O2 in the ED admission, now on 7 L SPO2 greater than 94%. COVID positive 1/20. CXR /CT Chest showed no change in bilateral infiltrates.  Ferritin decreasing 1440>>68813.    D-dimer <200.  CRP 82.6 increased. Resp panel and cultures negative.   -Isolation through  2/9  -Continue oxygen support wean off as possible, goal SPO2 greater than 88%. - Decadron 6 mg daily(Day 4)  -Baricitinib (Day 3)  -Patient under acceptable conditions to be discharged with home oxygen. Thank you for the opportunity to participate in [address] [place] [address]. Patient has been staffed and plan discussed with Dr. Marie Miranda MD, PGY1  1/27/2022  10:55 AM    Acute hypoxic respiratory failure, now on 6 liters  COVID19 pneumonia.       Continue decadron and baricitinib  Discussed with primary team.  OK to d/c with 8L O2 concentrator. F/u with pulmonary in 4 weeks to reassess home O2 requirements   I will sign off.   Please call with any questions or concerns

## 2022-01-27 NOTE — CARE COORDINATION
CM following for  D/c planning and  Needs  ;     From home. Re Admit: Previous admission set up with [facility] and [facility]. Now requiring 7l O2. Will continue to follow for possible needs at [place]. CM spoke with Medical resident who  States they are  Being followed  By [facility] and  May be  Medically ready for  D/c  Today /tomororw :  Placed  Home O 2 eval ,  And  CM will follow up on result  To see if  That is  something they can accommodate at home . Electronically signed by Isaac Bustos RN on 1/27/2022 at 10:09 AM        Isaac Bustos RN Case Manager  [facility]  [address] [place] [address].   [facility] [address]  [phone]  Fax [phone]

## 2022-01-27 NOTE — PROGRESS NOTES
Discharge order received. Patient informed of discharge order. Discharge instructions reviewed with patient. Copy of discharge instructions given to patient. Prescriptions e-scribed to [address]. Patient verbalized understanding, denies needs or questions at this time. IV and telemetry removed. All patient belongings packed and sent with patient upon discharge. Patient left in wheelchair.

## 2022-01-27 NOTE — CARE COORDINATION
Resumption of Care    Patient currently active with [facility] prior to admission.  Orders faxed to [facility] for Resumption of Care by 1/29  Electronically signed by Ghada Pina LPN on 6/32/7171 at 04:67 PM

## 2022-01-27 NOTE — CARE COORDINATION
Case Management Assessment            Discharge Note                    Date / Time of Note: 1/27/2022 1:13 PM                  Discharge Note Completed by: Payam Bill RN    Patient Name: Camelia Srinivasan   YOB: 1955  Diagnosis: COVID [U07.1]  Pneumonia due to COVID-19 virus [U07.1, J12.82]   Date / Time: 1/24/2022  8:36 AM    Current PCP: Maria Teresa Saeed MD  Clinic patient: No    Hospitalization in the last 30 days: Yes    Advance Directives:  Code Status: Full Code  [place] DNR form completed and on chart: No    Financial:  Payor: MEDICARE / Plan: MEDICARE PART A AND B / Product Type: *No Product type* /      Pharmacy:    [facility] [place], [address] [place] [address]  [address] [place] [address] [address]  Phone: [phone] Fax: [phone]      Assistance purchasing medications?:    Assistance provided by Case Management: None at this time    Does patient want to participate in local refill/ meds to beds program?:      Meds To Beds General Rules:  1. Can ONLY be done Monday- Friday between 8:30am-5pm  2. Prescription(s) must be in pharmacy by 3pm to be filled same day  3. Copy of patient's insurance/ prescription drug card and patient face sheet must be sent along with the prescription(s)  4. Cost of Rx cannot be added to hospital bill. If financial assistance is needed, please contact unit  or ;  or  CANNOT provide pharmacy voucher for patients co-pays  5.  Patients can then  the prescription on their way out of the hospital at discharge, or pharmacy can deliver to the bedside if staff is available. (payment due at time of pick-up or delivery - cash, check, or card accepted)     Able to afford home medications/ co-pay costs: Yes    ADLS:  Current PT AM-PAC Score:   /24  Current OT AM-PAC Score:   /24      DISCHARGE Disposition: Home with [address]: SN       LOC at discharge: [facility] Completed: No    Notification completed in HENS/PAS?:  Not Applicable    IMM Completed:   Not Indicated    Transportation:  Transportation PLAN for discharge: family   Mode of Transport: Private Car  Reason for medical transport: Not Applicable  Name of Transport Company: Not Applicable  Time of Transport: when family     Transport form completed: No    Home Care:  [address] ordered at discharge: No  [address]: Not Applicable  Orders faxed: Yes    Durable Medical Equipment:  DME Provider: Tyler [facility] [place] obtained during hospitalization: O2     Home Oxygen and Respiratory Equipment:  Oxygen needed at discharge?: Yes  [address]: [facility]  Phone: [phone]   Portable tank available for discharge?: Yes    Dialysis:  Dialysis patient: No    Dialysis Center:  Not Applicable    Hospice Services:  Location: Not Applicable  Agency: Not Applicable    Consents signed: Not Indicated    Referrals made at [facility] for outpatient continued care:  Not Applicable    Additional CM Notes:     CM confirmed  D/C to Home  . Active  W/ [employer]  Prior to admission , Home O2 requirement increased  :  CM spoke dulce Steven w/ [facility] [phone]. Who states  She  will have  A new  Concentrator delivered to his  Home this evening , :  And  patient will need to bring one of his own portable tanks from home for transport:    CM   D/W  RN John Wu: and  CM  D/w  Pt via telephone d/t  Covid Isolation restrictions .        New RX:  E scribed to her own pharmacy       these medications from [facility] [place], [address] [place] [address] baricitinib  2 Dexamethasone    Follow up out pt  As  Instructed        The Plan for Transition of Care is related to the following treatment goals of COVID [U07.1]  Pneumonia due to COVID-19 virus [U07.1, J12.82]    The Patient and/or patient representative Jazmin Hagan and his family were provided with a choice of provider and agrees with the discharge plan Yes    Freedom of choice list was provided with basic dialogue that supports the patient's individualized plan of care/goals and shares the quality data associated with the providers.  Yes    Care Transitions patient: No    Russell Stock RN  [facility]  Case Management Department  Ph: [phone]

## 2022-01-28 ENCOUNTER — TELEPHONE (OUTPATIENT)
Dept: INTERNAL MEDICINE CLINIC | Age: 67
End: 2022-01-28

## 2022-01-28 ENCOUNTER — CARE COORDINATION (OUTPATIENT)
Dept: CASE MANAGEMENT | Age: 67
End: 2022-01-28

## 2022-01-28 NOTE — TELEPHONE ENCOUNTER
----- Message from [employer] sent at 1/28/2022  8:40 AM EST -----  Subject: Appointment Request    Reason for Call: Urgent (Patient Request) Hospital Follow Up    QUESTIONS  Type of Appointment? Established Patient  Reason for appointment request? Available appointments did not meet   patient need  Additional Information for Provider? patients wife called he was   discharged yesterday with Covid Pneumonia needs to follow up as soon as   possible please call no appointments until 2/21  ---------------------------------------------------------------------------  --------------  CALL BACK INFO  What is the best way for the office to contact you? OK to leave message on   voicemail  Preferred Call Back Phone Number? [phone]  ---------------------------------------------------------------------------  --------------  SCRIPT ANSWERS  Relationship to Patient? Third Party  Representative Name? wife  (Patient requests to see provider urgently. )? Yes  (Has the patient been discharged from the hospital within 2 business days   AND does not have a Telephone Encounter  Follow Up From [address] [place] [address]   documented in [address]?)? No  Do you have any questions for your primary care provider that need to be   answered prior to your appointment? (Use RN Triage if question pertains to   anything on the red flag list)? No  (Patient needs follow up visit after hospital discharge) Book first   available appointment within 7 days OF DISCHARGE, if no appt, proceed to   book the next available time slot within 14 days OF DISCHARGE AND Send   Message to Provider. [address] Follow Up appointment cannot be booked   beyond 14 Days and should result in a Message to Provider. ? Yes   Have you been diagnosed with, awaiting test results for, or told that you   are suspected of having COVID-19 (Coronavirus)? (If patient has tested   negative or was tested as a requirement for work, school, or travel and   not based on symptoms, answer no)?  Yes  Did

## 2022-01-28 NOTE — CARE COORDINATION
[facility] Transitions Initial Follow Up Call    Call within 2 business days of discharge: Yes    Patient: Adi Krishnamurthy Patient : 1955   MRN: <[id number]>  Reason for Admission: Pneumonia due to COVID-19 virus   Discharge Date: 22 RARS: Readmission Risk Score: 11.5 ( )      Last Discharge [facility]       Complaint Diagnosis Description Type Department Provider    22 Positive For Covid-19; Shortness of Breath Pneumonia due to COVID-19 virus ED to Hosp-Admission (Discharged) (ADMITTED) [address] Serenity Calderón MD; Artie Lees MD           Spoke with: [facility] attempted outreach 24 hr hospital discharge for care transition follow up. Left HIPPA compliant message and contact information for call back.     Facility: [address] Transitions 24 Hour Call    Care Transitions Interventions         Follow Up  Future Appointments   Date Time Provider Carlos Velazquez   2022 12:00 PM MD Tyler Saavedra P/CC WILLA Pearson LPN

## 2022-01-28 NOTE — CARE COORDINATION
Transitions of Care Call  Call within 2 business days of discharge: Yes    Patient: Kyle Mclaughlin Patient : 1955   MRN: <[id number]>  Reason for Admission: Covid 19/PNA  Discharge Date: 22 RARS: Readmission Risk Score: 11.5 ( )    Spoke with patient's wife Candy(GOLDEN) who reports that patient is doing fine with the new O2 Rx which is 6 Liters at bedtime and 8 liters when up moving around. O2 sat at rest/sleeping was 98%. Wife report that appetite and fluid intake is good and denies any problems with bowel or bladder. Patient is taking medications as ordered. Wife states that patient did not receive the baricitinib and the pharmacy said that they did not get the order. Writer contacted the hospital and spoke to Mireille Hill the nurse who stated that they will resend that Rx to the pharmacy and give it about 30 to 45 mins and contact the pharmacy again. Writer relayed message to patient's wife. Wife reports that patient's PCP will be calling back with HFU appointment and fu with Dr Deisy Rebolledo is 2022. Denies any other needs at this time. Patient and wife instructed to continue to monitor s/s, reporting any that may present to MD immediately for early intervention. Reminded of COVID 19 precautions. Agreeable to f/u calls. Patient's wife called and stated that [facility] did not have the medication is stock until Monday. Writer contacted [facility] to see if they had it in stock and the pharmacist John Galeas did a search  to see what pharmacy may have the she then stated that she will need to give a call back to the wife and the wife give contact information. 2:41 pm  Wife call writer because they missed the call from the hospital concerning the medication baricitinib.  Writer contacted the the [address] and spoke with John Galeas who stated she turned the matter over to the clinical pharmacist. Writer contact Dominick Machuca the clinical pharmacist who states that Dr Sandra Carter is who left the message and he will ping the Dr to give writer a call back. 3:17 pm    Dr Jammie Ballard returned call to writer to clarify message left on patient's phone. Patient is to only take the Decadron. Writer contact wife and clarified the order and wife verbalized understanding. Last Discharge [facility]       Complaint Diagnosis Description Type Department Provider    1/24/22 Positive For Covid-19; Shortness of Breath Pneumonia due to COVID-19 virus ED to Hosp-Admission (Discharged) (ADMITTED) [facility] Joy Blake MD; Joy Fajardo MD          Was this an external facility discharge? No Discharge Facility: [facility] to be reviewed by the provider   Additional needs identified to be addressed with provider: Yes  medications-Writer contact hospital for Rx baricitinib to be resent to Pharm                  Encounter was not routed to provider for escalation. Method of communication with provider: phone. Discussed COVID-19 related testing which was: available at this time. Test results were: positive. Patient informed of results, if available? Yes. Current Symptoms: no new symptoms, no worsening symptoms and O2 6 liter HS and 8 liters while awake via nc    Reviewed New or Changed Meds: yes    Do you have what you need at home?  Durable Medical Equipment ordered at discharge: [facility]/Outpatient orders at discharge: home health care   Was patient discharged with a pulse oximeter? No Discussed and confirmed pulse oximeter discharge instructions and when to notify provider or seek emergency care. Patient education provided: Reviewed appropriate site of care based on symptoms and resources available to patient including: PCP, Specialist, Home health and When to call 911. Follow up appointment scheduled within 7 days of discharge: Wife waiting for PCP office to call back.  If no appointment scheduled, scheduling offered: no  Future Appointments   Date Time Provider Department [place]   2/11/2022 12:00 PM MD Reji Caraballo P/CC [facility]       Interventions: Obtained and reviewed discharge summary and/or continuity of care documents  Education of patient/family/caregiver/guardian to support self-management-Covid precautions and quarantine guidelines and when to contact the doctor  Assessment and support for treatment adherence and medication management-. Reviewed discharge instructions, medical action plan and red flags with Wife and patient  who verbalized understanding. Plan for follow-up call in 5-7 days based on severity of symptoms and risk factors. Plan for next call: symptom management-,  medication management-. Provided contact information for future needs.     Krystal Parra LPN

## 2022-02-03 ENCOUNTER — VIRTUAL VISIT (OUTPATIENT)
Dept: INTERNAL MEDICINE CLINIC | Age: 67
End: 2022-02-03
Payer: MEDICARE

## 2022-02-03 DIAGNOSIS — Z12.5 ENCOUNTER FOR SCREENING FOR MALIGNANT NEOPLASM OF PROSTATE: ICD-10-CM

## 2022-02-03 DIAGNOSIS — U07.1 PNEUMONIA DUE TO COVID-19 VIRUS: Primary | ICD-10-CM

## 2022-02-03 DIAGNOSIS — J12.82 PNEUMONIA DUE TO COVID-19 VIRUS: Primary | ICD-10-CM

## 2022-02-03 PROCEDURE — 3017F COLORECTAL CA SCREEN DOC REV: CPT | Performed by: INTERNAL MEDICINE

## 2022-02-03 PROCEDURE — 99213 OFFICE O/P EST LOW 20 MIN: CPT | Performed by: INTERNAL MEDICINE

## 2022-02-03 PROCEDURE — 4040F PNEUMOC VAC/ADMIN/RCVD: CPT | Performed by: INTERNAL MEDICINE

## 2022-02-03 PROCEDURE — 1111F DSCHRG MED/CURRENT MED MERGE: CPT | Performed by: INTERNAL MEDICINE

## 2022-02-03 PROCEDURE — 1123F ACP DISCUSS/DSCN MKR DOCD: CPT | Performed by: INTERNAL MEDICINE

## 2022-02-03 PROCEDURE — G8427 DOCREV CUR MEDS BY ELIG CLIN: HCPCS | Performed by: INTERNAL MEDICINE

## 2022-02-03 NOTE — PROGRESS NOTES
Dionne Ware (:  1955) is a Established patient, here for evaluation of the following:    Assessment & Plan   Below is the assessment and plan developed based on review of pertinent history, physical exam, labs, studies, and medications. 1. Pneumonia due to COVID-19 virus   -He continues to slowly improve. His oxygen saturation is adequate on the current level of oxygen, anticipate he will have a slow wean of the nasal cannula oxygen. He has close follow-up scheduled with pulmonology   -We will plan to follow-up with me in 3 months with routine blood work, can call in the interim with any concerns. Planning on being vaccinated once he has recovered further. 2. Encounter for screening for malignant neoplasm of prostate  -     PSA screening; Future    Return in about 3 months (around 5/3/2022). Subjective   HPI     He is doing better with the higher levels of oxygen, he is on 6 L at rest and 8 L when walking. He feels like he is slowly improving. He is using a spirometer several times a day. Sugars are sometimes a little bit elevated in the morning, he did complete a course of steroids. He has an upcoming appointment with the pulmonologist.    Monica vincent reviewed    Review of Systems       Objective   Patient-Reported Vitals  Patient-Reported Systolic (Top): 956 mmHg  Patient-Reported Diastolic (Bottom): 78 mmHg  BP Observations: Yes, BP was taken on electronic monitoring device with digital readout  Patient-Reported Pulse Oximetry: 95       Physical Exam  Constitutional:       General: He is not in acute distress. Appearance: Normal appearance. HENT:      Head: Normocephalic and atraumatic. Pulmonary:      Effort: Pulmonary effort is normal. No respiratory distress. Neurological:      General: No focal deficit present. Mental Status: He is alert. Psychiatric:         Mood and Affect: Mood normal.         Behavior: Behavior normal.         Thought Content:  Thought content normal.         Judgment: Judgment normal.                  Jessica Marking, was evaluated through a synchronous (real-time) audio-video encounter. The patient (or guardian if applicable) is aware that this is a billable service, which includes applicable co-pays. This Virtual Visit was conducted with patient's (and/or legal guardian's) consent. The visit was conducted pursuant to the emergency declaration under the [address] [place] [address] authority and the [employer] General Act. Patient identification was verified, and a caregiver was present when appropriate. The patient was located at home in a state where the provider was licensed to provide care.        --Mike Adrian MD

## 2022-02-04 ENCOUNTER — CARE COORDINATION (OUTPATIENT)
Dept: CASE MANAGEMENT | Age: 67
End: 2022-02-04

## 2022-02-04 ENCOUNTER — TELEPHONE (OUTPATIENT)
Dept: INTERNAL MEDICINE CLINIC | Age: 67
End: 2022-02-04

## 2022-02-07 ENCOUNTER — TELEPHONE (OUTPATIENT)
Dept: INTERNAL MEDICINE CLINIC | Age: 67
End: 2022-02-07

## 2022-02-07 NOTE — TELEPHONE ENCOUNTER
Jim called into the office to see if the patient could titrate down from his oxygen. Pt was at 99% with 6 L, the nurse brought him down to 5 L and he remained at 5L. Please advise.

## 2022-02-11 ENCOUNTER — CARE COORDINATION (OUTPATIENT)
Dept: CASE MANAGEMENT | Age: 67
End: 2022-02-11

## 2022-02-11 ENCOUNTER — OFFICE VISIT (OUTPATIENT)
Dept: PULMONOLOGY | Age: 67
End: 2022-02-11
Payer: MEDICARE

## 2022-02-11 VITALS
BODY MASS INDEX: 32.62 KG/M2 | HEART RATE: 88 BPM | OXYGEN SATURATION: 99 % | WEIGHT: 233 LBS | DIASTOLIC BLOOD PRESSURE: 89 MMHG | SYSTOLIC BLOOD PRESSURE: 156 MMHG | TEMPERATURE: 96.5 F | HEIGHT: 71 IN

## 2022-02-11 DIAGNOSIS — R60.0 BILATERAL LEG EDEMA: Primary | ICD-10-CM

## 2022-02-11 DIAGNOSIS — J12.82 PNEUMONIA DUE TO COVID-19 VIRUS: ICD-10-CM

## 2022-02-11 DIAGNOSIS — U07.1 PNEUMONIA DUE TO COVID-19 VIRUS: ICD-10-CM

## 2022-02-11 PROCEDURE — 1123F ACP DISCUSS/DSCN MKR DOCD: CPT | Performed by: INTERNAL MEDICINE

## 2022-02-11 PROCEDURE — 4040F PNEUMOC VAC/ADMIN/RCVD: CPT | Performed by: INTERNAL MEDICINE

## 2022-02-11 PROCEDURE — G8427 DOCREV CUR MEDS BY ELIG CLIN: HCPCS | Performed by: INTERNAL MEDICINE

## 2022-02-11 PROCEDURE — 1111F DSCHRG MED/CURRENT MED MERGE: CPT | Performed by: INTERNAL MEDICINE

## 2022-02-11 PROCEDURE — G8417 CALC BMI ABV UP PARAM F/U: HCPCS | Performed by: INTERNAL MEDICINE

## 2022-02-11 PROCEDURE — G8484 FLU IMMUNIZE NO ADMIN: HCPCS | Performed by: INTERNAL MEDICINE

## 2022-02-11 PROCEDURE — 3017F COLORECTAL CA SCREEN DOC REV: CPT | Performed by: INTERNAL MEDICINE

## 2022-02-11 PROCEDURE — 1036F TOBACCO NON-USER: CPT | Performed by: INTERNAL MEDICINE

## 2022-02-11 PROCEDURE — 99214 OFFICE O/P EST MOD 30 MIN: CPT | Performed by: INTERNAL MEDICINE

## 2022-02-11 NOTE — CARE COORDINATION
Tim 45 Transitions Follow Up Call    2022    Patient: Natasha Ha   Patient : 1955   MRN: [id number]   Reason for Admission:  covid 19 monitoring   Discharge Date: 22 RARS: Readmission Risk Score: 11.5 ( )         Spoke with: Govind Rehman 17 Transitions Subsequent and Final Call    Subsequent and Final Calls  Do you have any ongoing symptoms?: No  Have your medications changed?: No  Do you have any questions related to your medications?: No  Do you currently have any active services?: No  Do you have any needs or concerns that I can assist you with?: No  Identified Barriers: None  Care Transitions Interventions  Other Interventions:         SUMMARY  CTN spoke to the Pt who denies s/s r/t Covid 19. Pt denies issues with fluid intake, appetite, urination, and bowel habits. Pt confirms they have all meds and taking as prescribed and saw pulmonologist today who made no changes to his meds. Pt has also seen PCP since d/c home and [facility] continues to follow. From CDC: Are you at higher risk for severe illness?  Wash your hands often.  Avoid close contact (6 feet, which is about two arm lengths) with people who are sick.  Put distance between yourself and other people if COVID-19 is spreading in your community.  Clean and disinfect frequently touched surfaces.  Avoid all cruise travel and non-essential air travel.  Call your healthcare professional if you have concerns about COVID-19 and your underlying condition or if you are sick. Pt will take all meds as prescribed and schedule / keep doctor's appt. [facility] provided education on s/s that require medical attention and when to seek medical attention. [facility] advised Pt of use urgent care or physician's 24 hr access line if assistance is needed after hours or on the weekend. Pt denies any needs or concerns and is agreeable with additional calls.       Follow Up  Future Appointments   Date Time Provider Carlos Velazquez 3/10/2022  1:20 PM MD Reji Tolliver P/CC [facility]       Gulshan Putnam RN

## 2022-02-11 NOTE — PROGRESS NOTES
[facility] Pulmonary and Critical Care    Outpatient Note    Subjective:   CHIEF COMPLAINT:   Chief Complaint   Patient presents with    Follow-Up from Hospital       HPI:     The patient is 74 y.o. male who presents today for hospital follow up. He was admitted to the hospital on 1/24/22 with COVID19 pneumonia and discharged on 1/27/22  During this stay he required O2 between 4 to 10L via NC. He was treated with decadron and baricitinib. He was discharged on 6 liters. At this time there is no SOB. He is currently on 2 liters O2. Functional capacity is much better. Cough is also better. He did not lose taste and smell. Past Medical History:    Past Medical History:   Diagnosis Date    Diabetes mellitus ([address]     Gastroesophageal reflux disease without esophagitis     Hypertension     Rash        Social History:    Social History     Tobacco Use   Smoking Status Never Smoker   Smokeless Tobacco Never Used       Family History:  Family History   Problem Relation Age of Onset    Diabetes Mother     No Known Problems Father        Current Medications:  Current Outpatient Medications on File Prior to Visit   Medication Sig Dispense Refill    omeprazole (PRILOSEC) 20 MG delayed release capsule TAKE ONE CAPSULE BY MOUTH EVERY MORNING BEFORE BREAKFAST 90 capsule 1    valsartan-hydroCHLOROthiazide (DIOVAN-HCT) 160-12.5 MG per tablet Take 1 tablet by mouth daily 90 tablet 1    pravastatin (PRAVACHOL) 40 MG tablet Take 40 mg by mouth       metFORMIN (GLUCOPHAGE) 500 MG tablet Take 500 mg by mouth       No current facility-administered medications on file prior to visit. REVIEW OF SYSTEMS:    Review of Systems   Constitutional: Negative for chills, fatigue, fever and unexpected weight change. HENT: Negative for congestion. Respiratory: Negative for cough, chest tightness, shortness of breath and wheezing.     Cardiovascular: Negative for chest pain, palpitations and leg swelling. Neurological: Negative for weakness. Psychiatric/Behavioral: The patient is not nervous/anxious. All other systems reviewed and are negative. Objective:   PHYSICAL EXAM:        VITALS:  BP (!) 156/89 (Site: Left Upper Arm, Position: Sitting, Cuff Size: Medium Adult)   Pulse 88   Temp 96.5 °F (35.8 °C) (Infrared)   Ht 5' 11\" (1.803 m)   Wt 233 lb (105.7 kg)   SpO2 99% Comment: 2L  BMI 32.50 kg/m²     Physical Exam  Vitals reviewed. Constitutional:       Appearance: He is well-developed. HENT:      Head: Normocephalic and atraumatic. Eyes:      Pupils: Pupils are equal, round, and reactive to light. Neck:      Vascular: No JVD. Cardiovascular:      Rate and Rhythm: Normal rate and regular rhythm. Heart sounds: No murmur heard. No friction rub. No gallop. Pulmonary:      Effort: Pulmonary effort is normal. No respiratory distress. Breath sounds: Normal breath sounds. No stridor. No wheezing or rales. Abdominal:      General: Bowel sounds are normal.      Palpations: Abdomen is soft. Musculoskeletal:         General: No deformity. Cervical back: Neck supple. Skin:     General: Skin is warm and dry. Neurological:      Mental Status: He is alert and oriented to person, place, and time. Psychiatric:         Behavior: Behavior normal.         DATA:    CT chest on 1/26/22     1. Large geographic areas of groundglass attenuation compatible with bilateral pneumonia. Imaging characteristics most consistent with Covid 19 pneumonia. 2. Small hiatal hernia. 3. Coronary artery calcification. 4. Reactive mediastinal lymph nodes. CXR on 1/24/22  Patchy multifocal consolidation similar to 1/20/2022.       Elevated right hemidiaphragm.       Stable borderline cardiomegaly. Assessment:      Diagnosis Orders   1. Bilateral leg edema  ECHO Complete 2D W Doppler W Color       Plan:   74year old male is here for hospital follow up.   He was admitted last month with COVID19 pneumonia and was discharged on 6 liters O2. He is down to 2 liters. SOB is better. Cough is resolved. However he has bilateral leg edema  He feels better with O2 and being followed by home health to taper O2 down. proBNP on 1/2422 was 154    Plan   Get echo   6MWT with the next visit if he is still on O2.

## 2022-02-16 ENCOUNTER — TELEPHONE (OUTPATIENT)
Dept: INTERNAL MEDICINE CLINIC | Age: 67
End: 2022-02-16

## 2022-02-16 NOTE — TELEPHONE ENCOUNTER
Patient is wanting to discuss his health with the Doctor .  His pulse is 120    BP is 160/90      He is concerned and wanted to talk to someone       Please advise and call

## 2022-02-16 NOTE — TELEPHONE ENCOUNTER
No palpitations, feels fine, HR doesn't seem irregular, and not SOB. More recent BP 140s /80s    Would like him to come in tomorrow for an EKG - can you put him on the nurse visit schedule and let him know what time?

## 2022-02-17 ENCOUNTER — NURSE ONLY (OUTPATIENT)
Dept: INTERNAL MEDICINE CLINIC | Age: 67
End: 2022-02-17
Payer: MEDICARE

## 2022-02-17 DIAGNOSIS — I10 ESSENTIAL HYPERTENSION: Primary | ICD-10-CM

## 2022-02-17 PROCEDURE — 93000 ELECTROCARDIOGRAM COMPLETE: CPT | Performed by: INTERNAL MEDICINE

## 2022-02-18 ENCOUNTER — CARE COORDINATION (OUTPATIENT)
Dept: CASE MANAGEMENT | Age: 67
End: 2022-02-18

## 2022-02-21 ASSESSMENT — ENCOUNTER SYMPTOMS
CHEST TIGHTNESS: 0
COUGH: 0
WHEEZING: 0
SHORTNESS OF BREATH: 0

## 2022-03-03 ENCOUNTER — HOSPITAL ENCOUNTER (OUTPATIENT)
Dept: NON INVASIVE DIAGNOSTICS | Age: 67
Discharge: HOME | End: 2022-03-03
Payer: MEDICARE

## 2022-03-03 LAB
LV EF: 63 %
LVEF MODALITY: NORMAL

## 2022-03-03 PROCEDURE — 93306 TTE W/DOPPLER COMPLETE: CPT

## 2022-03-10 ENCOUNTER — OFFICE VISIT (OUTPATIENT)
Dept: PULMONOLOGY | Age: 67
End: 2022-03-10
Payer: MEDICARE

## 2022-03-10 VITALS
HEIGHT: 71 IN | OXYGEN SATURATION: 98 % | SYSTOLIC BLOOD PRESSURE: 161 MMHG | HEART RATE: 72 BPM | BODY MASS INDEX: 31.78 KG/M2 | TEMPERATURE: 96.6 F | WEIGHT: 227 LBS | DIASTOLIC BLOOD PRESSURE: 91 MMHG

## 2022-03-10 DIAGNOSIS — G47.9 SLEEP DISTURBANCE: ICD-10-CM

## 2022-03-10 DIAGNOSIS — U09.9 POST COVID-19 CONDITION, UNSPECIFIED: ICD-10-CM

## 2022-03-10 DIAGNOSIS — G47.33 OBSTRUCTIVE SLEEP APNEA (ADULT) (PEDIATRIC): Primary | ICD-10-CM

## 2022-03-10 PROCEDURE — 1036F TOBACCO NON-USER: CPT | Performed by: INTERNAL MEDICINE

## 2022-03-10 PROCEDURE — G8484 FLU IMMUNIZE NO ADMIN: HCPCS | Performed by: INTERNAL MEDICINE

## 2022-03-10 PROCEDURE — 3017F COLORECTAL CA SCREEN DOC REV: CPT | Performed by: INTERNAL MEDICINE

## 2022-03-10 PROCEDURE — 4040F PNEUMOC VAC/ADMIN/RCVD: CPT | Performed by: INTERNAL MEDICINE

## 2022-03-10 PROCEDURE — 1123F ACP DISCUSS/DSCN MKR DOCD: CPT | Performed by: INTERNAL MEDICINE

## 2022-03-10 PROCEDURE — G8417 CALC BMI ABV UP PARAM F/U: HCPCS | Performed by: INTERNAL MEDICINE

## 2022-03-10 PROCEDURE — 99214 OFFICE O/P EST MOD 30 MIN: CPT | Performed by: INTERNAL MEDICINE

## 2022-03-10 PROCEDURE — G8427 DOCREV CUR MEDS BY ELIG CLIN: HCPCS | Performed by: INTERNAL MEDICINE

## 2022-03-10 ASSESSMENT — SLEEP AND FATIGUE QUESTIONNAIRES
HOW LIKELY ARE YOU TO NOD OFF OR FALL ASLEEP WHILE LYING DOWN TO REST IN THE AFTERNOON WHEN CIRCUMSTANCES PERMIT: 2
HOW LIKELY ARE YOU TO NOD OFF OR FALL ASLEEP WHILE SITTING QUIETLY AFTER LUNCH WITHOUT ALCOHOL: 1
HOW LIKELY ARE YOU TO NOD OFF OR FALL ASLEEP IN A CAR, WHILE STOPPED FOR A FEW MINUTES IN TRAFFIC: 0
HOW LIKELY ARE YOU TO NOD OFF OR FALL ASLEEP WHILE WATCHING TV: 0
NECK CIRCUMFERENCE (INCHES): 18
HOW LIKELY ARE YOU TO NOD OFF OR FALL ASLEEP WHILE SITTING AND TALKING TO SOMEONE: 1
HOW LIKELY ARE YOU TO NOD OFF OR FALL ASLEEP WHILE SITTING AND READING: 0
ESS TOTAL SCORE: 6
HOW LIKELY ARE YOU TO NOD OFF OR FALL ASLEEP WHILE SITTING INACTIVE IN A PUBLIC PLACE: 1
HOW LIKELY ARE YOU TO NOD OFF OR FALL ASLEEP WHEN YOU ARE A PASSENGER IN A CAR FOR AN HOUR WITHOUT A BREAK: 1

## 2022-03-10 ASSESSMENT — ENCOUNTER SYMPTOMS
SHORTNESS OF BREATH: 0
COUGH: 0
CHEST TIGHTNESS: 0
WHEEZING: 0

## 2022-03-10 NOTE — PROGRESS NOTES
[facility] Pulmonary and Critical Care    Outpatient Note    Subjective:   CHIEF COMPLAINT:   Chief Complaint   Patient presents with    Follow-up     Interval history on 3/10/22  He is currently using O2 at night. His O2 goes down to 88% at night. He has hx of snoring. He has occasional cough. Especially when eating and laying down. Leg edema is better. His usual bed time is 9:30 and rise time is 7:00  Sleep onset is usually 5 minutes  Number of wake ups 2. Number of nocturia 2. Activity before bedtime include watching TV. No TV in bedroom  There is occasional snoring, no witnessed apneas    He feel refreshed in the morning. During the daytime the patient is sometimes sleepy, and takes a nap. There is no morning headache. He drink a cup of coffee in am.  Occasionally drinks Pepsi. Rarely drinks a beer. ESS is 6      HPI:     The patient is 74 y.o. male who presents today for hospital follow up. He was admitted to the hospital on 1/24/22 with COVID19 pneumonia and discharged on 1/27/22  During this stay he required O2 between 4 to 10L via NC. He was treated with decadron and baricitinib. He was discharged on 6 liters. At this time there is no SOB. He is currently on 2 liters O2. Functional capacity is much better. Cough is also better. He did not lose taste and smell.       Past Medical History:    Past Medical History:   Diagnosis Date    Diabetes mellitus ([address]     Gastroesophageal reflux disease without esophagitis     Hypertension     Rash        Social History:    Social History     Tobacco Use   Smoking Status Never Smoker   Smokeless Tobacco Never Used       Family History:  Family History   Problem Relation Age of Onset    Diabetes Mother     No Known Problems Father        Current Medications:  Current Outpatient Medications on File Prior to Visit   Medication Sig Dispense Refill    omeprazole (PRILOSEC) 20 MG delayed release capsule TAKE ONE CAPSULE BY MOUTH EVERY MORNING BEFORE BREAKFAST 90 capsule 1    valsartan-hydroCHLOROthiazide (DIOVAN-HCT) 160-12.5 MG per tablet Take 1 tablet by mouth daily 90 tablet 1    pravastatin (PRAVACHOL) 40 MG tablet Take 40 mg by mouth       metFORMIN (GLUCOPHAGE) 500 MG tablet Take 500 mg by mouth       No current facility-administered medications on file prior to visit. REVIEW OF SYSTEMS:    Review of Systems   Constitutional: Negative for chills, fatigue, fever and unexpected weight change. HENT: Negative for congestion. Respiratory: Negative for cough, chest tightness, shortness of breath and wheezing. Cardiovascular: Negative for chest pain, palpitations and leg swelling. Neurological: Negative for weakness. Psychiatric/Behavioral: The patient is not nervous/anxious. All other systems reviewed and are negative. Objective:   PHYSICAL EXAM:        VITALS:  BP (!) 161/91 (Site: Right Upper Arm, Position: Sitting, Cuff Size: Medium Adult)   Pulse 72   Temp 96.6 °F (35.9 °C) (Infrared)   Ht 5' 11\" (1.803 m)   Wt 227 lb (103 kg)   SpO2 98%   BMI 31.66 kg/m²     Physical Exam  Vitals reviewed. Constitutional:       Appearance: He is well-developed. HENT:      Head: Normocephalic and atraumatic. Eyes:      Pupils: Pupils are equal, round, and reactive to light. Neck:      Vascular: No JVD. Cardiovascular:      Rate and Rhythm: Normal rate and regular rhythm. Heart sounds: No murmur heard. No friction rub. No gallop. Pulmonary:      Effort: Pulmonary effort is normal. No respiratory distress. Breath sounds: Normal breath sounds. No stridor. No wheezing or rales. Abdominal:      General: Bowel sounds are normal.      Palpations: Abdomen is soft. Musculoskeletal:         General: No deformity. Cervical back: Neck supple. Skin:     General: Skin is warm and dry. Neurological:      Mental Status: He is alert and oriented to person, place, and time.    Psychiatric: Behavior: Behavior normal.         DATA:    CT chest on 1/26/22     1. Large geographic areas of groundglass attenuation compatible with bilateral pneumonia. Imaging characteristics most consistent with Covid 19 pneumonia. 2. Small hiatal hernia. 3. Coronary artery calcification. 4. Reactive mediastinal lymph nodes. CXR on 1/24/22  Patchy multifocal consolidation similar to 1/20/2022.       Elevated right hemidiaphragm.       Stable borderline cardiomegaly. Assessment:      Diagnosis Orders   1. Obstructive sleep apnea (adult) (pediatric)   Home Sleep Study   2. Sleep disturbance  Home Sleep Study   3. Post covid-19 condition, unspecified         Plan:   74year old male is here for hospital follow up. He was admitted to the hospital on 1/24 with COVID19 pneumonia and was discharged on 6 liters O2. At this time he uses his O2 only at night. Wife checks his O2 while sleeping and it does go down to 88%. ESS 6  He has hx of hypertension and hx of snoring. His BMI is 31  Neck size 18    Last time he was here he had bilateral leg edema. Echo was ordered and reviewed. He had normal systolic function and normal right ventricular functionl. Leg edema is resolved. proBNP was 154    Plan   D/c O2  Home sleep study. If negative will send for lab sleep study. If he has positive study will order autoCPAP  Advantages and disadvantages of home vs lab sleep tests discussed.

## 2022-03-14 ENCOUNTER — TELEPHONE (OUTPATIENT)
Dept: PULMONOLOGY | Age: 67
End: 2022-03-14

## 2022-03-14 NOTE — TELEPHONE ENCOUNTER
Patient wife called and states that the patient would like to know when is he able to get covid vaccine.  He had covid PNA in Jan 2022

## 2022-03-16 ENCOUNTER — TELEPHONE (OUTPATIENT)
Dept: INTERNAL MEDICINE CLINIC | Age: 67
End: 2022-03-16

## 2022-03-17 ENCOUNTER — TELEPHONE (OUTPATIENT)
Dept: INTERNAL MEDICINE CLINIC | Age: 67
End: 2022-03-17

## 2022-03-17 NOTE — TELEPHONE ENCOUNTER
----- Message from Major  sent at 3/17/2022 11:18 AM EDT -----  Subject: Message to Provider    QUESTIONS  Information for Provider? Mary Jane from [address] [place] [address] called to   let dr. osborn know that the patient was discharge on 3/16/22 please   [phone]  ---------------------------------------------------------------------------  --------------  CALL BACK INFO  What is the best way for the office to contact you? OK to leave message on   voicemail  Preferred Call Back Phone Number? [phone]  ---------------------------------------------------------------------------  --------------  SCRIPT ANSWERS  Relationship to Patient? Third Party  Representative Name?  Jennifer Nuon

## 2022-04-06 ENCOUNTER — HOSPITAL ENCOUNTER (OUTPATIENT)
Dept: SLEEP CENTER | Age: 67
Discharge: HOME | End: 2022-04-06
Payer: MEDICARE

## 2022-04-06 DIAGNOSIS — G47.33 OBSTRUCTIVE SLEEP APNEA (ADULT) (PEDIATRIC): ICD-10-CM

## 2022-04-06 DIAGNOSIS — G47.9 SLEEP DISTURBANCE: ICD-10-CM

## 2022-04-06 PROCEDURE — 95806 SLEEP STUDY UNATT&RESP EFFT: CPT

## 2022-04-07 DIAGNOSIS — I10 PRIMARY HYPERTENSION: ICD-10-CM

## 2022-04-07 DIAGNOSIS — E11.9 TYPE 2 DIABETES MELLITUS WITHOUT COMPLICATION, WITHOUT LONG-TERM CURRENT USE OF INSULIN (HCC): ICD-10-CM

## 2022-04-07 DIAGNOSIS — Z12.5 ENCOUNTER FOR SCREENING FOR MALIGNANT NEOPLASM OF PROSTATE: ICD-10-CM

## 2022-04-08 LAB
A/G RATIO: 1.6 (ref 1.1–2.2)
ALBUMIN SERPL-MCNC: 4.6 G/DL (ref 3.4–5)
ALP BLD-CCNC: 67 U/L (ref 40–129)
ALT SERPL-CCNC: 17 U/L (ref 10–40)
ANION GAP SERPL CALCULATED.3IONS-SCNC: 16 MMOL/L (ref 3–16)
AST SERPL-CCNC: 17 U/L (ref 15–37)
BASOPHILS ABSOLUTE: 0.1 K/UL (ref 0–0.2)
BASOPHILS RELATIVE PERCENT: 0.6 %
BILIRUB SERPL-MCNC: 0.6 MG/DL (ref 0–1)
BUN BLDV-MCNC: 19 MG/DL (ref 7–20)
CALCIUM SERPL-MCNC: 10.1 MG/DL (ref 8.3–10.6)
CHLORIDE BLD-SCNC: 99 MMOL/L (ref 99–110)
CHOLESTEROL, TOTAL: 179 MG/DL (ref 0–199)
CO2: 27 MMOL/L (ref 21–32)
CREAT SERPL-MCNC: 0.9 MG/DL (ref 0.8–1.3)
EOSINOPHILS ABSOLUTE: 0.5 K/UL (ref 0–0.6)
EOSINOPHILS RELATIVE PERCENT: 6.4 %
ESTIMATED AVERAGE GLUCOSE: 128.4 MG/DL
GFR AFRICAN AMERICAN: >60
GFR NON-AFRICAN AMERICAN: >60
GLUCOSE BLD-MCNC: 107 MG/DL (ref 70–99)
HBA1C MFR BLD: 6.1 %
HCT VFR BLD CALC: 47.5 % (ref 40.5–52.5)
HDLC SERPL-MCNC: 43 MG/DL (ref 40–60)
HEMOGLOBIN: 15.9 G/DL (ref 13.5–17.5)
LDL CHOLESTEROL CALCULATED: 116 MG/DL
LYMPHOCYTES ABSOLUTE: 2.3 K/UL (ref 1–5.1)
LYMPHOCYTES RELATIVE PERCENT: 28 %
MCH RBC QN AUTO: 31.2 PG (ref 26–34)
MCHC RBC AUTO-ENTMCNC: 33.5 G/DL (ref 31–36)
MCV RBC AUTO: 93 FL (ref 80–100)
MONOCYTES ABSOLUTE: 0.6 K/UL (ref 0–1.3)
MONOCYTES RELATIVE PERCENT: 6.8 %
NEUTROPHILS ABSOLUTE: 4.8 K/UL (ref 1.7–7.7)
NEUTROPHILS RELATIVE PERCENT: 58.2 %
PDW BLD-RTO: 14.5 % (ref 12.4–15.4)
PLATELET # BLD: 139 K/UL (ref 135–450)
PLATELET SLIDE REVIEW: NORMAL
PMV BLD AUTO: 8.6 FL (ref 5–10.5)
POTASSIUM SERPL-SCNC: 4 MMOL/L (ref 3.5–5.1)
PROSTATE SPECIFIC ANTIGEN: 1.35 NG/ML (ref 0–4)
RBC # BLD: 5.11 M/UL (ref 4.2–5.9)
RBC # BLD: NORMAL 10*6/UL
SODIUM BLD-SCNC: 142 MMOL/L (ref 136–145)
TOTAL PROTEIN: 7.4 G/DL (ref 6.4–8.2)
TRIGL SERPL-MCNC: 98 MG/DL (ref 0–150)
VLDLC SERPL CALC-MCNC: 20 MG/DL
WBC # BLD: 8.2 K/UL (ref 4–11)

## 2022-04-09 PROCEDURE — 95806 SLEEP STUDY UNATT&RESP EFFT: CPT | Performed by: INTERNAL MEDICINE

## 2022-04-13 ENCOUNTER — OFFICE VISIT (OUTPATIENT)
Dept: INTERNAL MEDICINE CLINIC | Age: 67
End: 2022-04-13
Payer: MEDICARE

## 2022-04-13 VITALS
BODY MASS INDEX: 32.06 KG/M2 | WEIGHT: 229 LBS | SYSTOLIC BLOOD PRESSURE: 134 MMHG | DIASTOLIC BLOOD PRESSURE: 70 MMHG | HEIGHT: 71 IN

## 2022-04-13 DIAGNOSIS — K21.9 GASTROESOPHAGEAL REFLUX DISEASE WITHOUT ESOPHAGITIS: ICD-10-CM

## 2022-04-13 DIAGNOSIS — Z12.11 ENCOUNTER FOR SCREENING FOR MALIGNANT NEOPLASM OF COLON: ICD-10-CM

## 2022-04-13 DIAGNOSIS — Z00.00 INITIAL MEDICARE ANNUAL WELLNESS VISIT: Primary | ICD-10-CM

## 2022-04-13 DIAGNOSIS — I10 ESSENTIAL HYPERTENSION: ICD-10-CM

## 2022-04-13 DIAGNOSIS — E11.9 TYPE 2 DIABETES MELLITUS WITHOUT COMPLICATION, WITHOUT LONG-TERM CURRENT USE OF INSULIN (HCC): ICD-10-CM

## 2022-04-13 PROCEDURE — 3017F COLORECTAL CA SCREEN DOC REV: CPT | Performed by: INTERNAL MEDICINE

## 2022-04-13 PROCEDURE — 1123F ACP DISCUSS/DSCN MKR DOCD: CPT | Performed by: INTERNAL MEDICINE

## 2022-04-13 PROCEDURE — 3044F HG A1C LEVEL LT 7.0%: CPT | Performed by: INTERNAL MEDICINE

## 2022-04-13 PROCEDURE — 4040F PNEUMOC VAC/ADMIN/RCVD: CPT | Performed by: INTERNAL MEDICINE

## 2022-04-13 PROCEDURE — G0438 PPPS, INITIAL VISIT: HCPCS | Performed by: INTERNAL MEDICINE

## 2022-04-13 RX ORDER — BLOOD SUGAR DIAGNOSTIC
1 STRIP MISCELLANEOUS DAILY
Qty: 100 EACH | Refills: 3 | Status: SHIPPED | OUTPATIENT
Start: 2022-04-13 | End: 2022-04-19 | Stop reason: ALTCHOICE

## 2022-04-13 ASSESSMENT — LIFESTYLE VARIABLES
HOW OFTEN DO YOU HAVE A DRINK CONTAINING ALCOHOL: MONTHLY OR LESS
HOW MANY STANDARD DRINKS CONTAINING ALCOHOL DO YOU HAVE ON A TYPICAL DAY: 1 OR 2

## 2022-04-13 ASSESSMENT — PATIENT HEALTH QUESTIONNAIRE - PHQ9
6. FEELING BAD ABOUT YOURSELF - OR THAT YOU ARE A FAILURE OR HAVE LET YOURSELF OR YOUR FAMILY DOWN: 0
3. TROUBLE FALLING OR STAYING ASLEEP: 0
9. THOUGHTS THAT YOU WOULD BE BETTER OFF DEAD, OR OF HURTING YOURSELF: 0
1. LITTLE INTEREST OR PLEASURE IN DOING THINGS: 0
SUM OF ALL RESPONSES TO PHQ QUESTIONS 1-9: 0
SUM OF ALL RESPONSES TO PHQ QUESTIONS 1-9: 0
SUM OF ALL RESPONSES TO PHQ9 QUESTIONS 1 & 2: 0
7. TROUBLE CONCENTRATING ON THINGS, SUCH AS READING THE NEWSPAPER OR WATCHING TELEVISION: 0
10. IF YOU CHECKED OFF ANY PROBLEMS, HOW DIFFICULT HAVE THESE PROBLEMS MADE IT FOR YOU TO DO YOUR WORK, TAKE CARE OF THINGS AT HOME, OR GET ALONG WITH OTHER PEOPLE: 0
8. MOVING OR SPEAKING SO SLOWLY THAT OTHER PEOPLE COULD HAVE NOTICED. OR THE OPPOSITE, BEING SO FIGETY OR RESTLESS THAT YOU HAVE BEEN MOVING AROUND A LOT MORE THAN USUAL: 0
5. POOR APPETITE OR OVEREATING: 0
2. FEELING DOWN, DEPRESSED OR HOPELESS: 0
4. FEELING TIRED OR HAVING LITTLE ENERGY: 0
SUM OF ALL RESPONSES TO PHQ QUESTIONS 1-9: 0
SUM OF ALL RESPONSES TO PHQ QUESTIONS 1-9: 0

## 2022-04-13 NOTE — PROGRESS NOTES
Medicare Annual Wellness Visit    Bharti Price is here for Medicare AWV    Assessment & Plan   Initial Medicare annual wellness visit  Essential hypertension  -Controlled, continue valsartan-HCTZ 160-12.5 mg daily  -     Comprehensive Metabolic Panel; Future  -     CBC with Auto Differential; Future  -     Lipid Panel; Future  -     Hemoglobin A1C; Future  Type 2 diabetes mellitus without complication, without long-term current use of insulin ([address]  -Working on weight loss, he would like to try getting off of the metformin if possible. He will continue working on weight loss, if A1c is under 6 then would be okay with discontinuing metformin  -     Comprehensive Metabolic Panel; Future  -     CBC with Auto Differential; Future  -     Lipid Panel; Future  -     Hemoglobin A1C; Future  Gastroesophageal reflux disease without esophagitis   -Omeprazole started for chronic cough, will discontinue and see if cough returns  Encounter for screening for malignant neoplasm of colon  -     Fecal DNA Colorectal cancer screening (Cologuard)      Recommendations for Preventive Services Due: see orders and patient instructions/AVS.  Recommended screening schedule for the next 5-10 years is provided to the patient in written form: see Patient Instructions/AVS.     Return in 6 months (on 10/13/2022) for DM follow up, HTN follow up. Subjective   The following acute and/or chronic problems were also addressed today:    Wondering if he needs to stay on the omeprazole    Working on weight loss    Patient's complete Health Risk Assessment and screening values have been reviewed and are found in [address] The following problems were reviewed today and where indicated follow up appointments were made and/or referrals ordered.     Positive Risk Factor Screenings with Interventions:               General Health and ACP:  General  In general, how would you say your health is?: Very Good  In the past 7 days, have you experienced any of the following: New or Increased Pain, New or Increased Fatigue, Loneliness, Social Isolation, Stress or Anger?: No  Do you get the social and emotional support that you need?: Yes  Do you have a Living Will?: Yes    Advance Directives     Power of [address] Will ACP-Advance Directive ACP-Power of     Not on File Not on File Not on File Not on File      General Health Risk Interventions:  · No Living Will: ACP documents already completed- patient asked to provide copy to the office    Health Habits/Nutrition:     Physical Activity: Insufficiently Active    Days of Exercise per Week: 2 days    Minutes of Exercise per Session: 50 min     Have you lost any weight without trying in the past 3 months?: (!) Yes  Body mass index: (!) 31.94  Have you seen the dentist within the past year?: Appointment is scheduled    Health Habits/Nutrition Interventions:  · Nutritional issues:  Weight was lost in the setting of significant illness, he is working on continuing weight loss     Safety:  Do you have working smoke detectors?: Yes  Do you have any tripping hazards - loose or unsecured carpets or rugs?: No  Do you have any tripping hazards - clutter in doorways, halls, or stairs?: No  Do you have either shower bars, grab bars, non-slip mats or non-slip surfaces in your shower or bathtub?: (!) No  Do all of your stairways have a railing or banister?: Yes  Do you always fasten your seatbelt when you are in a car?: Yes    Safety Interventions:  · Home safety tips provided           Objective   Vitals:    04/13/22 1053   BP: 134/70   Site: Right Upper Arm   Weight: 229 lb (103.9 kg)   Height: 5' 11\" (1.803 m)      Body mass index is 31.94 kg/m². Physical Exam  Vitals reviewed. Constitutional:       General: He is not in acute distress. Appearance: Normal appearance. He is well-developed. HENT:      Head: Normocephalic and atraumatic.       Right Ear: Tympanic membrane, ear canal and external ear normal. Patient Care Team:  Sekou Nunez MD as PCP - General (Internal Medicine)  Sekou Nunez MD as PCP - [address] [place] [address] Provider  Cleopatra Perea MD as Consulting Physician (Pulmonology)    Reviewed and updated this visit:  Tobacco  Allergies  Meds  Problems  Med Hx  Surg Hx  Soc Hx  Fam Hx

## 2022-04-14 ENCOUNTER — TELEPHONE (OUTPATIENT)
Dept: PULMONOLOGY | Age: 67
End: 2022-04-14

## 2022-04-18 PROBLEM — J12.82 PNEUMONIA DUE TO COVID-19 VIRUS: Status: RESOLVED | Noted: 2022-01-21 | Resolved: 2022-04-18

## 2022-04-18 PROBLEM — U07.1 PNEUMONIA DUE TO COVID-19 VIRUS: Status: RESOLVED | Noted: 2022-01-21 | Resolved: 2022-04-18

## 2022-04-18 PROBLEM — U07.1 COVID: Status: RESOLVED | Noted: 2022-01-24 | Resolved: 2022-04-18

## 2022-04-19 ENCOUNTER — TELEPHONE (OUTPATIENT)
Dept: INTERNAL MEDICINE CLINIC | Age: 67
End: 2022-04-19

## 2022-04-19 RX ORDER — GLUCOSAMINE HCL/CHONDROITIN SU 500-400 MG
CAPSULE ORAL
Qty: 100 STRIP | Refills: 3 | Status: SHIPPED | OUTPATIENT
Start: 2022-04-19 | End: 2022-05-04 | Stop reason: SDUPTHER

## 2022-04-19 NOTE — TELEPHONE ENCOUNTER
Pt says that the test strips ordered his insurance will not cover it and wants to speak with Dr. Lamar Lujan about if he can receive a different brand that way his insurance will cover it.     blood glucose test strips (ONETOUCH VERIO) strip         Please call and advise

## 2022-05-04 ENCOUNTER — TELEPHONE (OUTPATIENT)
Dept: INTERNAL MEDICINE CLINIC | Age: 67
End: 2022-05-04

## 2022-05-04 DIAGNOSIS — E11.9 TYPE 2 DIABETES MELLITUS WITHOUT COMPLICATION, WITHOUT LONG-TERM CURRENT USE OF INSULIN (HCC): Primary | ICD-10-CM

## 2022-05-04 RX ORDER — GLUCOSAMINE HCL/CHONDROITIN SU 500-400 MG
CAPSULE ORAL
Qty: 50 STRIP | Refills: 3 | Status: SHIPPED | OUTPATIENT
Start: 2022-05-04

## 2022-05-04 NOTE — TELEPHONE ENCOUNTER
----- Message from Molina Reza sent at 5/4/2022  7:45 AM EDT -----  Subject: Medication Problem    QUESTIONS  Name of Medication? blood glucose monitor strips  Patient-reported dosage and instructions? in morning  What question or problem do you have with the medication? URGENT? need Dr. Lucretia Recio to fax to [facility] , need the diagnosis   code for diabetic test strip the test strip need is for the one touch   [employer] ,   Preferred Pharmacy? [facility] 21 [phone] Luann Fernandez [address] [facility]  Pharmacy phone number (if available)? [phone]  Additional Information for Provider? patient need the 50 count strips and   patient is almost out.  ---------------------------------------------------------------------------  --------------  [address]  What is the best way for the office to contact you? OK to leave message on   voicemail  Preferred Call Back Phone Number? [phone]  ---------------------------------------------------------------------------  --------------  SCRIPT ANSWERS  Relationship to Patient?  Self

## 2022-05-10 RX ORDER — VALSARTAN AND HYDROCHLOROTHIAZIDE 160; 12.5 MG/1; MG/1
TABLET, FILM COATED ORAL
Qty: 90 TABLET | Refills: 1 | Status: SHIPPED | OUTPATIENT
Start: 2022-05-10 | End: 2022-10-19

## 2022-06-03 ENCOUNTER — TELEPHONE (OUTPATIENT)
Dept: PULMONOLOGY | Age: 67
End: 2022-06-03

## 2022-07-05 ENCOUNTER — TELEPHONE (OUTPATIENT)
Dept: PULMONOLOGY | Age: 67
End: 2022-07-05

## 2022-07-05 NOTE — TELEPHONE ENCOUNTER
Please advise patient had Home slepp study done on Room air and the lowest oxygen sat seen was 93% do patient still need nocturnal oxygen?

## 2022-07-07 NOTE — TELEPHONE ENCOUNTER
Patient calls saying that the home sleep study results are incorrect because he wore his O2 that night so his O2 levels never dropped. He has CPAP machine but unable to use it. He would like to know if he should do a sleep study in the sleep lab?

## 2022-07-12 ENCOUNTER — TELEPHONE (OUTPATIENT)
Dept: PULMONOLOGY | Age: 67
End: 2022-07-12

## 2022-07-12 NOTE — TELEPHONE ENCOUNTER
Spoke to Rudolph Colon from [facility]--explained the pt's questions and was told to send an order to [facility] for Overnight Pulse Ox on CPAP w/no O2. I have asked Dr. Tali Dumont to sign order since Dr. Luis Moulton is rounding at the hospital.  Patient is aware of the above and will be expecting testing             Note  Patient calls saying that the home sleep study results are incorrect because he wore his O2 that night so his O2 levels never dropped. He has CPAP machine but unable to use it.  He would like to know if he should do a sleep study in the sleep lab?              diversional activity

## 2022-07-15 NOTE — TELEPHONE ENCOUNTER
Spoke with [facility], they are waiting for devises to come in the office.  Pt will be called Monday or Tuesday to set up time to deliver

## 2022-07-20 ENCOUNTER — TELEPHONE (OUTPATIENT)
Dept: PULMONOLOGY | Age: 67
End: 2022-07-20

## 2022-07-20 NOTE — TELEPHONE ENCOUNTER
Pt had to cancel appt Friday 7/22/2022 because he is traveling to [facility] for more testing. He then has rehab at [facility]. He is extremely busy with appts right now. He has been doing well, no issues.  He will call back as soon as they can after [facility] testing

## 2022-07-28 DIAGNOSIS — G47.33 OSA (OBSTRUCTIVE SLEEP APNEA): Primary | ICD-10-CM

## 2022-07-28 NOTE — PROGRESS NOTES
Overnight pulse ox report reviewed. ROMA is 21. Time SpO2 <-88 is 16.8 min  Time consecutive <=88 is 2.3 min. Home sleep study in April is consistent with mild KATINA with BROOKLYNN of 7.6. He has new auto CPAP, however he used it for few days. The above mentioned pulse ox study was done on CPAP, data download obtained and he used the machine that night for 4 hours. Apparently same time of overnight pulse ox. Data at this time is not sufficient to make changes    Will see him in 1-2 months to get data download and reassess.

## 2022-08-11 RX ORDER — OMEPRAZOLE 20 MG/1
CAPSULE, DELAYED RELEASE ORAL
Qty: 90 CAPSULE | Refills: 1 | Status: SHIPPED | OUTPATIENT
Start: 2022-08-11

## 2022-08-18 ENCOUNTER — TELEPHONE (OUTPATIENT)
Dept: PULMONOLOGY | Age: 67
End: 2022-08-18

## 2022-08-18 NOTE — TELEPHONE ENCOUNTER
Patient and spouse stopped in the office to let us know they think pt needs an overnight sleep study in the lab. Spouse checked his O2 last night while asleep & it was 83%. Pt said that overnight pulse ox was placed on his wrist not his finger. He would really like to do a sleep study in the lab so he can be monitored by the nurses. Thoughts? Advise.  Help!!!

## 2022-08-22 NOTE — TELEPHONE ENCOUNTER
Spoke with Norma Zazueta at [facility]--pt is using CPAP periodically. Sometimes at night and sometimes during the day.    Norma Zazueta is going to fax sleep compliance

## 2022-08-22 NOTE — TELEPHONE ENCOUNTER
Pt called again asking about sleep study. Please call patient and discuss sleep study, overnight pulse ox, and dropping O2 at night while asleep.

## 2022-08-23 NOTE — TELEPHONE ENCOUNTER
Wife, Christella Sandhoff, calls asking if more info can be relayed regarding CPAP instructions. She says that patient is afraid to use CPAP consistently because while he was wearing it, and sleeping, wife checked pulse ox with oximeter and O2 sat was 83%. She has checked for accuracy of oximeter and it registers appropriately with wife. She has checked patient's pulse ox on multiple occassions. Patient does have O2 in the home from his recovery from [place]. Occasionally he uses O2, via nasal cannula at 2 LPM, without CPAP, at night. Wife checks it then, and O2 sats is 95%. Dr. Cate Ray asked that an appt be made to discuss in person.   Appt made for Wed., 08/24/2022 at 10:40 AM

## 2022-08-24 ENCOUNTER — OFFICE VISIT (OUTPATIENT)
Dept: PULMONOLOGY | Age: 67
End: 2022-08-24
Payer: MEDICARE

## 2022-08-24 VITALS
WEIGHT: 235 LBS | HEART RATE: 75 BPM | HEIGHT: 71 IN | DIASTOLIC BLOOD PRESSURE: 87 MMHG | OXYGEN SATURATION: 98 % | TEMPERATURE: 96.6 F | SYSTOLIC BLOOD PRESSURE: 144 MMHG | BODY MASS INDEX: 32.9 KG/M2

## 2022-08-24 DIAGNOSIS — G47.33 OSA (OBSTRUCTIVE SLEEP APNEA): Primary | ICD-10-CM

## 2022-08-24 PROCEDURE — 1124F ACP DISCUSS-NO DSCNMKR DOCD: CPT | Performed by: INTERNAL MEDICINE

## 2022-08-24 PROCEDURE — G8417 CALC BMI ABV UP PARAM F/U: HCPCS | Performed by: INTERNAL MEDICINE

## 2022-08-24 PROCEDURE — 3017F COLORECTAL CA SCREEN DOC REV: CPT | Performed by: INTERNAL MEDICINE

## 2022-08-24 PROCEDURE — 99214 OFFICE O/P EST MOD 30 MIN: CPT | Performed by: INTERNAL MEDICINE

## 2022-08-24 PROCEDURE — 1036F TOBACCO NON-USER: CPT | Performed by: INTERNAL MEDICINE

## 2022-08-24 PROCEDURE — G8427 DOCREV CUR MEDS BY ELIG CLIN: HCPCS | Performed by: INTERNAL MEDICINE

## 2022-08-24 ASSESSMENT — ENCOUNTER SYMPTOMS
CHEST TIGHTNESS: 0
WHEEZING: 0
SHORTNESS OF BREATH: 0
COUGH: 0

## 2022-08-24 NOTE — PROGRESS NOTES
[facility]    Outpatient Note    Subjective:   CHIEF COMPLAINT:   Chief Complaint   Patient presents with    Follow-up     Interval history on 8/24/22  Wife checked his O2 sat at night while sleeping and it was 83%. He was worried that there is problem in the CPAP. He is here today to discuss this issue. Interval history on 3/10/22  He is currently using O2 at night. His O2 goes down to 88% at night. He has hx of snoring. He has occasional cough. Especially when eating and laying down. Leg edema is better. His usual bed time is 9:30 and rise time is 7:00  Sleep onset is usually 5 minutes  Number of wake ups 2. Number of nocturia 2. Activity before bedtime include watching TV. No TV in bedroom  There is occasional snoring, no witnessed apneas    He feel refreshed in the morning. During the daytime the patient is sometimes sleepy, and takes a nap. There is no morning headache. He drink a cup of coffee in am.  Occasionally drinks Pepsi. Rarely drinks a beer. ESS is 6      HPI:     The patient is 69 y.o. male who presents today for hospital follow up. He was admitted to the hospital on 1/24/22 with COVID19 pneumonia and discharged on 1/27/22  During this stay he required O2 between 4 to 10L via NC. He was treated with decadron and baricitinib. He was discharged on 6 liters. At this time there is no SOB. He is currently on 2 liters O2. Functional capacity is much better. Cough is also better. He did not lose taste and smell.       Past Medical History:    Past Medical History:   Diagnosis Date    COVID 1/24/2022    Diabetes mellitus (HCC)     Gastroesophageal reflux disease without esophagitis     Hypertension     Pneumonia due to COVID-19 virus     Rash        Social History:    Social History     Tobacco Use   Smoking Status Never   Smokeless Tobacco Never       Family History:  Family History   Problem Relation Age of Onset Diabetes Mother     No Known Problems Father        Current Medications:  Current Outpatient Medications on File Prior to Visit   Medication Sig Dispense Refill    omeprazole (PRILOSEC) 20 MG delayed release capsule TAKE ONE CAPSULE BY MOUTH EVERY MORNING BEFORE BREAKFAST 90 capsule 1    valsartan-hydroCHLOROthiazide (DIOVAN-HCT) 160-12.5 MG per tablet TAKE ONE TABLET BY MOUTH DAILY 90 tablet 1    blood glucose monitor strips Test daily 50 strip 3    pravastatin (PRAVACHOL) 40 MG tablet Take 40 mg by mouth       metFORMIN (GLUCOPHAGE) 500 MG tablet Take 500 mg by mouth       No current facility-administered medications on file prior to visit. REVIEW OF SYSTEMS:    Review of Systems   Constitutional:  Negative for chills, fatigue, fever and unexpected weight change. HENT:  Negative for congestion. Respiratory:  Negative for cough, chest tightness, shortness of breath and wheezing. Cardiovascular:  Negative for chest pain, palpitations and leg swelling. Neurological:  Negative for weakness. Psychiatric/Behavioral:  The patient is not nervous/anxious. All other systems reviewed and are negative. Objective:   PHYSICAL EXAM:        VITALS:  BP (!) 144/87 (Site: Right Upper Arm, Position: Sitting, Cuff Size: Large Adult)   Pulse 75   Temp (!) 96.6 °F (35.9 °C) (Infrared)   Ht 5' 11\" (1.803 m)   Wt 235 lb (106.6 kg)   SpO2 98% Comment: on room air  BMI 32.78 kg/m²     Physical Exam  Vitals reviewed. Constitutional:       Appearance: He is well-developed. HENT:      Head: Normocephalic and atraumatic. Eyes:      Pupils: Pupils are equal, round, and reactive to light. Neck:      Vascular: No JVD. Cardiovascular:      Rate and Rhythm: Normal rate and regular rhythm. Heart sounds: No murmur heard. No friction rub. No gallop. Pulmonary:      Effort: Pulmonary effort is normal. No respiratory distress. Breath sounds: Normal breath sounds. No stridor. No wheezing or rales. Abdominal:      General: Bowel sounds are normal.      Palpations: Abdomen is soft. Musculoskeletal:         General: No deformity. Cervical back: Neck supple. Skin:     General: Skin is warm and dry. Neurological:      Mental Status: He is alert and oriented to person, place, and time. Psychiatric:         Behavior: Behavior normal.       DATA:    CT chest on 1/26/22     1. Large geographic areas of groundglass attenuation compatible with bilateral pneumonia. Imaging characteristics most consistent with Covid 19 pneumonia. 2. Small hiatal hernia. 3. Coronary artery calcification. 4. Reactive mediastinal lymph nodes. CXR on 1/24/22  Patchy multifocal consolidation similar to 1/20/2022. Elevated right hemidiaphragm. Stable borderline cardiomegaly. Assessment:      Diagnosis Orders   1. KATINA (obstructive sleep apnea)            Plan:   74year old male with KATINA. He was admitted to the hospital on 1/24 with COVID19 pneumonia and was discharged on 6 liters O2. Currently on room air. He was worked up for sleep apnea as wife noticed low SpO2 at night. ESS 6  He has hx of hypertension and hx of snoring. His BMI is 31  Neck size 18    Home sleep test with BROOKLYNN of 93 with lowest O2 sat of 93  2nd pulse ox study done on CPAP: ROMA was 21 with SpO2 <88 was 16.8 min with time consecutive <=88 was 2.3 min. However, on data download he is not using the CPAP much because he is worried his low O2 number are due to CPAP. Download from 7/20/22 to 8/18/22 reviewed. Usate days 23/30 but average usage days used is 1:14 h. Plan   Keep off O2 for now. Use auto CPAP at least four hours a night for the next 1-2 months after that will get data download to reassess. Consider overnight pulse ox study when he is more consistent with CPAP usage.

## 2022-09-07 LAB — NONINV COLON CA DNA+OCC BLD SCRN STL QL: POSITIVE

## 2022-09-20 ENCOUNTER — TELEPHONE (OUTPATIENT)
Dept: INTERNAL MEDICINE CLINIC | Age: 67
End: 2022-09-20

## 2022-10-01 NOTE — CARE COORDINATION
Tim 45 Transitions Follow Up Call    2022    Patient: Tim Sanchez   Patient : 1955   MRN: [id number]   Reason for Admission: covid 19 monitoring   Discharge Date: 22 RARS: Readmission Risk Score: 11.5 ( )         Spoke with: Govind Sherie 17 Transitions Subsequent and Final Call    Subsequent and Final Calls  Do you have any ongoing symptoms?: No  Have your medications changed?: No  Do you have any questions related to your medications?: No  Do you currently have any active services?: No  Do you have any needs or concerns that I can assist you with?: No  Identified Barriers: None  Care Transitions Interventions  Other Interventions:         SUMMARY  CTN spoke to the Pt who denies s/s r/t Covid 19. Pt denies issues with appetite, urination, and bowel habits. Pt confirms they have all meds and taking as prescribed. From CDC: Are you at higher risk for severe illness?  Wash your hands often.  Avoid close contact (6 feet, which is about two arm lengths) with people who are sick.  Put distance between yourself and other people if COVID-19 is spreading in your community.  Clean and disinfect frequently touched surfaces.  Avoid all cruise travel and non-essential air travel.  Call your healthcare professional if you have concerns about COVID-19 and your underlying condition or if you are sick. Pt will take all meds as prescribed and schedule / keep doctor's appt. [facility] provided education on s/s that require medical attention and when to seek medical attention. [facility] advised Pt of use urgent care or physician's 24 hr access line if assistance is needed after hours or on the weekend. Pt denies any needs or concerns and is agreeable with additional calls.     Follow Up  Future Appointments   Date Time Provider Carlos Velazquez   2022 12:00 PM Estela Abraham MD [facility] P/CC WILLA Lujan RN Not applicable

## 2022-10-12 DIAGNOSIS — E11.9 TYPE 2 DIABETES MELLITUS WITHOUT COMPLICATION, WITHOUT LONG-TERM CURRENT USE OF INSULIN (HCC): ICD-10-CM

## 2022-10-12 DIAGNOSIS — I10 ESSENTIAL HYPERTENSION: ICD-10-CM

## 2022-10-12 LAB
A/G RATIO: 1.5 (ref 1.1–2.2)
ALBUMIN SERPL-MCNC: 4.5 G/DL (ref 3.4–5)
ALP BLD-CCNC: 69 U/L (ref 40–129)
ALT SERPL-CCNC: 21 U/L (ref 10–40)
ANION GAP SERPL CALCULATED.3IONS-SCNC: 11 MMOL/L (ref 3–16)
AST SERPL-CCNC: 19 U/L (ref 15–37)
BASOPHILS ABSOLUTE: 0.1 K/UL (ref 0–0.2)
BASOPHILS RELATIVE PERCENT: 1.3 %
BILIRUB SERPL-MCNC: 0.8 MG/DL (ref 0–1)
BUN BLDV-MCNC: 19 MG/DL (ref 7–20)
CALCIUM SERPL-MCNC: 9.9 MG/DL (ref 8.3–10.6)
CHLORIDE BLD-SCNC: 101 MMOL/L (ref 99–110)
CHOLESTEROL, TOTAL: 201 MG/DL (ref 0–199)
CO2: 30 MMOL/L (ref 21–32)
CREAT SERPL-MCNC: 1 MG/DL (ref 0.8–1.3)
EOSINOPHILS ABSOLUTE: 0.8 K/UL (ref 0–0.6)
EOSINOPHILS RELATIVE PERCENT: 8.9 %
GFR AFRICAN AMERICAN: >60
GFR NON-AFRICAN AMERICAN: >60
GLUCOSE BLD-MCNC: 127 MG/DL (ref 70–99)
HCT VFR BLD CALC: 46.1 % (ref 40.5–52.5)
HDLC SERPL-MCNC: 40 MG/DL (ref 40–60)
HEMOGLOBIN: 15.8 G/DL (ref 13.5–17.5)
LDL CHOLESTEROL CALCULATED: 136 MG/DL
LYMPHOCYTES ABSOLUTE: 2.7 K/UL (ref 1–5.1)
LYMPHOCYTES RELATIVE PERCENT: 30.9 %
MCH RBC QN AUTO: 31.4 PG (ref 26–34)
MCHC RBC AUTO-ENTMCNC: 34.3 G/DL (ref 31–36)
MCV RBC AUTO: 91.7 FL (ref 80–100)
MONOCYTES ABSOLUTE: 0.8 K/UL (ref 0–1.3)
MONOCYTES RELATIVE PERCENT: 9 %
NEUTROPHILS ABSOLUTE: 4.4 K/UL (ref 1.7–7.7)
NEUTROPHILS RELATIVE PERCENT: 49.9 %
PDW BLD-RTO: 14.1 % (ref 12.4–15.4)
PLATELET # BLD: 242 K/UL (ref 135–450)
PMV BLD AUTO: 7.5 FL (ref 5–10.5)
POTASSIUM SERPL-SCNC: 4.4 MMOL/L (ref 3.5–5.1)
RBC # BLD: 5.02 M/UL (ref 4.2–5.9)
SODIUM BLD-SCNC: 142 MMOL/L (ref 136–145)
TOTAL PROTEIN: 7.6 G/DL (ref 6.4–8.2)
TRIGL SERPL-MCNC: 125 MG/DL (ref 0–150)
VLDLC SERPL CALC-MCNC: 25 MG/DL
WBC # BLD: 8.7 K/UL (ref 4–11)

## 2022-10-13 LAB
ESTIMATED AVERAGE GLUCOSE: 131.2 MG/DL
HBA1C MFR BLD: 6.2 %

## 2022-10-17 ENCOUNTER — TELEPHONE (OUTPATIENT)
Dept: INTERNAL MEDICINE CLINIC | Age: 67
End: 2022-10-17

## 2022-10-17 NOTE — TELEPHONE ENCOUNTER
Chart audit shows patient had a positive (abnormal) Cologuard test completed 8/31/2022. Audit shows results were entered correctly, ordering physician/APC reviewed and follow up plan in place.

## 2022-10-19 ENCOUNTER — OFFICE VISIT (OUTPATIENT)
Dept: INTERNAL MEDICINE CLINIC | Age: 67
End: 2022-10-19
Payer: MEDICARE

## 2022-10-19 VITALS
TEMPERATURE: 97.4 F | BODY MASS INDEX: 33.18 KG/M2 | OXYGEN SATURATION: 97 % | SYSTOLIC BLOOD PRESSURE: 138 MMHG | HEIGHT: 71 IN | WEIGHT: 237 LBS | DIASTOLIC BLOOD PRESSURE: 88 MMHG | HEART RATE: 84 BPM

## 2022-10-19 DIAGNOSIS — Z12.5 ENCOUNTER FOR SCREENING FOR MALIGNANT NEOPLASM OF PROSTATE: ICD-10-CM

## 2022-10-19 DIAGNOSIS — I10 ESSENTIAL HYPERTENSION: Primary | ICD-10-CM

## 2022-10-19 DIAGNOSIS — Z23 NEED FOR PNEUMOCOCCAL VACCINE: ICD-10-CM

## 2022-10-19 DIAGNOSIS — E11.9 TYPE 2 DIABETES MELLITUS WITHOUT COMPLICATION, WITHOUT LONG-TERM CURRENT USE OF INSULIN (HCC): ICD-10-CM

## 2022-10-19 PROCEDURE — G0009 ADMIN PNEUMOCOCCAL VACCINE: HCPCS | Performed by: INTERNAL MEDICINE

## 2022-10-19 PROCEDURE — 90677 PCV20 VACCINE IM: CPT | Performed by: INTERNAL MEDICINE

## 2022-10-19 PROCEDURE — 2022F DILAT RTA XM EVC RTNOPTHY: CPT | Performed by: INTERNAL MEDICINE

## 2022-10-19 PROCEDURE — G8427 DOCREV CUR MEDS BY ELIG CLIN: HCPCS | Performed by: INTERNAL MEDICINE

## 2022-10-19 PROCEDURE — 99214 OFFICE O/P EST MOD 30 MIN: CPT | Performed by: INTERNAL MEDICINE

## 2022-10-19 PROCEDURE — 3017F COLORECTAL CA SCREEN DOC REV: CPT | Performed by: INTERNAL MEDICINE

## 2022-10-19 PROCEDURE — G8484 FLU IMMUNIZE NO ADMIN: HCPCS | Performed by: INTERNAL MEDICINE

## 2022-10-19 PROCEDURE — 1036F TOBACCO NON-USER: CPT | Performed by: INTERNAL MEDICINE

## 2022-10-19 PROCEDURE — 3044F HG A1C LEVEL LT 7.0%: CPT | Performed by: INTERNAL MEDICINE

## 2022-10-19 PROCEDURE — G8417 CALC BMI ABV UP PARAM F/U: HCPCS | Performed by: INTERNAL MEDICINE

## 2022-10-19 PROCEDURE — 1124F ACP DISCUSS-NO DSCNMKR DOCD: CPT | Performed by: INTERNAL MEDICINE

## 2022-10-19 RX ORDER — VALSARTAN AND HYDROCHLOROTHIAZIDE 320; 25 MG/1; MG/1
1 TABLET, FILM COATED ORAL DAILY
Qty: 90 TABLET | Refills: 1 | Status: SHIPPED | OUTPATIENT
Start: 2022-10-19

## 2022-10-19 NOTE — PROGRESS NOTES
Cayla Westbrook (:  1955) is a 69 y.o. male,Established patient, here for evaluation of the following chief complaint(s):  Diabetes, Hypertension, Results (Lab results), and Immunizations (Pneumococcal )         ASSESSMENT/PLAN:  1. Essential hypertension  -Blood pressure is running pretty consistently above goal, increase valsartan-HCTZ to 320-25 mg daily  -     Comprehensive Metabolic Panel; Future  -     Lipid Panel; Future  -     CBC with Auto Differential; Future  -     PSA Screening; Future  -     Hemoglobin A1C; Future  -     Microalbumin / Creatinine Urine Ratio; Future  2. Type 2 diabetes mellitus without complication, without long-term current use of insulin (HCC)  -Controlled with diet and metformin, recheck in 6 months  -     Comprehensive Metabolic Panel; Future  -     Lipid Panel; Future  -     CBC with Auto Differential; Future  -     PSA Screening; Future  -     Hemoglobin A1C; Future  -     Microalbumin / Creatinine Urine Ratio; Future  3. Encounter for screening for malignant neoplasm of prostate  -     PSA Screening; Future  4. Need for pneumococcal vaccine  -     Pneumococcal, PCV20, PREVNAR 20, (age 25 yrs+), IM, PF    Return in about 6 months (around 2023) for AWV. Subjective   SUBJECTIVE/OBJECTIVE:  HPI    Hypertension -he is taking the blood pressure medication every day. No chest pain or shortness of breath. Denies any side effects. Type 2 diabetes -taking the metformin. He has been walking regularly, playing golf. Review of Systems       Objective   Physical Exam  Vitals reviewed. Constitutional:       General: He is not in acute distress. Appearance: Normal appearance. He is well-developed. HENT:      Head: Normocephalic and atraumatic. Cardiovascular:      Rate and Rhythm: Normal rate and regular rhythm. Heart sounds: Normal heart sounds. Pulmonary:      Effort: Pulmonary effort is normal. No respiratory distress.       Breath sounds: Normal Pt called, message per Dr given.   Verbalized understanding and compliance  New Rx escribed breath sounds. Skin:     General: Skin is warm and dry. Neurological:      Mental Status: He is alert. Psychiatric:         Mood and Affect: Mood normal.         Behavior: Behavior normal.         Thought Content: Thought content normal.         Judgment: Judgment normal.                An electronic signature was used to authenticate this note.     --Sarah Christopher MD

## 2022-11-02 ENCOUNTER — OFFICE VISIT (OUTPATIENT)
Dept: PULMONOLOGY | Age: 67
End: 2022-11-02
Payer: MEDICARE

## 2022-11-02 VITALS
HEART RATE: 78 BPM | SYSTOLIC BLOOD PRESSURE: 161 MMHG | BODY MASS INDEX: 33.6 KG/M2 | HEIGHT: 71 IN | OXYGEN SATURATION: 97 % | WEIGHT: 240 LBS | DIASTOLIC BLOOD PRESSURE: 90 MMHG

## 2022-11-02 DIAGNOSIS — G47.33 OSA (OBSTRUCTIVE SLEEP APNEA): Primary | ICD-10-CM

## 2022-11-02 PROCEDURE — 1124F ACP DISCUSS-NO DSCNMKR DOCD: CPT | Performed by: INTERNAL MEDICINE

## 2022-11-02 PROCEDURE — 3017F COLORECTAL CA SCREEN DOC REV: CPT | Performed by: INTERNAL MEDICINE

## 2022-11-02 PROCEDURE — G8427 DOCREV CUR MEDS BY ELIG CLIN: HCPCS | Performed by: INTERNAL MEDICINE

## 2022-11-02 PROCEDURE — 99214 OFFICE O/P EST MOD 30 MIN: CPT | Performed by: INTERNAL MEDICINE

## 2022-11-02 PROCEDURE — 3078F DIAST BP <80 MM HG: CPT | Performed by: INTERNAL MEDICINE

## 2022-11-02 PROCEDURE — G8484 FLU IMMUNIZE NO ADMIN: HCPCS | Performed by: INTERNAL MEDICINE

## 2022-11-02 PROCEDURE — 3074F SYST BP LT 130 MM HG: CPT | Performed by: INTERNAL MEDICINE

## 2022-11-02 PROCEDURE — 1036F TOBACCO NON-USER: CPT | Performed by: INTERNAL MEDICINE

## 2022-11-02 PROCEDURE — G8417 CALC BMI ABV UP PARAM F/U: HCPCS | Performed by: INTERNAL MEDICINE

## 2022-11-02 ASSESSMENT — ENCOUNTER SYMPTOMS
COUGH: 0
WHEEZING: 0
SHORTNESS OF BREATH: 0
CHEST TIGHTNESS: 0

## 2022-11-02 NOTE — PROGRESS NOTES
[facility] Pulmonary and Critical Care    Outpatient Note    Subjective:   CHIEF COMPLAINT:   Chief Complaint   Patient presents with    Follow-up     Interval history on 11/2/22  He is using CPAP regularly, but has difficulty have it one for over 4-5 hours due to nasal congestion      Interval history on 8/24/22  Wife checked his O2 sat at night while sleeping and it was 83%. He was worried that there is problem in the CPAP. He is here today to discuss this issue. Interval history on 3/10/22  He is currently using O2 at night. His O2 goes down to 88% at night. He has hx of snoring. He has occasional cough. Especially when eating and laying down. Leg edema is better. His usual bed time is 9:30 and rise time is 7:00  Sleep onset is usually 5 minutes  Number of wake ups 2. Number of nocturia 2. Activity before bedtime include watching TV. No TV in bedroom  There is occasional snoring, no witnessed apneas    He feel refreshed in the morning. During the daytime the patient is sometimes sleepy, and takes a nap. There is no morning headache. He drink a cup of coffee in am.  Occasionally drinks Pepsi. Rarely drinks a beer. ESS is 6      HPI:     The patient is 69 y.o. male who presents today for hospital follow up. He was admitted to the hospital on 1/24/22 with COVID19 pneumonia and discharged on 1/27/22  During this stay he required O2 between 4 to 10L via NC. He was treated with decadron and baricitinib. He was discharged on 6 liters. At this time there is no SOB. He is currently on 2 liters O2. Functional capacity is much better. Cough is also better. He did not lose taste and smell.       Past Medical History:    Past Medical History:   Diagnosis Date    COVID 1/24/2022    Diabetes mellitus ([address]     Gastroesophageal reflux disease without esophagitis     Hypertension     Pneumonia due to COVID-19 virus     Rash        Social History:    Social History Tobacco Use   Smoking Status Never   Smokeless Tobacco Never       Family History:  Family History   Problem Relation Age of Onset    Diabetes Mother     No Known Problems Father        Current Medications:  Current Outpatient Medications on File Prior to Visit   Medication Sig Dispense Refill    valsartan-hydroCHLOROthiazide (DIOVAN-HCT) 320-25 MG per tablet Take 1 tablet by mouth daily 90 tablet 1    omeprazole (PRILOSEC) 20 MG delayed release capsule TAKE ONE CAPSULE BY MOUTH EVERY MORNING BEFORE BREAKFAST 90 capsule 1    blood glucose monitor strips Test daily 50 strip 3    pravastatin (PRAVACHOL) 40 MG tablet Take 40 mg by mouth       metFORMIN (GLUCOPHAGE) 500 MG tablet Take 500 mg by mouth       No current facility-administered medications on file prior to visit. REVIEW OF SYSTEMS:    Review of Systems   Constitutional:  Negative for chills, fatigue, fever and unexpected weight change. HENT:  Negative for congestion. Respiratory:  Negative for cough, chest tightness, shortness of breath and wheezing. Cardiovascular:  Negative for chest pain, palpitations and leg swelling. Neurological:  Negative for weakness. Psychiatric/Behavioral:  The patient is not nervous/anxious. All other systems reviewed and are negative. Objective:   PHYSICAL EXAM:        VITALS:  Ht 5' 11\" (1.803 m)   Wt 240 lb (108.9 kg)   BMI 33.47 kg/m²     Physical Exam  Vitals reviewed. Constitutional:       Appearance: He is well-developed. HENT:      Head: Normocephalic and atraumatic. Eyes:      Pupils: Pupils are equal, round, and reactive to light. Neck:      Vascular: No JVD. Cardiovascular:      Rate and Rhythm: Normal rate and regular rhythm. Heart sounds: No murmur heard. No friction rub. No gallop. Pulmonary:      Effort: Pulmonary effort is normal. No respiratory distress. Breath sounds: Normal breath sounds. No stridor. No wheezing or rales.    Abdominal:      General: Bowel sounds are normal.      Palpations: Abdomen is soft. Musculoskeletal:         General: No deformity. Cervical back: Neck supple. Skin:     General: Skin is warm and dry. Neurological:      Mental Status: He is alert and oriented to person, place, and time. Psychiatric:         Behavior: Behavior normal.       DATA:    CT chest on 1/26/22     1. Large geographic areas of groundglass attenuation compatible with bilateral pneumonia. Imaging characteristics most consistent with Covid 19 pneumonia. 2. Small hiatal hernia. 3. Coronary artery calcification. 4. Reactive mediastinal lymph nodes. CXR on 1/24/22  Patchy multifocal consolidation similar to 1/20/2022. Elevated right hemidiaphragm. Stable borderline cardiomegaly. Assessment:      Diagnosis Orders   1. KATINA (obstructive sleep apnea)            Plan:   74year old male with KATINA. He was admitted to the hospital on 1/24 with COVID19 pneumonia and was discharged on 6 liters O2. Currently on room air. He was worked up for sleep apnea as wife noticed low SpO2 at night. ESS 6  He has hx of hypertension and hx of snoring. His BMI is 31  Neck size 18    Home sleep test with BROOKLYNN of 93 with lowest O2 sat of 93  2nd pulse ox study done on CPAP: ROMA was 21 with SpO2 <88 was 16.8 min with time consecutive <=88 was 2.3 min. PAP therapy is medically necessary due to severity of sleep apnea   Patient is clinically benefiting   Download report for the period from 10/2/22 to 10/31/22 show:  AutoPAP with 5-20 cmH2O  Usage days: 100%  Usage days days used >=4h: 4:50   AHI 1.1  Leaks (L/Min) 95th percentile: 11.1    Patient was encouraged to use PAP therapy at [facility] for 6-8hrs and during naps. (It should be used at least 4 hours daily and at least 70% of the time)      Patient was advised to clean the mask and humidification chamber and change the water daily. Tubing should be cleaned weekly.     Mask, tubing and filter change per  recommendations, and at least every 3-6 months. If there is significant leak or damage should change mask and supply sooner. Mask fitting and trial of different mask if whishes at [address] not to drive or operate heavy machinery if sleepy or fatigued. Complications of obstructive sleep apnea including HTN, cerebrovascular and cardiovascular events and motor vehicle accidents were also discussed    He is still worried about O2 level while asleep and get congested after using CPAP over 4 hours. Change tubing to heated tubing  Overnight pulse ox on CPAP, without O2.

## 2022-11-14 RX ORDER — VALSARTAN AND HYDROCHLOROTHIAZIDE 160; 12.5 MG/1; MG/1
TABLET, FILM COATED ORAL
Qty: 90 TABLET | Refills: 1 | OUTPATIENT
Start: 2022-11-14

## 2022-11-16 ENCOUNTER — TELEPHONE (OUTPATIENT)
Dept: PULMONOLOGY | Age: 67
End: 2022-11-16

## 2022-11-17 NOTE — TELEPHONE ENCOUNTER
Overnight pulse ox reviewed. Most of O2 saturations happened late (early morning). He usually put on his CPAP for 4+ hours. Likely these O2 desaturations while he is not on CPAP. I called him and he doesn't recall how long he used the CPAP during testing, however he confirmed to me he usually use it for about 4 hours.       Shima Aguilera can you please get a new data download    Thanks

## 2022-11-18 DIAGNOSIS — G47.33 OSA (OBSTRUCTIVE SLEEP APNEA): Primary | ICD-10-CM

## 2022-11-18 NOTE — TELEPHONE ENCOUNTER
Spouse calls asking does o2 need to be discontinued so DME can  tanks? Patel Gutierrez was unclear about results from overnight pulse ox  Also wants to know when does he need to come back for a follow up?    Sherri Blood [phone]

## 2022-11-18 NOTE — PROGRESS NOTES
Data download reviewed. He used his auto CPAP for the entire night he was doing overnight pulse ox and there is significant hypoxia recorded.       Will order sleep study with PAP titration

## 2022-11-30 ENCOUNTER — TELEPHONE (OUTPATIENT)
Dept: PULMONOLOGY | Age: 67
End: 2022-11-30

## 2022-12-13 ENCOUNTER — TELEPHONE (OUTPATIENT)
Dept: INTERNAL MEDICINE CLINIC | Age: 67
End: 2022-12-13

## 2022-12-13 NOTE — TELEPHONE ENCOUNTER
----- Message from Corie Face sent at 12/13/2022  3:35 PM EST -----  Subject: Message to Provider    QUESTIONS  Information for Provider? [employer] [facility] called in and said they wanted to know   when the pt last appt was or if he would have an appt coming up. They need   to obtain office viisit note. It needs to be between the dates of   10/23/22- 1/21/23 please fax over to [phone].  ---------------------------------------------------------------------------  --------------  [employer] [employer]  [phone]; OK to leave message on voicemail  ---------------------------------------------------------------------------  --------------  SCRIPT ANSWERS  Relationship to Patient? Third Party  Third Party Type? Durable Medical Equipment?    Representative Name? [employer]

## 2023-01-04 ENCOUNTER — TELEPHONE (OUTPATIENT)
Dept: INTERNAL MEDICINE CLINIC | Age: 68
End: 2023-01-04

## 2023-01-04 NOTE — TELEPHONE ENCOUNTER
LM for pt wife with advise.
Pt tested positive for covid yesterday.      Symptoms- runny nose, sore throat, headache      Would like medication for this       [address] [phone] - [facility], [place], operated by [facility] [phone]        Please advise
There is such experience with the medication that I'm not concerned about the EUA vs approval, it is more the potential for side effects with the medication (nausea, diarrhea, metallic taste) as well as the small but real possibility of symptom rebound after the end of the treatment with paxlovid. This can extend the illness by a few days, there is not a risk of serious illness but can feel pretty bad. Since he had vaccines within the last few months, it already lowers risk of severe illness so I do not feel strongly that he needs paxlovid, it is an option however. If symptoms are more like a mild to moderate cold, steroids aren't likely to help and might just raise his sugar. I would stick with the OTC cold medications for symptom relief.
Wife called back. She said since Paxlovid is not FDA approved, they are not sure if  they want him to take Paxlovid. Please ask Dr. Preston Marshall to call him back to discuss before prescribing Paxlovid - [phone]. When she had covid they gave her prednisone.
no

## 2023-02-02 ENCOUNTER — TELEPHONE (OUTPATIENT)
Dept: INTERNAL MEDICINE CLINIC | Age: 68
End: 2023-02-02

## 2023-02-02 NOTE — TELEPHONE ENCOUNTER
They are trying to do oxygen re-certification. Faxed document on 01/26/23. They are following up on this as they have not heard anything from us. Please look for fax and call them back.

## 2023-02-07 ENCOUNTER — HOSPITAL ENCOUNTER (OUTPATIENT)
Dept: SLEEP CENTER | Age: 68
Discharge: HOME | End: 2023-02-07
Payer: MEDICARE

## 2023-02-07 DIAGNOSIS — G47.33 OSA (OBSTRUCTIVE SLEEP APNEA): ICD-10-CM

## 2023-02-07 PROCEDURE — 95811 POLYSOM 6/>YRS CPAP 4/> PARM: CPT

## 2023-02-17 ENCOUNTER — TELEPHONE (OUTPATIENT)
Dept: PULMONOLOGY | Age: 68
End: 2023-02-17

## 2023-02-17 NOTE — TELEPHONE ENCOUNTER
Patient called about sleep results. I explained that we would need to change his CPAP over to an AutoBipap. Orders are written for Dr. Shima Claros to sign & I will send to [facility] to change.

## 2023-04-24 RX ORDER — VALSARTAN AND HYDROCHLOROTHIAZIDE 320; 25 MG/1; MG/1
TABLET, FILM COATED ORAL
Qty: 90 TABLET | Refills: 1 | Status: SHIPPED | OUTPATIENT
Start: 2023-04-24

## 2023-05-23 ENCOUNTER — OFFICE VISIT (OUTPATIENT)
Dept: PULMONOLOGY | Age: 68
End: 2023-05-23
Payer: MEDICARE

## 2023-05-23 VITALS
HEART RATE: 78 BPM | WEIGHT: 245 LBS | SYSTOLIC BLOOD PRESSURE: 134 MMHG | HEIGHT: 71 IN | BODY MASS INDEX: 34.3 KG/M2 | DIASTOLIC BLOOD PRESSURE: 88 MMHG | OXYGEN SATURATION: 97 %

## 2023-05-23 DIAGNOSIS — G47.33 OSA (OBSTRUCTIVE SLEEP APNEA): Primary | ICD-10-CM

## 2023-05-23 PROCEDURE — 3017F COLORECTAL CA SCREEN DOC REV: CPT | Performed by: INTERNAL MEDICINE

## 2023-05-23 PROCEDURE — G8417 CALC BMI ABV UP PARAM F/U: HCPCS | Performed by: INTERNAL MEDICINE

## 2023-05-23 PROCEDURE — 1124F ACP DISCUSS-NO DSCNMKR DOCD: CPT | Performed by: INTERNAL MEDICINE

## 2023-05-23 PROCEDURE — G8427 DOCREV CUR MEDS BY ELIG CLIN: HCPCS | Performed by: INTERNAL MEDICINE

## 2023-05-23 PROCEDURE — 3075F SYST BP GE 130 - 139MM HG: CPT | Performed by: INTERNAL MEDICINE

## 2023-05-23 PROCEDURE — 99214 OFFICE O/P EST MOD 30 MIN: CPT | Performed by: INTERNAL MEDICINE

## 2023-05-23 PROCEDURE — 3079F DIAST BP 80-89 MM HG: CPT | Performed by: INTERNAL MEDICINE

## 2023-05-23 PROCEDURE — 1036F TOBACCO NON-USER: CPT | Performed by: INTERNAL MEDICINE

## 2023-05-23 RX ORDER — PRAVASTATIN SODIUM 40 MG
40 TABLET ORAL DAILY
Qty: 30 TABLET | Refills: 5 | Status: SHIPPED | OUTPATIENT
Start: 2023-05-23

## 2023-05-23 ASSESSMENT — ENCOUNTER SYMPTOMS
WHEEZING: 0
SHORTNESS OF BREATH: 0
COUGH: 0
CHEST TIGHTNESS: 0

## 2023-05-23 NOTE — PROGRESS NOTES
[facility] Pulmonary and Critical Care    Outpatient Note    Subjective:   CHIEF COMPLAINT:   Chief Complaint   Patient presents with    1 Year Follow Up     Interval history on 5/23/23  He is using BiPAP at night regularly. He has heated tubing. It worked better. During the day he is rested. Interval history on 11/2/22  He is using CPAP regularly, but has difficulty have it one for over 4-5 hours due to nasal congestion      Interval history on 8/24/22  Wife checked his O2 sat at night while sleeping and it was 83%. He was worried that there is problem in the CPAP. He is here today to discuss this issue. Interval history on 3/10/22  He is currently using O2 at night. His O2 goes down to 88% at night. He has hx of snoring. He has occasional cough. Especially when eating and laying down. Leg edema is better. His usual bed time is 9:30 and rise time is 7:00  Sleep onset is usually 5 minutes  Number of wake ups 2. Number of nocturia 2. Activity before bedtime include watching TV. No TV in bedroom  There is occasional snoring, no witnessed apneas    He feel refreshed in the morning. During the daytime the patient is sometimes sleepy, and takes a nap. There is no morning headache. He drink a cup of coffee in am.  Occasionally drinks Pepsi. Rarely drinks a beer. ESS is 6      HPI:     The patient is 77 y.o. male who presents today for hospital follow up. He was admitted to the hospital on 1/24/22 with COVID19 pneumonia and discharged on 1/27/22  During this stay he required O2 between 4 to 10L via NC. He was treated with decadron and baricitinib. He was discharged on 6 liters. At this time there is no SOB. He is currently on 2 liters O2. Functional capacity is much better. Cough is also better. He did not lose taste and smell.       Past Medical History:    Past Medical History:   Diagnosis Date    COVID 1/24/2022    Diabetes mellitus ([address]

## 2023-06-01 DIAGNOSIS — E11.9 TYPE 2 DIABETES MELLITUS WITHOUT COMPLICATION, WITHOUT LONG-TERM CURRENT USE OF INSULIN (HCC): ICD-10-CM

## 2023-06-02 RX ORDER — BLOOD SUGAR DIAGNOSTIC
STRIP MISCELLANEOUS
Qty: 50 STRIP | Refills: 3 | Status: SHIPPED | OUTPATIENT
Start: 2023-06-02

## 2023-08-10 ENCOUNTER — TELEPHONE (OUTPATIENT)
Dept: INTERNAL MEDICINE CLINIC | Age: 68
End: 2023-08-10

## 2023-08-10 NOTE — TELEPHONE ENCOUNTER
States that they faxed over a request X 2 for office notes to be faxed for the DM supplies. They ae wanting to know if the paper has been received? If so, please send what is being requested.

## 2023-08-14 RX ORDER — PRAVASTATIN SODIUM 40 MG
40 TABLET ORAL DAILY
Qty: 90 TABLET | Refills: 3 | Status: SHIPPED | OUTPATIENT
Start: 2023-08-14

## 2023-09-14 DIAGNOSIS — Z12.5 ENCOUNTER FOR SCREENING FOR MALIGNANT NEOPLASM OF PROSTATE: ICD-10-CM

## 2023-09-14 DIAGNOSIS — I10 ESSENTIAL HYPERTENSION: ICD-10-CM

## 2023-09-14 DIAGNOSIS — E11.9 TYPE 2 DIABETES MELLITUS WITHOUT COMPLICATION, WITHOUT LONG-TERM CURRENT USE OF INSULIN (HCC): ICD-10-CM

## 2023-09-14 LAB
ALBUMIN SERPL-MCNC: 4.5 G/DL (ref 3.4–5)
ALBUMIN/GLOB SERPL: 1.7 {RATIO} (ref 1.1–2.2)
ALP SERPL-CCNC: 71 U/L (ref 40–129)
ALT SERPL-CCNC: 35 U/L (ref 10–40)
ANION GAP SERPL CALCULATED.3IONS-SCNC: 13 MMOL/L (ref 3–16)
AST SERPL-CCNC: 30 U/L (ref 15–37)
BASOPHILS # BLD: 0.1 K/UL (ref 0–0.2)
BASOPHILS NFR BLD: 0.7 %
BILIRUB SERPL-MCNC: 0.9 MG/DL (ref 0–1)
BUN SERPL-MCNC: 20 MG/DL (ref 7–20)
CALCIUM SERPL-MCNC: 9.7 MG/DL (ref 8.3–10.6)
CHLORIDE SERPL-SCNC: 98 MMOL/L (ref 99–110)
CHOLEST SERPL-MCNC: 164 MG/DL (ref 0–199)
CO2 SERPL-SCNC: 30 MMOL/L (ref 21–32)
CREAT SERPL-MCNC: 1 MG/DL (ref 0.8–1.3)
CREAT UR-MCNC: 265.1 MG/DL (ref 39–259)
DEPRECATED RDW RBC AUTO: 14.1 % (ref 12.4–15.4)
EOSINOPHIL # BLD: 0.4 K/UL (ref 0–0.6)
EOSINOPHIL NFR BLD: 4.7 %
GFR SERPLBLD CREATININE-BSD FMLA CKD-EPI: >60 ML/MIN/{1.73_M2}
GLUCOSE SERPL-MCNC: 118 MG/DL (ref 70–99)
HCT VFR BLD AUTO: 47.5 % (ref 40.5–52.5)
HDLC SERPL-MCNC: 39 MG/DL (ref 40–60)
HGB BLD-MCNC: 15.9 G/DL (ref 13.5–17.5)
LDLC SERPL CALC-MCNC: 101 MG/DL
LYMPHOCYTES # BLD: 2.7 K/UL (ref 1–5.1)
LYMPHOCYTES NFR BLD: 30.9 %
MCH RBC QN AUTO: 31.2 PG (ref 26–34)
MCHC RBC AUTO-ENTMCNC: 33.5 G/DL (ref 31–36)
MCV RBC AUTO: 93 FL (ref 80–100)
MICROALBUMIN UR DL<=1MG/L-MCNC: <1.2 MG/DL
MICROALBUMIN/CREAT UR: ABNORMAL MG/G (ref 0–30)
MONOCYTES # BLD: 0.7 K/UL (ref 0–1.3)
MONOCYTES NFR BLD: 7.7 %
NEUTROPHILS # BLD: 5 K/UL (ref 1.7–7.7)
NEUTROPHILS NFR BLD: 56 %
PLATELET # BLD AUTO: 244 K/UL (ref 135–450)
PMV BLD AUTO: 8.3 FL (ref 5–10.5)
POTASSIUM SERPL-SCNC: 3.9 MMOL/L (ref 3.5–5.1)
PROT SERPL-MCNC: 7.2 G/DL (ref 6.4–8.2)
PSA SERPL DL<=0.01 NG/ML-MCNC: 1.94 NG/ML (ref 0–4)
RBC # BLD AUTO: 5.1 M/UL (ref 4.2–5.9)
SODIUM SERPL-SCNC: 141 MMOL/L (ref 136–145)
TRIGL SERPL-MCNC: 121 MG/DL (ref 0–150)
VLDLC SERPL CALC-MCNC: 24 MG/DL
WBC # BLD AUTO: 8.9 K/UL (ref 4–11)

## 2023-09-15 LAB
EST. AVERAGE GLUCOSE BLD GHB EST-MCNC: 154.2 MG/DL
HBA1C MFR BLD: 7 %

## 2023-09-21 ENCOUNTER — OFFICE VISIT (OUTPATIENT)
Dept: INTERNAL MEDICINE CLINIC | Age: 68
End: 2023-09-21

## 2023-09-21 VITALS
HEART RATE: 65 BPM | DIASTOLIC BLOOD PRESSURE: 82 MMHG | OXYGEN SATURATION: 99 % | SYSTOLIC BLOOD PRESSURE: 134 MMHG | HEIGHT: 70 IN | TEMPERATURE: 98.6 F | BODY MASS INDEX: 35.79 KG/M2 | WEIGHT: 250 LBS

## 2023-09-21 DIAGNOSIS — K21.9 GASTROESOPHAGEAL REFLUX DISEASE WITHOUT ESOPHAGITIS: ICD-10-CM

## 2023-09-21 DIAGNOSIS — I10 ESSENTIAL HYPERTENSION: ICD-10-CM

## 2023-09-21 DIAGNOSIS — Z00.00 MEDICARE ANNUAL WELLNESS VISIT, SUBSEQUENT: Primary | ICD-10-CM

## 2023-09-21 DIAGNOSIS — E66.01 SEVERE OBESITY (BMI 35.0-39.9) WITH COMORBIDITY (HCC): ICD-10-CM

## 2023-09-21 DIAGNOSIS — E11.9 TYPE 2 DIABETES MELLITUS WITHOUT COMPLICATION, WITHOUT LONG-TERM CURRENT USE OF INSULIN (HCC): ICD-10-CM

## 2023-09-21 RX ORDER — FAMOTIDINE 20 MG/1
20 TABLET, FILM COATED ORAL 2 TIMES DAILY PRN
COMMUNITY

## 2023-09-21 SDOH — ECONOMIC STABILITY: HOUSING INSECURITY
IN THE LAST 12 MONTHS, WAS THERE A TIME WHEN YOU DID NOT HAVE A STEADY PLACE TO SLEEP OR SLEPT IN A SHELTER (INCLUDING NOW)?: NO

## 2023-09-21 SDOH — ECONOMIC STABILITY: INCOME INSECURITY: HOW HARD IS IT FOR YOU TO PAY FOR THE VERY BASICS LIKE FOOD, HOUSING, MEDICAL CARE, AND HEATING?: NOT HARD AT ALL

## 2023-09-21 SDOH — ECONOMIC STABILITY: FOOD INSECURITY: WITHIN THE PAST 12 MONTHS, YOU WORRIED THAT YOUR FOOD WOULD RUN OUT BEFORE YOU GOT MONEY TO BUY MORE.: NEVER TRUE

## 2023-09-21 SDOH — ECONOMIC STABILITY: FOOD INSECURITY: WITHIN THE PAST 12 MONTHS, THE FOOD YOU BOUGHT JUST DIDN'T LAST AND YOU DIDN'T HAVE MONEY TO GET MORE.: NEVER TRUE

## 2023-09-21 ASSESSMENT — PATIENT HEALTH QUESTIONNAIRE - PHQ9
SUM OF ALL RESPONSES TO PHQ QUESTIONS 1-9: 0
SUM OF ALL RESPONSES TO PHQ9 QUESTIONS 1 & 2: 0
SUM OF ALL RESPONSES TO PHQ QUESTIONS 1-9: 0
2. FEELING DOWN, DEPRESSED OR HOPELESS: 0
1. LITTLE INTEREST OR PLEASURE IN DOING THINGS: 0

## 2023-09-21 NOTE — PROGRESS NOTES
Medicare Annual Wellness Visit    Figueroa Rossi is here for Medicare AWV    Assessment & Plan   Medicare annual wellness visit, subsequent  Type 2 diabetes mellitus without complication, without long-term current use of insulin ([address])  -Controlled, would continue metformin 500 mg XR daily. Discussed alternatives, really would end up being the newer medications so for right now we will stick with metformin  -     Comprehensive Metabolic Panel; Future  -     Lipid Panel; Future  -     CBC with Auto Differential; Future  -     Hemoglobin A1C; Future  Essential hypertension   -Controlled, continue valsartan-HCTZ 320-25 mg daily  Severe obesity (BMI 35.0-39. 9) with comorbidity ([address])   -Discussed working on weight loss  Gastroesophageal reflux disease without esophagitis   -Stable, monitor off medication  Recommendations for Preventive Services Due: see orders and patient instructions/AVS.  Recommended screening schedule for the next 5-10 years is provided to the patient in written form: see Patient Instructions/AVS.     Return in about 6 months (around 3/21/2024) for DM follow up. Subjective   The following acute and/or chronic problems were also addressed today:    Has some side effects with metformin - had to cut back to 500mg XR    Patient's complete Health Risk Assessment and screening values have been reviewed and are found in Flowsheets. The following problems were reviewed today and where indicated follow up appointments were made and/or referrals ordered.     Positive Risk Factor Screenings with Interventions:                 Weight and Activity:  Physical Activity: Insufficiently Active (9/21/2023)    Exercise Vital Sign     Days of Exercise per Week: 2 days     Minutes of Exercise per Session: 20 min     On average, how many days per week do you engage in moderate to strenuous exercise (like a brisk walk)?: 2 days  Have you lost any weight without trying in the past 3 months?: No  Body mass index is 36.39

## 2023-09-21 NOTE — PATIENT INSTRUCTIONS
Learning About Vision Tests  What are vision tests? The four most common vision tests are visual acuity tests, refraction, visual field tests, and color vision tests. Visual acuity (sharpness) tests  These tests are used: To see if you need glasses or contact lenses. To monitor an eye problem. To check an eye injury. Visual acuity tests are done as part of routine exams. You may also have this test when you get your 's license or apply for some types of jobs. Visual field tests  These tests are used: To check for vision loss in any area of your range of vision. To screen for certain eye diseases. To look for nerve damage after a stroke, head injury, or other problem that could reduce blood flow to the brain. Refraction and color tests  A refraction test is done to find the right prescription for glasses and contact lenses. A color vision test is done to check for color blindness. Color vision is often tested as part of a routine exam. You may also have this test when you apply for a job where recognizing different colors is important, such as , electronics, or the [employer]. How are vision tests done? Visual acuity test   You cover one eye at a time. You read aloud from a wall chart across the room. You read aloud from a small card that you hold in your hand. Refraction   You look into a special device. The device puts lenses of different strengths in front of each eye to see how strong your glasses or contact lenses need to be. Visual field tests   Your doctor may have you look through special machines. Or your doctor may simply have you stare straight ahead while they move a finger into and out of your field of vision. Color vision test   You look at pieces of printed test patterns in various colors. You say what number or symbol you see. Your doctor may have you trace the number or symbol using a pointer. How do these tests feel?   There is very little chance of

## 2023-10-20 RX ORDER — VALSARTAN AND HYDROCHLOROTHIAZIDE 320; 25 MG/1; MG/1
TABLET, FILM COATED ORAL
Qty: 90 TABLET | Refills: 1 | Status: SHIPPED | OUTPATIENT
Start: 2023-10-20

## 2024-03-21 DIAGNOSIS — E11.9 TYPE 2 DIABETES MELLITUS WITHOUT COMPLICATION, WITHOUT LONG-TERM CURRENT USE OF INSULIN (HCC): ICD-10-CM

## 2024-03-21 LAB
ALBUMIN SERPL-MCNC: 4.4 G/DL (ref 3.4–5)
ALBUMIN/GLOB SERPL: 1.8 {RATIO} (ref 1.1–2.2)
ALP SERPL-CCNC: 76 U/L (ref 40–129)
ALT SERPL-CCNC: 29 U/L (ref 10–40)
ANION GAP SERPL CALCULATED.3IONS-SCNC: 10 MMOL/L (ref 3–16)
AST SERPL-CCNC: 21 U/L (ref 15–37)
BASOPHILS # BLD: 0 K/UL (ref 0–0.2)
BASOPHILS NFR BLD: 0.5 %
BILIRUB SERPL-MCNC: 0.6 MG/DL (ref 0–1)
BUN SERPL-MCNC: 15 MG/DL (ref 7–20)
CALCIUM SERPL-MCNC: 9.7 MG/DL (ref 8.3–10.6)
CHLORIDE SERPL-SCNC: 100 MMOL/L (ref 99–110)
CHOLEST SERPL-MCNC: 177 MG/DL (ref 0–199)
CO2 SERPL-SCNC: 30 MMOL/L (ref 21–32)
CREAT SERPL-MCNC: 0.9 MG/DL (ref 0.8–1.3)
DEPRECATED RDW RBC AUTO: 14.1 % (ref 12.4–15.4)
EOSINOPHIL # BLD: 0.5 K/UL (ref 0–0.6)
EOSINOPHIL NFR BLD: 6.3 %
GFR SERPLBLD CREATININE-BSD FMLA CKD-EPI: >60 ML/MIN/{1.73_M2}
GLUCOSE SERPL-MCNC: 155 MG/DL (ref 70–99)
HCT VFR BLD AUTO: 43.5 % (ref 40.5–52.5)
HDLC SERPL-MCNC: 40 MG/DL (ref 40–60)
HGB BLD-MCNC: 15.2 G/DL (ref 13.5–17.5)
LDLC SERPL CALC-MCNC: 112 MG/DL
LYMPHOCYTES # BLD: 2.8 K/UL (ref 1–5.1)
LYMPHOCYTES NFR BLD: 32.5 %
MCH RBC QN AUTO: 31.1 PG (ref 26–34)
MCHC RBC AUTO-ENTMCNC: 34.9 G/DL (ref 31–36)
MCV RBC AUTO: 89 FL (ref 80–100)
MONOCYTES # BLD: 0.7 K/UL (ref 0–1.3)
MONOCYTES NFR BLD: 8 %
NEUTROPHILS # BLD: 4.5 K/UL (ref 1.7–7.7)
NEUTROPHILS NFR BLD: 52.7 %
PLATELET # BLD AUTO: 229 K/UL (ref 135–450)
PMV BLD AUTO: 8.5 FL (ref 5–10.5)
POTASSIUM SERPL-SCNC: 4.1 MMOL/L (ref 3.5–5.1)
PROT SERPL-MCNC: 6.8 G/DL (ref 6.4–8.2)
RBC # BLD AUTO: 4.88 M/UL (ref 4.2–5.9)
SODIUM SERPL-SCNC: 140 MMOL/L (ref 136–145)
TRIGL SERPL-MCNC: 124 MG/DL (ref 0–150)
VLDLC SERPL CALC-MCNC: 25 MG/DL
WBC # BLD AUTO: 8.5 K/UL (ref 4–11)

## 2024-03-22 LAB
EST. AVERAGE GLUCOSE BLD GHB EST-MCNC: 182.9 MG/DL
HBA1C MFR BLD: 8 %

## 2024-04-01 ENCOUNTER — OFFICE VISIT (OUTPATIENT)
Dept: INTERNAL MEDICINE CLINIC | Age: 69
End: 2024-04-01
Payer: MEDICARE

## 2024-04-01 VITALS
DIASTOLIC BLOOD PRESSURE: 74 MMHG | BODY MASS INDEX: 37.77 KG/M2 | WEIGHT: 255 LBS | SYSTOLIC BLOOD PRESSURE: 138 MMHG | HEIGHT: 69 IN

## 2024-04-01 DIAGNOSIS — E11.65 TYPE 2 DIABETES MELLITUS WITH HYPERGLYCEMIA, WITHOUT LONG-TERM CURRENT USE OF INSULIN (HCC): Primary | ICD-10-CM

## 2024-04-01 DIAGNOSIS — I10 ESSENTIAL HYPERTENSION: ICD-10-CM

## 2024-04-01 PROBLEM — E11.9 TYPE 2 DIABETES MELLITUS WITHOUT COMPLICATION, WITHOUT LONG-TERM CURRENT USE OF INSULIN (HCC): Status: RESOLVED | Noted: 2021-11-23 | Resolved: 2024-04-01

## 2024-04-01 PROCEDURE — G8417 CALC BMI ABV UP PARAM F/U: HCPCS | Performed by: INTERNAL MEDICINE

## 2024-04-01 PROCEDURE — 2022F DILAT RTA XM EVC RTNOPTHY: CPT | Performed by: INTERNAL MEDICINE

## 2024-04-01 PROCEDURE — 3075F SYST BP GE 130 - 139MM HG: CPT | Performed by: INTERNAL MEDICINE

## 2024-04-01 PROCEDURE — 99214 OFFICE O/P EST MOD 30 MIN: CPT | Performed by: INTERNAL MEDICINE

## 2024-04-01 PROCEDURE — 1124F ACP DISCUSS-NO DSCNMKR DOCD: CPT | Performed by: INTERNAL MEDICINE

## 2024-04-01 PROCEDURE — 1036F TOBACCO NON-USER: CPT | Performed by: INTERNAL MEDICINE

## 2024-04-01 PROCEDURE — 3078F DIAST BP <80 MM HG: CPT | Performed by: INTERNAL MEDICINE

## 2024-04-01 PROCEDURE — 3052F HG A1C>EQUAL 8.0%<EQUAL 9.0%: CPT | Performed by: INTERNAL MEDICINE

## 2024-04-01 PROCEDURE — G8427 DOCREV CUR MEDS BY ELIG CLIN: HCPCS | Performed by: INTERNAL MEDICINE

## 2024-04-01 PROCEDURE — 3017F COLORECTAL CA SCREEN DOC REV: CPT | Performed by: INTERNAL MEDICINE

## 2024-04-01 RX ORDER — OMEPRAZOLE 20 MG/1
20 CAPSULE, DELAYED RELEASE ORAL DAILY
COMMUNITY

## 2024-04-01 ASSESSMENT — PATIENT HEALTH QUESTIONNAIRE - PHQ9
SUM OF ALL RESPONSES TO PHQ QUESTIONS 1-9: 0
1. LITTLE INTEREST OR PLEASURE IN DOING THINGS: NOT AT ALL
SUM OF ALL RESPONSES TO PHQ QUESTIONS 1-9: 0
SUM OF ALL RESPONSES TO PHQ QUESTIONS 1-9: 0
2. FEELING DOWN, DEPRESSED OR HOPELESS: NOT AT ALL
SUM OF ALL RESPONSES TO PHQ QUESTIONS 1-9: 0
SUM OF ALL RESPONSES TO PHQ9 QUESTIONS 1 & 2: 0

## 2024-04-01 NOTE — PROGRESS NOTES
Jan Devine (:  1955) is a 68 y.o. male,Established patient, here for evaluation of the following chief complaint(s):  Hypertension and Diabetes         ASSESSMENT/PLAN:  1. Type 2 diabetes mellitus with hyperglycemia, without long-term current use of insulin (HCC)  -Recommend resuming metformin, would start with extended release 1 tab per day then increase to 2 tabs per day after 1 week.  He will also make some adjustments to diet, cut the soda out and decrease carbs.  Would recheck A1c in 3  -     Comprehensive Metabolic Panel; Future  -     Lipid Panel; Future  -     CBC with Auto Differential; Future  -     Hemoglobin A1C; Future  2. Essential hypertension   -Has improved, systolic is a little higher than I would like to be but he will work on weight and exercise as well as dietary changes as above, continue valsartan-HCTZ 320-25 mg daily    Return in about 3 months (around 2024) for DM follow up.         Subjective   SUBJECTIVE/OBJECTIVE:  HPI    He has not been taking metformin regularly, maybe once a week.  He has some diarrhea with it, more concerned about potential cancer risk which he has read about on the Internet.  He does drink a soda at dinner, other room to improve diet.    He is taking the valsartan-HCTZ, he has noticed his diastolic blood pressure is down from where it used to be.    He had a cyst on his wrist that had been there for over a year but now it seems to be gone    Review of Systems       Objective   Physical Exam  Vitals reviewed.   Constitutional:       General: He is not in acute distress.     Appearance: Normal appearance. He is well-developed.   HENT:      Head: Normocephalic and atraumatic.   Cardiovascular:      Rate and Rhythm: Normal rate and regular rhythm.      Heart sounds: Normal heart sounds.   Pulmonary:      Effort: Pulmonary effort is normal. No respiratory distress.      Breath sounds: Normal breath sounds.   Skin:     General: Skin is warm and dry.

## 2024-05-20 RX ORDER — VALSARTAN AND HYDROCHLOROTHIAZIDE 320; 25 MG/1; MG/1
1 TABLET, FILM COATED ORAL DAILY
Qty: 90 TABLET | Refills: 1 | Status: SHIPPED | OUTPATIENT
Start: 2024-05-20

## 2024-06-06 DIAGNOSIS — E11.65 TYPE 2 DIABETES MELLITUS WITH HYPERGLYCEMIA, WITHOUT LONG-TERM CURRENT USE OF INSULIN (HCC): ICD-10-CM

## 2024-06-06 LAB
ALBUMIN SERPL-MCNC: 4.2 G/DL (ref 3.4–5)
ALBUMIN/GLOB SERPL: 1.5 {RATIO} (ref 1.1–2.2)
ALP SERPL-CCNC: 77 U/L (ref 40–129)
ALT SERPL-CCNC: 32 U/L (ref 10–40)
ANION GAP SERPL CALCULATED.3IONS-SCNC: 10 MMOL/L (ref 3–16)
AST SERPL-CCNC: 24 U/L (ref 15–37)
BASOPHILS # BLD: 0.1 K/UL (ref 0–0.2)
BASOPHILS NFR BLD: 0.6 %
BILIRUB SERPL-MCNC: 0.4 MG/DL (ref 0–1)
BUN SERPL-MCNC: 23 MG/DL (ref 7–20)
CALCIUM SERPL-MCNC: 9.9 MG/DL (ref 8.3–10.6)
CHLORIDE SERPL-SCNC: 100 MMOL/L (ref 99–110)
CHOLEST SERPL-MCNC: 165 MG/DL (ref 0–199)
CO2 SERPL-SCNC: 30 MMOL/L (ref 21–32)
CREAT SERPL-MCNC: 1 MG/DL (ref 0.8–1.3)
DEPRECATED RDW RBC AUTO: 14.1 % (ref 12.4–15.4)
EOSINOPHIL # BLD: 0.4 K/UL (ref 0–0.6)
EOSINOPHIL NFR BLD: 4.4 %
GFR SERPLBLD CREATININE-BSD FMLA CKD-EPI: 80 ML/MIN/{1.73_M2}
GLUCOSE SERPL-MCNC: 162 MG/DL (ref 70–99)
HCT VFR BLD AUTO: 44.1 % (ref 40.5–52.5)
HDLC SERPL-MCNC: 39 MG/DL (ref 40–60)
HGB BLD-MCNC: 15.1 G/DL (ref 13.5–17.5)
LDLC SERPL CALC-MCNC: 100 MG/DL
LYMPHOCYTES # BLD: 3.1 K/UL (ref 1–5.1)
LYMPHOCYTES NFR BLD: 34.5 %
MCH RBC QN AUTO: 30.9 PG (ref 26–34)
MCHC RBC AUTO-ENTMCNC: 34.1 G/DL (ref 31–36)
MCV RBC AUTO: 90.7 FL (ref 80–100)
MONOCYTES # BLD: 0.5 K/UL (ref 0–1.3)
MONOCYTES NFR BLD: 6 %
NEUTROPHILS # BLD: 4.9 K/UL (ref 1.7–7.7)
NEUTROPHILS NFR BLD: 54.5 %
PLATELET # BLD AUTO: 254 K/UL (ref 135–450)
PMV BLD AUTO: 8.2 FL (ref 5–10.5)
POTASSIUM SERPL-SCNC: 4 MMOL/L (ref 3.5–5.1)
PROT SERPL-MCNC: 7 G/DL (ref 6.4–8.2)
RBC # BLD AUTO: 4.87 M/UL (ref 4.2–5.9)
SODIUM SERPL-SCNC: 140 MMOL/L (ref 136–145)
TRIGL SERPL-MCNC: 132 MG/DL (ref 0–150)
VLDLC SERPL CALC-MCNC: 26 MG/DL
WBC # BLD AUTO: 8.9 K/UL (ref 4–11)

## 2024-06-07 LAB
EST. AVERAGE GLUCOSE BLD GHB EST-MCNC: 188.6 MG/DL
HBA1C MFR BLD: 8.2 %

## 2024-06-25 ENCOUNTER — OFFICE VISIT (OUTPATIENT)
Dept: INTERNAL MEDICINE CLINIC | Age: 69
End: 2024-06-25
Payer: MEDICARE

## 2024-06-25 VITALS
SYSTOLIC BLOOD PRESSURE: 130 MMHG | WEIGHT: 251 LBS | HEIGHT: 69 IN | BODY MASS INDEX: 37.18 KG/M2 | DIASTOLIC BLOOD PRESSURE: 72 MMHG

## 2024-06-25 DIAGNOSIS — I10 ESSENTIAL HYPERTENSION: ICD-10-CM

## 2024-06-25 DIAGNOSIS — Z12.5 ENCOUNTER FOR SCREENING FOR MALIGNANT NEOPLASM OF PROSTATE: ICD-10-CM

## 2024-06-25 DIAGNOSIS — E11.65 TYPE 2 DIABETES MELLITUS WITH HYPERGLYCEMIA, WITHOUT LONG-TERM CURRENT USE OF INSULIN (HCC): Primary | ICD-10-CM

## 2024-06-25 PROCEDURE — 3078F DIAST BP <80 MM HG: CPT | Performed by: INTERNAL MEDICINE

## 2024-06-25 PROCEDURE — 99214 OFFICE O/P EST MOD 30 MIN: CPT | Performed by: INTERNAL MEDICINE

## 2024-06-25 PROCEDURE — 3052F HG A1C>EQUAL 8.0%<EQUAL 9.0%: CPT | Performed by: INTERNAL MEDICINE

## 2024-06-25 PROCEDURE — 1124F ACP DISCUSS-NO DSCNMKR DOCD: CPT | Performed by: INTERNAL MEDICINE

## 2024-06-25 PROCEDURE — 3017F COLORECTAL CA SCREEN DOC REV: CPT | Performed by: INTERNAL MEDICINE

## 2024-06-25 PROCEDURE — 3075F SYST BP GE 130 - 139MM HG: CPT | Performed by: INTERNAL MEDICINE

## 2024-06-25 PROCEDURE — G8427 DOCREV CUR MEDS BY ELIG CLIN: HCPCS | Performed by: INTERNAL MEDICINE

## 2024-06-25 PROCEDURE — 2022F DILAT RTA XM EVC RTNOPTHY: CPT | Performed by: INTERNAL MEDICINE

## 2024-06-25 PROCEDURE — G8417 CALC BMI ABV UP PARAM F/U: HCPCS | Performed by: INTERNAL MEDICINE

## 2024-06-25 PROCEDURE — 1036F TOBACCO NON-USER: CPT | Performed by: INTERNAL MEDICINE

## 2024-06-25 RX ORDER — DAPAGLIFLOZIN 10 MG/1
10 TABLET, FILM COATED ORAL EVERY MORNING
Qty: 90 TABLET | Refills: 1 | Status: SHIPPED | OUTPATIENT
Start: 2024-06-25

## 2024-06-25 NOTE — PROGRESS NOTES
Judgment: Judgment normal.                  An electronic signature was used to authenticate this note.    --Domitila Leavitt MD    Statement Selected

## 2024-07-23 ENCOUNTER — OFFICE VISIT (OUTPATIENT)
Dept: PULMONOLOGY | Age: 69
End: 2024-07-23
Payer: MEDICARE

## 2024-07-23 VITALS
SYSTOLIC BLOOD PRESSURE: 136 MMHG | OXYGEN SATURATION: 96 % | DIASTOLIC BLOOD PRESSURE: 82 MMHG | HEART RATE: 70 BPM | WEIGHT: 250 LBS | BODY MASS INDEX: 35 KG/M2 | HEIGHT: 71 IN

## 2024-07-23 DIAGNOSIS — G47.33 OSA (OBSTRUCTIVE SLEEP APNEA): Primary | ICD-10-CM

## 2024-07-23 PROCEDURE — 3075F SYST BP GE 130 - 139MM HG: CPT | Performed by: INTERNAL MEDICINE

## 2024-07-23 PROCEDURE — G8417 CALC BMI ABV UP PARAM F/U: HCPCS | Performed by: INTERNAL MEDICINE

## 2024-07-23 PROCEDURE — 3079F DIAST BP 80-89 MM HG: CPT | Performed by: INTERNAL MEDICINE

## 2024-07-23 PROCEDURE — 3017F COLORECTAL CA SCREEN DOC REV: CPT | Performed by: INTERNAL MEDICINE

## 2024-07-23 PROCEDURE — 1036F TOBACCO NON-USER: CPT | Performed by: INTERNAL MEDICINE

## 2024-07-23 PROCEDURE — 1124F ACP DISCUSS-NO DSCNMKR DOCD: CPT | Performed by: INTERNAL MEDICINE

## 2024-07-23 PROCEDURE — 99214 OFFICE O/P EST MOD 30 MIN: CPT | Performed by: INTERNAL MEDICINE

## 2024-07-23 PROCEDURE — G8427 DOCREV CUR MEDS BY ELIG CLIN: HCPCS | Performed by: INTERNAL MEDICINE

## 2024-07-23 ASSESSMENT — ENCOUNTER SYMPTOMS
SHORTNESS OF BREATH: 0
COUGH: 0
WHEEZING: 0
CHEST TIGHTNESS: 0

## 2024-07-23 NOTE — PROGRESS NOTES
initially started on CPAP however he continued to have hypoxia.    Failed CPAP titration and was switched to BiPAP    PAP therapy is medically necessary due to severity of sleep apnea   Patient is clinically benefiting   Download report for the period from 6/23/24 to 7/22/24:  Auto BiLevel  Usage days: 100%  Usage days (days used) 6:35  AHI 0.5  Leaks (L/Min) 95th percentile:39    Patient was encouraged to use PAP therapy at [facility] for 6-8hrs and during naps.  (It should be used at least 4 hours daily and at least 70% of the time)      Patient was advised to clean the mask and humidification chamber and change the water daily.  Tubing should be cleaned weekly.    Mask, tubing and filter change per  recommendations, and at least every 3-6 months.  If there is significant leak or damage should change mask and supply sooner.      Mask fitting and trial of different mask if whishes at DME company    Complications of obstructive sleep apnea including HTN, cerebrovascular and cardiovascular events and motor vehicle accidents were also discussed    Counseled to lose wt  Advised to eat no more than 4-5 times daily, avoid high fat and high sugar foods, include protein with all meals and snacks, avoid soda and calorie containing beverages.    Advised to exercise regularly, at least 30 min a day.    Pt and wife are motivated to lose wt.  Target wt 190 (BMI of 27)

## 2024-08-19 RX ORDER — PRAVASTATIN SODIUM 40 MG
40 TABLET ORAL DAILY
Qty: 90 TABLET | Refills: 3 | OUTPATIENT
Start: 2024-08-19

## 2024-08-28 RX ORDER — PRAVASTATIN SODIUM 40 MG
40 TABLET ORAL DAILY
Qty: 90 TABLET | Refills: 3 | Status: SHIPPED | OUTPATIENT
Start: 2024-08-28

## 2024-09-16 DIAGNOSIS — E11.65 TYPE 2 DIABETES MELLITUS WITH HYPERGLYCEMIA, WITHOUT LONG-TERM CURRENT USE OF INSULIN (HCC): ICD-10-CM

## 2024-09-16 DIAGNOSIS — Z12.5 ENCOUNTER FOR SCREENING FOR MALIGNANT NEOPLASM OF PROSTATE: ICD-10-CM

## 2024-09-16 LAB
ALBUMIN SERPL-MCNC: 3.9 G/DL (ref 3.4–5)
ALBUMIN/GLOB SERPL: 1.6 {RATIO} (ref 1.1–2.2)
ALP SERPL-CCNC: 83 U/L (ref 40–129)
ALT SERPL-CCNC: 30 U/L (ref 10–40)
ANION GAP SERPL CALCULATED.3IONS-SCNC: 9 MMOL/L (ref 3–16)
AST SERPL-CCNC: 22 U/L (ref 15–37)
BASOPHILS # BLD: 0.1 K/UL (ref 0–0.2)
BASOPHILS NFR BLD: 0.6 %
BILIRUB SERPL-MCNC: 0.3 MG/DL (ref 0–1)
BUN SERPL-MCNC: 21 MG/DL (ref 7–20)
CALCIUM SERPL-MCNC: 9.5 MG/DL (ref 8.3–10.6)
CHLORIDE SERPL-SCNC: 100 MMOL/L (ref 99–110)
CHOLEST SERPL-MCNC: 147 MG/DL (ref 0–199)
CO2 SERPL-SCNC: 31 MMOL/L (ref 21–32)
CREAT SERPL-MCNC: 1 MG/DL (ref 0.8–1.3)
CREAT UR-MCNC: 233 MG/DL (ref 39–259)
DEPRECATED RDW RBC AUTO: 13.9 % (ref 12.4–15.4)
EOSINOPHIL # BLD: 0.4 K/UL (ref 0–0.6)
EOSINOPHIL NFR BLD: 4.9 %
EST. AVERAGE GLUCOSE BLD GHB EST-MCNC: 171.4 MG/DL
GFR SERPLBLD CREATININE-BSD FMLA CKD-EPI: 80 ML/MIN/{1.73_M2}
GLUCOSE SERPL-MCNC: 153 MG/DL (ref 70–99)
HBA1C MFR BLD: 7.6 %
HCT VFR BLD AUTO: 42.8 % (ref 40.5–52.5)
HDLC SERPL-MCNC: 34 MG/DL (ref 40–60)
HGB BLD-MCNC: 14.6 G/DL (ref 13.5–17.5)
LDLC SERPL CALC-MCNC: 85 MG/DL
LYMPHOCYTES # BLD: 3.3 K/UL (ref 1–5.1)
LYMPHOCYTES NFR BLD: 36.6 %
MCH RBC QN AUTO: 31.4 PG (ref 26–34)
MCHC RBC AUTO-ENTMCNC: 34.1 G/DL (ref 31–36)
MCV RBC AUTO: 91.9 FL (ref 80–100)
MICROALBUMIN UR DL<=1MG/L-MCNC: <1.2 MG/DL
MICROALBUMIN/CREAT UR: NORMAL MG/G (ref 0–30)
MONOCYTES # BLD: 0.6 K/UL (ref 0–1.3)
MONOCYTES NFR BLD: 6.7 %
NEUTROPHILS # BLD: 4.6 K/UL (ref 1.7–7.7)
NEUTROPHILS NFR BLD: 51.2 %
PLATELET # BLD AUTO: 223 K/UL (ref 135–450)
PMV BLD AUTO: 8.4 FL (ref 5–10.5)
POTASSIUM SERPL-SCNC: 3.9 MMOL/L (ref 3.5–5.1)
PROT SERPL-MCNC: 6.3 G/DL (ref 6.4–8.2)
PSA SERPL DL<=0.01 NG/ML-MCNC: 1.92 NG/ML (ref 0–4)
RBC # BLD AUTO: 4.65 M/UL (ref 4.2–5.9)
SODIUM SERPL-SCNC: 140 MMOL/L (ref 136–145)
TRIGL SERPL-MCNC: 138 MG/DL (ref 0–150)
VLDLC SERPL CALC-MCNC: 28 MG/DL
WBC # BLD AUTO: 9 K/UL (ref 4–11)

## 2024-10-02 ENCOUNTER — OFFICE VISIT (OUTPATIENT)
Dept: INTERNAL MEDICINE CLINIC | Age: 69
End: 2024-10-02

## 2024-10-02 VITALS
HEIGHT: 71 IN | DIASTOLIC BLOOD PRESSURE: 74 MMHG | WEIGHT: 254 LBS | BODY MASS INDEX: 35.56 KG/M2 | SYSTOLIC BLOOD PRESSURE: 138 MMHG

## 2024-10-02 DIAGNOSIS — E66.01 SEVERE OBESITY (BMI 35.0-39.9) WITH COMORBIDITY: ICD-10-CM

## 2024-10-02 DIAGNOSIS — I10 ESSENTIAL HYPERTENSION: ICD-10-CM

## 2024-10-02 DIAGNOSIS — E11.65 TYPE 2 DIABETES MELLITUS WITH HYPERGLYCEMIA, WITHOUT LONG-TERM CURRENT USE OF INSULIN (HCC): Primary | ICD-10-CM

## 2024-10-02 RX ORDER — METFORMIN HCL 500 MG
500 TABLET, EXTENDED RELEASE 24 HR ORAL
Qty: 90 TABLET | Refills: 1 | Status: SHIPPED | OUTPATIENT
Start: 2024-10-02

## 2024-10-02 RX ORDER — BLOOD-GLUCOSE METER
1 KIT MISCELLANEOUS DAILY
Qty: 1 KIT | Refills: 0 | Status: SHIPPED | OUTPATIENT
Start: 2024-10-02

## 2024-10-02 SDOH — ECONOMIC STABILITY: FOOD INSECURITY: WITHIN THE PAST 12 MONTHS, YOU WORRIED THAT YOUR FOOD WOULD RUN OUT BEFORE YOU GOT MONEY TO BUY MORE.: NEVER TRUE

## 2024-10-02 SDOH — ECONOMIC STABILITY: FOOD INSECURITY: WITHIN THE PAST 12 MONTHS, THE FOOD YOU BOUGHT JUST DIDN'T LAST AND YOU DIDN'T HAVE MONEY TO GET MORE.: NEVER TRUE

## 2024-10-02 SDOH — ECONOMIC STABILITY: INCOME INSECURITY: HOW HARD IS IT FOR YOU TO PAY FOR THE VERY BASICS LIKE FOOD, HOUSING, MEDICAL CARE, AND HEATING?: NOT HARD AT ALL

## 2024-10-02 NOTE — ASSESSMENT & PLAN NOTE
Not optimally controlled. Recently A1c 7.6%. Has had diarrhea in the past with metformin  - advised increasing metformin from every other day to everyday  - change metformin to XR

## 2024-10-02 NOTE — ASSESSMENT & PLAN NOTE
Patient would like to lose weight.   - cut back on carbohydrates  - increase exercise frequency/intensity  - referred to dietician

## 2024-10-02 NOTE — PROGRESS NOTES
status: Never Used   Substance and Sexual Activity    Alcohol use: Yes     Comment: occassionally    Drug use: No    Sexual activity: Not on file   Other Topics Concern    Not on file   Social History Narrative    Not on file     Social Determinants of Health     Financial Resource Strain: Low Risk  (10/2/2024)    Overall Financial Resource Strain (CARDIA)     Difficulty of Paying Living Expenses: Not hard at all   Food Insecurity: No Food Insecurity (10/2/2024)    Hunger Vital Sign     Worried About Running Out of Food in the Last Year: Never true     Ran Out of Food in the Last Year: Never true   Transportation Needs: Unknown (10/2/2024)    PRAPARE - Transportation     Lack of Transportation (Medical): Not on file     Lack of Transportation (Non-Medical): No   Physical Activity: Insufficiently Active (9/21/2023)    Exercise Vital Sign     Days of Exercise per Week: 2 days     Minutes of Exercise per Session: 20 min   Stress: Not on file   Social Connections: Not on file   Intimate Partner Violence: Unknown (1/20/2024)    Received from [facility]     Interpersonal Safety     Feel physically or emotionally unsafe where currently live: Not on file     Harm by anyone: Not on file     Emotionally Harmed: Not on file   Housing Stability: Unknown (10/2/2024)    Housing Stability Vital Sign     Unable to Pay for Housing in the Last Year: Not on file     Number of Times Moved in the Last Year: Not on file     Homeless in the Last Year: No        Family History   Problem Relation Age of Onset    Diabetes Mother     No Known Problems Father        Vitals:    10/02/24 0827   BP: 138/74   Site: Left Upper Arm   Weight: 115.2 kg (254 lb)   Height: 1.803 m (5' 11\")     Estimated body mass index is 35.43 kg/m² as calculated from the following:    Height as of this encounter: 1.803 m (5' 11\").    Weight as of this encounter: 115.2 kg (254 lb).    Health Maintenance Due   Topic Date Due    Diabetic foot exam  Never done

## 2024-10-02 NOTE — ASSESSMENT & PLAN NOTE
Controlled at home. Slightly elevated today.  - discussed limiting sodium and increasing exercise  - continue valsartan-HCTZ

## 2024-10-08 DIAGNOSIS — E11.65 TYPE 2 DIABETES MELLITUS WITH HYPERGLYCEMIA, WITHOUT LONG-TERM CURRENT USE OF INSULIN (HCC): Primary | ICD-10-CM

## 2024-10-08 RX ORDER — BLOOD-GLUCOSE SENSOR
EACH MISCELLANEOUS
Qty: 4 EACH | Refills: 3 | Status: SHIPPED | OUTPATIENT
Start: 2024-10-08

## 2024-10-08 NOTE — TELEPHONE ENCOUNTER
Pharm is calling req a new script be sent over with a diagnosis code for the free style kit.        Pharm- [facility] [id number] - [place], [place] - [address] - P [phone] - F [phone]  [facility] [place] [address] [place] [address]  Phone: [phone]  Fax: [phone]

## 2024-10-25 ENCOUNTER — TELEPHONE (OUTPATIENT)
Dept: INTERNAL MEDICINE CLINIC | Age: 69
End: 2024-10-25

## 2024-10-25 DIAGNOSIS — E11.9 TYPE 2 DIABETES MELLITUS WITHOUT COMPLICATION, WITHOUT LONG-TERM CURRENT USE OF INSULIN (HCC): ICD-10-CM

## 2024-10-25 DIAGNOSIS — E11.65 TYPE 2 DIABETES MELLITUS WITH HYPERGLYCEMIA, WITHOUT LONG-TERM CURRENT USE OF INSULIN (HCC): ICD-10-CM

## 2024-10-25 RX ORDER — LANCETS
1 EACH MISCELLANEOUS DAILY
Qty: 100 EACH | Refills: 3 | Status: SHIPPED | OUTPATIENT
Start: 2024-10-25

## 2024-10-25 RX ORDER — BLOOD SUGAR DIAGNOSTIC
STRIP MISCELLANEOUS
Qty: 50 STRIP | Refills: 3 | Status: SHIPPED | OUTPATIENT
Start: 2024-10-25

## 2024-10-25 RX ORDER — BLOOD-GLUCOSE METER
1 KIT MISCELLANEOUS DAILY
Qty: 1 KIT | Refills: 0 | Status: SHIPPED | OUTPATIENT
Start: 2024-10-25

## 2024-10-25 NOTE — TELEPHONE ENCOUNTER
Pt states he needs Diabetes new outfit supplies and is completely out needs ASAP. Pls send to pharmacy below    [phone]  Pls call and advise        [facility] [id number] - [place], [place] - [facility] [address] - P [phone] - F [phone]

## 2024-10-25 NOTE — TELEPHONE ENCOUNTER
[facility] called regarding the pt's glucose monitoring kit.  They need a new script sent with a diagnoses code.  They also need a separate script for Lancets and test strips.   [facility] [id number] - [place], [place] - [facility] - P [phone] - F [phone]

## 2024-12-24 DIAGNOSIS — I10 ESSENTIAL HYPERTENSION: Primary | ICD-10-CM

## 2024-12-24 RX ORDER — VALSARTAN AND HYDROCHLOROTHIAZIDE 320; 25 MG/1; MG/1
1 TABLET, FILM COATED ORAL DAILY
Qty: 90 TABLET | Refills: 1 | Status: SHIPPED | OUTPATIENT
Start: 2024-12-24

## 2024-12-24 NOTE — TELEPHONE ENCOUNTER
Refill req from Pharm    valsartan-hydroCHLOROthiazide (DIOVAN-HCT) 320-25 MG per tablet [[id number]]    Pharm- [facility] [id number] - [place], [place] - [address] - P [phone] - F [phone]  [facility] [place] [address] [place] [address]  Phone: [phone]  Fax: [phone]

## 2024-12-24 NOTE — TELEPHONE ENCOUNTER
Last appointment: 10/2/2024  Next appointment: 1/27/2025  Last refill:   Last ordered: 7 months ago (5/20/2024) by Domitila Leavitt MD           Requested Prescriptions     Pending Prescriptions Disp Refills    valsartan-hydroCHLOROthiazide (DIOVAN-HCT) 320-25 MG per tablet 90 tablet 1     Sig: Take 1 tablet by mouth daily

## 2024-12-27 ENCOUNTER — OFFICE VISIT (OUTPATIENT)
Dept: FAMILY MEDICINE CLINIC | Age: 69
End: 2024-12-27

## 2024-12-27 VITALS
TEMPERATURE: 97.3 F | HEART RATE: 74 BPM | DIASTOLIC BLOOD PRESSURE: 78 MMHG | SYSTOLIC BLOOD PRESSURE: 138 MMHG | BODY MASS INDEX: 35.36 KG/M2 | OXYGEN SATURATION: 98 % | HEIGHT: 71 IN | WEIGHT: 252.6 LBS

## 2024-12-27 DIAGNOSIS — B96.89 ACUTE BACTERIAL SINUSITIS: Primary | ICD-10-CM

## 2024-12-27 DIAGNOSIS — J01.90 ACUTE BACTERIAL SINUSITIS: Primary | ICD-10-CM

## 2024-12-27 NOTE — PROGRESS NOTES
Jan Devine (:  1955) is a 69 y.o. male,Established patient, here for evaluation of the following chief complaint(s):  Congestion (Head congestion x3 weeks - green mucous when blows nose/No cough, no fevers )         Assessment & Plan  Acute bacterial sinusitis  Acute condition  Start Augmentin as directed  Encouraged sinus function  Follow-up as needed or in 1 month for annual wellness visit    Orders:    amoxicillin-clavulanate (AUGMENTIN) 875-125 MG per tablet; Take 1 tablet by mouth 2 times daily for 7 days      No follow-ups on file.       Subjective   Patient complains of 3 weeks head and sinus congestion.  Lots of green drainage.  Has tried OTC with no relief.  No fever, slight cough, no shortness of breath      No Known Allergies    Current Outpatient Medications   Medication Sig Dispense Refill    amoxicillin-clavulanate (AUGMENTIN) 875-125 MG per tablet Take 1 tablet by mouth 2 times daily for 7 days 14 tablet 0    valsartan-hydroCHLOROthiazide (DIOVAN-HCT) 320-25 MG per tablet Take 1 tablet by mouth daily 90 tablet 1    blood glucose test strips (ONETOUCH VERIO) strip TEST DAILY AS NEEDED 50 strip 3    glucose monitoring kit 1 kit by Does not apply route daily 1 kit 0    [employer] Lancets MISC 1 each by Does not apply route daily 100 each 3    Continuous Glucose Sensor (FREESTYLE SHAISTA 3 SENSOR) MISC Use daily as needed. 4 each 3    metFORMIN (GLUCOPHAGE-XR) 500 MG extended release tablet Take 1 tablet by mouth daily (with breakfast) 90 tablet 1    pravastatin (PRAVACHOL) 40 MG tablet TAKE 1 TABLET BY MOUTH DAILY 90 tablet 3    famotidine (PEPCID) 20 MG tablet Take 1 tablet by mouth daily       No current facility-administered medications for this visit.       Review of Systems   All other systems reviewed and are negative.         Objective   Vitals:    24 0812   BP: 138/78   Site: Left Upper Arm   Position: Sitting   Cuff Size: Large Adult   Pulse: 74   Temp: 97.3 °F (36.3 °C)

## 2025-01-12 ENCOUNTER — APPOINTMENT (OUTPATIENT)
Dept: GENERAL RADIOLOGY | Age: 70
DRG: 321 | End: 2025-01-12
Payer: MEDICARE

## 2025-01-12 ENCOUNTER — HOSPITAL ENCOUNTER (INPATIENT)
Age: 70
LOS: 9 days | Discharge: HOME | DRG: 321 | End: 2025-01-21
Attending: EMERGENCY MEDICINE | Admitting: INTERNAL MEDICINE
Payer: MEDICARE

## 2025-01-12 DIAGNOSIS — I24.9 ACUTE CORONARY SYNDROME (HCC): ICD-10-CM

## 2025-01-12 DIAGNOSIS — I21.3 STEMI (ST ELEVATION MYOCARDIAL INFARCTION) (HCC): ICD-10-CM

## 2025-01-12 DIAGNOSIS — I21.3 ST ELEVATION MYOCARDIAL INFARCTION (STEMI), UNSPECIFIED ARTERY (HCC): Primary | ICD-10-CM

## 2025-01-12 PROBLEM — I21.02 STEMI INVOLVING LEFT ANTERIOR DESCENDING CORONARY ARTERY (HCC): Status: ACTIVE | Noted: 2025-01-12

## 2025-01-12 LAB
ANION GAP SERPL CALCULATED.3IONS-SCNC: 16 MMOL/L (ref 3–16)
APTT BLD: 26.1 SEC (ref 22.1–36.4)
BASOPHILS # BLD: 0.2 K/UL (ref 0–0.2)
BASOPHILS NFR BLD: 0.9 %
BUN SERPL-MCNC: 20 MG/DL (ref 7–20)
CALCIUM SERPL-MCNC: 9.4 MG/DL (ref 8.3–10.6)
CHLORIDE SERPL-SCNC: 97 MMOL/L (ref 99–110)
CO2 SERPL-SCNC: 23 MMOL/L (ref 21–32)
CREAT SERPL-MCNC: 1 MG/DL (ref 0.8–1.3)
DEPRECATED RDW RBC AUTO: 13.9 % (ref 12.4–15.4)
EOSINOPHIL # BLD: 0.2 K/UL (ref 0–0.6)
EOSINOPHIL NFR BLD: 0.9 %
GFR SERPLBLD CREATININE-BSD FMLA CKD-EPI: 80 ML/MIN/{1.73_M2}
GLUCOSE SERPL-MCNC: 209 MG/DL (ref 70–99)
HCT VFR BLD AUTO: 47.3 % (ref 40.5–52.5)
HGB BLD-MCNC: 16 G/DL (ref 13.5–17.5)
INR PPP: 0.99 (ref 0.85–1.15)
LYMPHOCYTES # BLD: 5.1 K/UL (ref 1–5.1)
LYMPHOCYTES NFR BLD: 24.7 %
MCH RBC QN AUTO: 30.3 PG (ref 26–34)
MCHC RBC AUTO-ENTMCNC: 33.8 G/DL (ref 31–36)
MCV RBC AUTO: 89.7 FL (ref 80–100)
MONOCYTES # BLD: 1.1 K/UL (ref 0–1.3)
MONOCYTES NFR BLD: 5.4 %
NEUTROPHILS # BLD: 14 K/UL (ref 1.7–7.7)
NEUTROPHILS NFR BLD: 68.1 %
NT-PROBNP SERPL-MCNC: 160 PG/ML (ref 0–124)
NT-PROBNP SERPL-MCNC: 351 PG/ML (ref 0–124)
PLATELET # BLD AUTO: 343 K/UL (ref 135–450)
PMV BLD AUTO: 8.2 FL (ref 5–10.5)
POC ACT LR: 250 SEC
POC ACT LR: 265 SEC
POTASSIUM SERPL-SCNC: 3.6 MMOL/L (ref 3.5–5.1)
PROCALCITONIN SERPL IA-MCNC: 0.05 NG/ML (ref 0–0.15)
PROTHROMBIN TIME: 13.3 SEC (ref 11.9–14.9)
RBC # BLD AUTO: 5.27 M/UL (ref 4.2–5.9)
SODIUM SERPL-SCNC: 136 MMOL/L (ref 136–145)
TROPONIN, HIGH SENSITIVITY: 1201 NG/L (ref 0–22)
TROPONIN, HIGH SENSITIVITY: 42 NG/L (ref 0–22)
WBC # BLD AUTO: 20.6 K/UL (ref 4–11)

## 2025-01-12 PROCEDURE — 94660 CPAP INITIATION&MGMT: CPT

## 2025-01-12 PROCEDURE — 36415 COLL VENOUS BLD VENIPUNCTURE: CPT

## 2025-01-12 PROCEDURE — 6360000002 HC RX W HCPCS: Performed by: INTERNAL MEDICINE

## 2025-01-12 PROCEDURE — 85730 THROMBOPLASTIN TIME PARTIAL: CPT

## 2025-01-12 PROCEDURE — 6360000002 HC RX W HCPCS

## 2025-01-12 PROCEDURE — B2111ZZ FLUOROSCOPY OF MULTIPLE CORONARY ARTERIES USING LOW OSMOLAR CONTRAST: ICD-10-PCS | Performed by: INTERNAL MEDICINE

## 2025-01-12 PROCEDURE — 6360000002 HC RX W HCPCS: Performed by: EMERGENCY MEDICINE

## 2025-01-12 PROCEDURE — 2000000000 HC ICU R&B

## 2025-01-12 PROCEDURE — C1894 INTRO/SHEATH, NON-LASER: HCPCS | Performed by: INTERNAL MEDICINE

## 2025-01-12 PROCEDURE — 2580000003 HC RX 258

## 2025-01-12 PROCEDURE — 93458 L HRT ARTERY/VENTRICLE ANGIO: CPT | Performed by: INTERNAL MEDICINE

## 2025-01-12 PROCEDURE — 94761 N-INVAS EAR/PLS OXIMETRY MLT: CPT

## 2025-01-12 PROCEDURE — 99285 EMERGENCY DEPT VISIT HI MDM: CPT

## 2025-01-12 PROCEDURE — C1769 GUIDE WIRE: HCPCS | Performed by: INTERNAL MEDICINE

## 2025-01-12 PROCEDURE — C9606 PERC D-E COR REVASC W AMI S: HCPCS | Performed by: INTERNAL MEDICINE

## 2025-01-12 PROCEDURE — 81001 URINALYSIS AUTO W/SCOPE: CPT

## 2025-01-12 PROCEDURE — 5A09557 ASSISTANCE WITH RESPIRATORY VENTILATION, GREATER THAN 96 CONSECUTIVE HOURS, CONTINUOUS POSITIVE AIRWAY PRESSURE: ICD-10-PCS | Performed by: INTERNAL MEDICINE

## 2025-01-12 PROCEDURE — 92978 ENDOLUMINL IVUS OCT C 1ST: CPT | Performed by: INTERNAL MEDICINE

## 2025-01-12 PROCEDURE — 84145 PROCALCITONIN (PCT): CPT

## 2025-01-12 PROCEDURE — 85025 COMPLETE CBC W/AUTO DIFF WBC: CPT

## 2025-01-12 PROCEDURE — 80048 BASIC METABOLIC PNL TOTAL CA: CPT

## 2025-01-12 PROCEDURE — 6370000000 HC RX 637 (ALT 250 FOR IP): Performed by: INTERNAL MEDICINE

## 2025-01-12 PROCEDURE — 027034Z DILATION OF CORONARY ARTERY, ONE ARTERY WITH DRUG-ELUTING INTRALUMINAL DEVICE, PERCUTANEOUS APPROACH: ICD-10-PCS | Performed by: INTERNAL MEDICINE

## 2025-01-12 PROCEDURE — 2500000003 HC RX 250 WO HCPCS: Performed by: INTERNAL MEDICINE

## 2025-01-12 PROCEDURE — C1725 CATH, TRANSLUMIN NON-LASER: HCPCS | Performed by: INTERNAL MEDICINE

## 2025-01-12 PROCEDURE — 99153 MOD SED SAME PHYS/QHP EA: CPT | Performed by: INTERNAL MEDICINE

## 2025-01-12 PROCEDURE — 99152 MOD SED SAME PHYS/QHP 5/>YRS: CPT | Performed by: INTERNAL MEDICINE

## 2025-01-12 PROCEDURE — 85610 PROTHROMBIN TIME: CPT

## 2025-01-12 PROCEDURE — 2700000000 HC OXYGEN THERAPY PER DAY

## 2025-01-12 PROCEDURE — 51702 INSERT TEMP BLADDER CATH: CPT

## 2025-01-12 PROCEDURE — 4A023N7 MEASUREMENT OF CARDIAC SAMPLING AND PRESSURE, LEFT HEART, PERCUTANEOUS APPROACH: ICD-10-PCS | Performed by: INTERNAL MEDICINE

## 2025-01-12 PROCEDURE — 93005 ELECTROCARDIOGRAM TRACING: CPT | Performed by: EMERGENCY MEDICINE

## 2025-01-12 PROCEDURE — 6360000004 HC RX CONTRAST MEDICATION: Performed by: INTERNAL MEDICINE

## 2025-01-12 PROCEDURE — C1753 CATH, INTRAVAS ULTRASOUND: HCPCS | Performed by: INTERNAL MEDICINE

## 2025-01-12 PROCEDURE — 99291 CRITICAL CARE FIRST HOUR: CPT | Performed by: INTERNAL MEDICINE

## 2025-01-12 PROCEDURE — 85347 COAGULATION TIME ACTIVATED: CPT

## 2025-01-12 PROCEDURE — C1887 CATHETER, GUIDING: HCPCS | Performed by: INTERNAL MEDICINE

## 2025-01-12 PROCEDURE — 83880 ASSAY OF NATRIURETIC PEPTIDE: CPT

## 2025-01-12 PROCEDURE — 71045 X-RAY EXAM CHEST 1 VIEW: CPT

## 2025-01-12 PROCEDURE — 2500000003 HC RX 250 WO HCPCS

## 2025-01-12 PROCEDURE — C1874 STENT, COATED/COV W/DEL SYS: HCPCS | Performed by: INTERNAL MEDICINE

## 2025-01-12 PROCEDURE — 2709999900 HC NON-CHARGEABLE SUPPLY: Performed by: INTERNAL MEDICINE

## 2025-01-12 PROCEDURE — 84484 ASSAY OF TROPONIN QUANT: CPT

## 2025-01-12 PROCEDURE — 92941 PRQ TRLML REVSC TOT OCCL AMI: CPT | Performed by: INTERNAL MEDICINE

## 2025-01-12 DEVICE — STENT CORONARY ONYX FRONTIER RX 4X38 MM ZOTAROLIMUS ELUTE: Type: IMPLANTABLE DEVICE | Status: FUNCTIONAL

## 2025-01-12 RX ORDER — PROCHLORPERAZINE EDISYLATE 5 MG/ML
10 INJECTION INTRAMUSCULAR; INTRAVENOUS EVERY 6 HOURS PRN
Status: DISCONTINUED | OUTPATIENT
Start: 2025-01-12 | End: 2025-01-21 | Stop reason: HOSPADM

## 2025-01-12 RX ORDER — INSULIN LISPRO 100 [IU]/ML
0-8 INJECTION, SOLUTION INTRAVENOUS; SUBCUTANEOUS
Status: DISCONTINUED | OUTPATIENT
Start: 2025-01-12 | End: 2025-01-13

## 2025-01-12 RX ORDER — HEPARIN SODIUM 1000 [USP'U]/ML
INJECTION, SOLUTION INTRAVENOUS; SUBCUTANEOUS PRN
Status: DISCONTINUED | OUTPATIENT
Start: 2025-01-12 | End: 2025-01-12 | Stop reason: HOSPADM

## 2025-01-12 RX ORDER — ADENOSINE 3 MG/ML
INJECTION, SOLUTION INTRAVENOUS PRN
Status: DISCONTINUED | OUTPATIENT
Start: 2025-01-12 | End: 2025-01-12 | Stop reason: HOSPADM

## 2025-01-12 RX ORDER — METOPROLOL TARTRATE 1 MG/ML
5 INJECTION, SOLUTION INTRAVENOUS ONCE
Status: COMPLETED | OUTPATIENT
Start: 2025-01-12 | End: 2025-01-12

## 2025-01-12 RX ORDER — METOPROLOL TARTRATE 25 MG/1
25 TABLET, FILM COATED ORAL 2 TIMES DAILY
Status: DISCONTINUED | OUTPATIENT
Start: 2025-01-12 | End: 2025-01-13

## 2025-01-12 RX ORDER — ASPIRIN 81 MG/1
324 TABLET, CHEWABLE ORAL ONCE
Status: COMPLETED | OUTPATIENT
Start: 2025-01-12 | End: 2025-01-13

## 2025-01-12 RX ORDER — FUROSEMIDE 10 MG/ML
40 INJECTION INTRAMUSCULAR; INTRAVENOUS ONCE
Status: COMPLETED | OUTPATIENT
Start: 2025-01-12 | End: 2025-01-12

## 2025-01-12 RX ORDER — FUROSEMIDE 10 MG/ML
INJECTION INTRAMUSCULAR; INTRAVENOUS
Status: COMPLETED
Start: 2025-01-12 | End: 2025-01-12

## 2025-01-12 RX ORDER — EPTIFIBATIDE 2 MG/ML
INJECTION, SOLUTION INTRAVENOUS PRN
Status: DISCONTINUED | OUTPATIENT
Start: 2025-01-12 | End: 2025-01-12 | Stop reason: HOSPADM

## 2025-01-12 RX ORDER — NITROGLYCERIN 0.4 MG/1
0.4 TABLET SUBLINGUAL EVERY 5 MIN PRN
Status: DISCONTINUED | OUTPATIENT
Start: 2025-01-12 | End: 2025-01-21 | Stop reason: HOSPADM

## 2025-01-12 RX ORDER — ONDANSETRON 2 MG/ML
4 INJECTION INTRAMUSCULAR; INTRAVENOUS EVERY 6 HOURS PRN
Status: DISCONTINUED | OUTPATIENT
Start: 2025-01-12 | End: 2025-01-21 | Stop reason: HOSPADM

## 2025-01-12 RX ORDER — EPTIFIBATIDE 0.75 MG/ML
INJECTION, SOLUTION INTRAVENOUS CONTINUOUS PRN
Status: COMPLETED | OUTPATIENT
Start: 2025-01-12 | End: 2025-01-12

## 2025-01-12 RX ORDER — ASPIRIN 81 MG/1
81 TABLET, CHEWABLE ORAL DAILY
Status: DISCONTINUED | OUTPATIENT
Start: 2025-01-13 | End: 2025-01-17

## 2025-01-12 RX ORDER — FUROSEMIDE 10 MG/ML
INJECTION INTRAMUSCULAR; INTRAVENOUS PRN
Status: DISCONTINUED | OUTPATIENT
Start: 2025-01-12 | End: 2025-01-12 | Stop reason: HOSPADM

## 2025-01-12 RX ORDER — MORPHINE SULFATE 4 MG/ML
4 INJECTION INTRAVENOUS
Status: COMPLETED | OUTPATIENT
Start: 2025-01-12 | End: 2025-01-12

## 2025-01-12 RX ORDER — SODIUM CHLORIDE 9 MG/ML
INJECTION, SOLUTION INTRAVENOUS PRN
Status: DISCONTINUED | OUTPATIENT
Start: 2025-01-12 | End: 2025-01-21 | Stop reason: HOSPADM

## 2025-01-12 RX ORDER — ACETAMINOPHEN 325 MG/1
650 TABLET ORAL EVERY 4 HOURS PRN
Status: DISCONTINUED | OUTPATIENT
Start: 2025-01-12 | End: 2025-01-21 | Stop reason: HOSPADM

## 2025-01-12 RX ORDER — ROSUVASTATIN CALCIUM 20 MG/1
40 TABLET, COATED ORAL NIGHTLY
Status: DISCONTINUED | OUTPATIENT
Start: 2025-01-12 | End: 2025-01-21 | Stop reason: HOSPADM

## 2025-01-12 RX ORDER — ONDANSETRON 2 MG/ML
4 INJECTION INTRAMUSCULAR; INTRAVENOUS ONCE
Status: COMPLETED | OUTPATIENT
Start: 2025-01-12 | End: 2025-01-12

## 2025-01-12 RX ORDER — FENTANYL CITRATE 50 UG/ML
INJECTION, SOLUTION INTRAMUSCULAR; INTRAVENOUS PRN
Status: DISCONTINUED | OUTPATIENT
Start: 2025-01-12 | End: 2025-01-12 | Stop reason: HOSPADM

## 2025-01-12 RX ORDER — LIDOCAINE HYDROCHLORIDE 10 MG/ML
INJECTION, SOLUTION EPIDURAL; INFILTRATION; INTRACAUDAL; PERINEURAL PRN
Status: DISCONTINUED | OUTPATIENT
Start: 2025-01-12 | End: 2025-01-12 | Stop reason: HOSPADM

## 2025-01-12 RX ORDER — HEPARIN SODIUM 1000 [USP'U]/ML
5000 INJECTION, SOLUTION INTRAVENOUS; SUBCUTANEOUS ONCE
Status: DISCONTINUED | OUTPATIENT
Start: 2025-01-12 | End: 2025-01-13 | Stop reason: ALTCHOICE

## 2025-01-12 RX ORDER — METOPROLOL TARTRATE 1 MG/ML
INJECTION, SOLUTION INTRAVENOUS PRN
Status: DISCONTINUED | OUTPATIENT
Start: 2025-01-12 | End: 2025-01-12 | Stop reason: HOSPADM

## 2025-01-12 RX ORDER — MIDAZOLAM HYDROCHLORIDE 1 MG/ML
INJECTION, SOLUTION INTRAMUSCULAR; INTRAVENOUS PRN
Status: DISCONTINUED | OUTPATIENT
Start: 2025-01-12 | End: 2025-01-12 | Stop reason: HOSPADM

## 2025-01-12 RX ORDER — DEXTROSE MONOHYDRATE 100 MG/ML
INJECTION, SOLUTION INTRAVENOUS CONTINUOUS PRN
Status: DISCONTINUED | OUTPATIENT
Start: 2025-01-12 | End: 2025-01-21 | Stop reason: HOSPADM

## 2025-01-12 RX ORDER — GLUCAGON 1 MG/ML
1 KIT INJECTION PRN
Status: DISCONTINUED | OUTPATIENT
Start: 2025-01-12 | End: 2025-01-21 | Stop reason: HOSPADM

## 2025-01-12 RX ORDER — IOPAMIDOL 755 MG/ML
INJECTION, SOLUTION INTRAVASCULAR PRN
Status: DISCONTINUED | OUTPATIENT
Start: 2025-01-12 | End: 2025-01-12 | Stop reason: HOSPADM

## 2025-01-12 RX ORDER — SODIUM CHLORIDE 0.9 % (FLUSH) 0.9 %
5-40 SYRINGE (ML) INJECTION PRN
Status: DISCONTINUED | OUTPATIENT
Start: 2025-01-12 | End: 2025-01-21 | Stop reason: HOSPADM

## 2025-01-12 RX ORDER — POTASSIUM CHLORIDE 7.45 MG/ML
10 INJECTION INTRAVENOUS
Status: COMPLETED | OUTPATIENT
Start: 2025-01-12 | End: 2025-01-13

## 2025-01-12 RX ORDER — NICARDIPINE HYDROCHLORIDE 2.5 MG/ML
INJECTION INTRAVENOUS PRN
Status: DISCONTINUED | OUTPATIENT
Start: 2025-01-12 | End: 2025-01-12 | Stop reason: HOSPADM

## 2025-01-12 RX ORDER — SODIUM CHLORIDE 0.9 % (FLUSH) 0.9 %
5-40 SYRINGE (ML) INJECTION EVERY 12 HOURS SCHEDULED
Status: DISCONTINUED | OUTPATIENT
Start: 2025-01-12 | End: 2025-01-21 | Stop reason: HOSPADM

## 2025-01-12 RX ADMIN — POTASSIUM CHLORIDE 10 MEQ: 10 INJECTION, SOLUTION INTRAVENOUS at 23:26

## 2025-01-12 RX ADMIN — ONDANSETRON 4 MG: 2 INJECTION, SOLUTION INTRAMUSCULAR; INTRAVENOUS at 22:25

## 2025-01-12 RX ADMIN — FUROSEMIDE 5 MG/HR: 10 INJECTION, SOLUTION INTRAVENOUS at 21:45

## 2025-01-12 RX ADMIN — WATER 2000 MG: 1 INJECTION INTRAMUSCULAR; INTRAVENOUS; SUBCUTANEOUS at 23:27

## 2025-01-12 RX ADMIN — ONDANSETRON 4 MG: 2 INJECTION INTRAMUSCULAR; INTRAVENOUS at 18:34

## 2025-01-12 RX ADMIN — MORPHINE SULFATE 4 MG: 4 INJECTION INTRAVENOUS at 18:34

## 2025-01-12 RX ADMIN — FUROSEMIDE 40 MG: 10 INJECTION, SOLUTION INTRAMUSCULAR; INTRAVENOUS at 21:09

## 2025-01-12 RX ADMIN — METOPROLOL TARTRATE 5 MG: 5 INJECTION INTRAVENOUS at 21:18

## 2025-01-12 RX ADMIN — FUROSEMIDE 40 MG: 10 INJECTION INTRAMUSCULAR; INTRAVENOUS at 21:09

## 2025-01-12 ASSESSMENT — PAIN SCALES - GENERAL: PAINLEVEL_OUTOF10: 10

## 2025-01-12 ASSESSMENT — PAIN DESCRIPTION - LOCATION: LOCATION: CHEST

## 2025-01-12 ASSESSMENT — PAIN DESCRIPTION - ORIENTATION: ORIENTATION: LEFT

## 2025-01-12 NOTE — ED PROVIDER NOTES
THE [facility]  EMERGENCY DEPARTMENT ENCOUNTER          ATTENDING PHYSICIAN NOTE       Date of evaluation: 1/12/2025    Chief Complaint     Chest Pain      History of Present Illness     Jan Devine is a 69 y.o. male with a history of hypertension and type 2 diabetes who presents with complaints of chest pain that began around 3:00 this afternoon, approximately 3 and half hours prior to arrival.  It has been off and on during that time and he called 911 because it persisted.  He has some associated nausea diaphoresis and shortness of breath.  No prior similar symptoms.    ASSESSMENT / PLAN  (MEDICAL DECISION MAKING)     INITIAL VITALS: BP: (!) 155/115, Temp: 97.6 °F (36.4 °C), Pulse: 70, Respirations: 17, SpO2: 96 %      Jan Devine is a 69 y.o. male with a history of hypertension and type 2 diabetes presenting with chest pain off and on for the last 3 hours and prehospital EKG showing ST elevation in the anterolateral leads with reciprocal changes in the inferior leads.  Cath Lab was activated based on prehospital EKG and cardiologist will be taking the patient for angiogram.  Prehospital meds included nitroglycerin and aspirin.  Labs and xray are pending at this time but patient will be going to the Cath Lab.  Brilinta, and heparin ordered in the ED.    Medical Decision Making  Amount and/or Complexity of Data Reviewed  Labs: ordered.  Radiology: ordered.  ECG/medicine tests: ordered.    Risk  Prescription drug management.  Decision regarding hospitalization.        Critical Care:  Due to the immediate potential for life-threatening deterioration due to STEMI requiring Cath Lab activation, I spent 20 minutes providing critical care.  This time excludes time spent performing procedures but includes time spent on direct patient care, history retrieval, review of the chart, and discussions with patient, family, and consultant(s).    Clinical Impression     1. ST elevation myocardial infarction (STEMI),

## 2025-01-13 ENCOUNTER — APPOINTMENT (OUTPATIENT)
Age: 70
DRG: 321 | End: 2025-01-13
Attending: INTERNAL MEDICINE
Payer: MEDICARE

## 2025-01-13 ENCOUNTER — APPOINTMENT (OUTPATIENT)
Dept: GENERAL RADIOLOGY | Age: 70
DRG: 321 | End: 2025-01-13
Payer: MEDICARE

## 2025-01-13 PROBLEM — J96.01 ACUTE HYPOXIC RESPIRATORY FAILURE: Status: ACTIVE | Noted: 2025-01-13

## 2025-01-13 PROBLEM — J81.0 ACUTE PULMONARY EDEMA: Status: ACTIVE | Noted: 2025-01-13

## 2025-01-13 PROBLEM — E11.9 CONTROLLED TYPE 2 DIABETES MELLITUS WITHOUT COMPLICATION, WITHOUT LONG-TERM CURRENT USE OF INSULIN (HCC): Status: ACTIVE | Noted: 2025-01-13

## 2025-01-13 PROBLEM — I48.91 NEW ONSET ATRIAL FIBRILLATION (HCC): Status: ACTIVE | Noted: 2025-01-13

## 2025-01-13 LAB
ALBUMIN SERPL-MCNC: 3.8 G/DL (ref 3.4–5)
ALBUMIN SERPL-MCNC: 3.9 G/DL (ref 3.4–5)
ALBUMIN SERPL-MCNC: 4 G/DL (ref 3.4–5)
ALP SERPL-CCNC: 78 U/L (ref 40–129)
ALT SERPL-CCNC: 86 U/L (ref 10–40)
ANION GAP SERPL CALCULATED.3IONS-SCNC: 10 MMOL/L (ref 3–16)
ANION GAP SERPL CALCULATED.3IONS-SCNC: 14 MMOL/L (ref 3–16)
ANION GAP SERPL CALCULATED.3IONS-SCNC: 21 MMOL/L (ref 3–16)
ANTI-XA UNFRAC HEPARIN: 0.11 IU/ML (ref 0.3–0.7)
ANTI-XA UNFRAC HEPARIN: <0.1 IU/ML (ref 0.3–0.7)
APTT BLD: 27.3 SEC (ref 22.1–36.4)
AST SERPL-CCNC: 476 U/L (ref 15–37)
BASE EXCESS BLDA CALC-SCNC: 2.4 MMOL/L (ref -3–3)
BASE EXCESS BLDV CALC-SCNC: 3.6 MMOL/L (ref -2–3)
BASOPHILS # BLD: 0.1 K/UL (ref 0–0.2)
BASOPHILS NFR BLD: 0.2 %
BILIRUB DIRECT SERPL-MCNC: 0.3 MG/DL (ref 0–0.3)
BILIRUB INDIRECT SERPL-MCNC: 0.4 MG/DL (ref 0–1)
BILIRUB SERPL-MCNC: 0.7 MG/DL (ref 0–1)
BILIRUB UR QL STRIP.AUTO: NEGATIVE
BUN SERPL-MCNC: 22 MG/DL (ref 7–20)
BUN SERPL-MCNC: 23 MG/DL (ref 7–20)
BUN SERPL-MCNC: 25 MG/DL (ref 7–20)
CALCIUM SERPL-MCNC: 9 MG/DL (ref 8.3–10.6)
CALCIUM SERPL-MCNC: 9 MG/DL (ref 8.3–10.6)
CALCIUM SERPL-MCNC: 9.2 MG/DL (ref 8.3–10.6)
CHLORIDE SERPL-SCNC: 94 MMOL/L (ref 99–110)
CHLORIDE SERPL-SCNC: 95 MMOL/L (ref 99–110)
CHLORIDE SERPL-SCNC: 96 MMOL/L (ref 99–110)
CLARITY UR: ABNORMAL
CO2 BLDA-SCNC: 30 MMOL/L
CO2 BLDV-SCNC: 31 MMOL/L
CO2 SERPL-SCNC: 23 MMOL/L (ref 21–32)
CO2 SERPL-SCNC: 27 MMOL/L (ref 21–32)
CO2 SERPL-SCNC: 34 MMOL/L (ref 21–32)
COHGB MFR BLDA: 1.1 % (ref 0–1.5)
COHGB MFR BLDV: 0.8 % (ref 0–1.5)
COLOR UR: ABNORMAL
CREAT SERPL-MCNC: 1.2 MG/DL (ref 0.8–1.3)
CREAT SERPL-MCNC: 1.2 MG/DL (ref 0.8–1.3)
CREAT SERPL-MCNC: 1.4 MG/DL (ref 0.8–1.3)
DEPRECATED RDW RBC AUTO: 13.9 % (ref 12.4–15.4)
DEPRECATED RDW RBC AUTO: 14.1 % (ref 12.4–15.4)
EKG ATRIAL RATE: 73 BPM
EKG DIAGNOSIS: NORMAL
EKG P AXIS: 57 DEGREES
EKG P-R INTERVAL: 190 MS
EKG Q-T INTERVAL: 408 MS
EKG QRS DURATION: 82 MS
EKG QTC CALCULATION (BAZETT): 449 MS
EKG R AXIS: 6 DEGREES
EKG T AXIS: -23 DEGREES
EKG VENTRICULAR RATE: 73 BPM
EOSINOPHIL # BLD: 0 K/UL (ref 0–0.6)
EOSINOPHIL NFR BLD: 0 %
GFR SERPLBLD CREATININE-BSD FMLA CKD-EPI: 53 ML/MIN/{1.73_M2}
GFR SERPLBLD CREATININE-BSD FMLA CKD-EPI: 64 ML/MIN/{1.73_M2}
GFR SERPLBLD CREATININE-BSD FMLA CKD-EPI: 64 ML/MIN/{1.73_M2}
GLUCOSE BLD-MCNC: 192 MG/DL (ref 70–99)
GLUCOSE BLD-MCNC: 209 MG/DL (ref 70–99)
GLUCOSE BLD-MCNC: 247 MG/DL (ref 70–99)
GLUCOSE BLD-MCNC: 293 MG/DL (ref 70–99)
GLUCOSE BLD-MCNC: 333 MG/DL (ref 70–99)
GLUCOSE SERPL-MCNC: 147 MG/DL (ref 70–99)
GLUCOSE SERPL-MCNC: 297 MG/DL (ref 70–99)
GLUCOSE SERPL-MCNC: 310 MG/DL (ref 70–99)
GLUCOSE UR STRIP.AUTO-MCNC: 100 MG/DL
HCO3 BLDA-SCNC: 28 MMOL/L (ref 21–29)
HCO3 BLDV-SCNC: 29.4 MMOL/L (ref 24–28)
HCT VFR BLD AUTO: 49.6 % (ref 40.5–52.5)
HCT VFR BLD AUTO: 51.3 % (ref 40.5–52.5)
HGB BLD-MCNC: 16.8 G/DL (ref 13.5–17.5)
HGB BLD-MCNC: 17.1 G/DL (ref 13.5–17.5)
HGB BLDA-MCNC: 18.1 G/DL
HGB UR QL STRIP.AUTO: ABNORMAL
KETONES UR STRIP.AUTO-MCNC: NEGATIVE MG/DL
LEUKOCYTE ESTERASE UR QL STRIP.AUTO: NEGATIVE
LYMPHOCYTES # BLD: 1.3 K/UL (ref 1–5.1)
LYMPHOCYTES NFR BLD: 4.9 %
MAGNESIUM SERPL-MCNC: 1.65 MG/DL (ref 1.8–2.4)
MAGNESIUM SERPL-MCNC: 1.7 MG/DL (ref 1.8–2.4)
MAGNESIUM SERPL-MCNC: 2.03 MG/DL (ref 1.8–2.4)
MCH RBC QN AUTO: 30 PG (ref 26–34)
MCH RBC QN AUTO: 30.3 PG (ref 26–34)
MCHC RBC AUTO-ENTMCNC: 33.3 G/DL (ref 31–36)
MCHC RBC AUTO-ENTMCNC: 33.8 G/DL (ref 31–36)
MCV RBC AUTO: 89.6 FL (ref 80–100)
MCV RBC AUTO: 90.2 FL (ref 80–100)
METHGB MFR BLDA: 0 % (ref 0–1.4)
METHGB MFR BLDV: 0 % (ref 0–1.5)
MONOCYTES # BLD: 1.3 K/UL (ref 0–1.3)
MONOCYTES NFR BLD: 4.6 %
NEUTROPHILS # BLD: 24.4 K/UL (ref 1.7–7.7)
NEUTROPHILS NFR BLD: 90.3 %
NITRITE UR QL STRIP.AUTO: NEGATIVE
PCO2 BLDA: 46.8 MMHG (ref 35–45)
PCO2 BLDV: 47.2 MMHG (ref 41–51)
PERFORMED ON: ABNORMAL
PH BLDA: 7.39 [PH] (ref 7.35–7.45)
PH BLDV: 7.4 [PH] (ref 7.35–7.45)
PH UR STRIP.AUTO: 6 [PH] (ref 5–8)
PHOSPHATE SERPL-MCNC: 2.7 MG/DL (ref 2.5–4.9)
PHOSPHATE SERPL-MCNC: 3.3 MG/DL (ref 2.5–4.9)
PHOSPHATE SERPL-MCNC: 3.8 MG/DL (ref 2.5–4.9)
PLATELET # BLD AUTO: 310 K/UL (ref 135–450)
PLATELET # BLD AUTO: 323 K/UL (ref 135–450)
PMV BLD AUTO: 8.1 FL (ref 5–10.5)
PMV BLD AUTO: 8.3 FL (ref 5–10.5)
PO2 BLDA: 63.6 MMHG (ref 75–108)
PO2 BLDV: 118 MMHG (ref 25–40)
POC ACT LR: >400 SEC
POC ACT LR: >400 SEC
POTASSIUM SERPL-SCNC: 3 MMOL/L (ref 3.5–5.1)
POTASSIUM SERPL-SCNC: 3.8 MMOL/L (ref 3.5–5.1)
POTASSIUM SERPL-SCNC: 4 MMOL/L (ref 3.5–5.1)
PROCALCITONIN SERPL IA-MCNC: 0.1 NG/ML (ref 0–0.15)
PROT SERPL-MCNC: 7.1 G/DL (ref 6.4–8.2)
PROT UR STRIP.AUTO-MCNC: 100 MG/DL
RBC # BLD AUTO: 5.54 M/UL (ref 4.2–5.9)
RBC # BLD AUTO: 5.69 M/UL (ref 4.2–5.9)
RBC #/AREA URNS HPF: >100 /HPF (ref 0–4)
SAO2 % BLDA: 92 % (ref 93–100)
SAO2 % BLDV: 99 %
SODIUM SERPL-SCNC: 137 MMOL/L (ref 136–145)
SODIUM SERPL-SCNC: 138 MMOL/L (ref 136–145)
SODIUM SERPL-SCNC: 139 MMOL/L (ref 136–145)
SP GR UR STRIP.AUTO: 1.01 (ref 1–1.03)
TSH SERPL DL<=0.005 MIU/L-ACNC: 2.46 UIU/ML (ref 0.27–4.2)
UA COMPLETE W REFLEX CULTURE PNL UR: ABNORMAL
UA DIPSTICK W REFLEX MICRO PNL UR: YES
URN SPEC COLLECT METH UR: ABNORMAL
UROBILINOGEN UR STRIP-ACNC: 0.2 E.U./DL
WBC # BLD AUTO: 23.8 K/UL (ref 4–11)
WBC # BLD AUTO: 27 K/UL (ref 4–11)
WBC #/AREA URNS HPF: ABNORMAL /HPF (ref 0–5)

## 2025-01-13 PROCEDURE — 97162 PT EVAL MOD COMPLEX 30 MIN: CPT

## 2025-01-13 PROCEDURE — 99222 1ST HOSP IP/OBS MODERATE 55: CPT | Performed by: HOSPITALIST

## 2025-01-13 PROCEDURE — 94761 N-INVAS EAR/PLS OXIMETRY MLT: CPT

## 2025-01-13 PROCEDURE — 71045 X-RAY EXAM CHEST 1 VIEW: CPT

## 2025-01-13 PROCEDURE — 94660 CPAP INITIATION&MGMT: CPT

## 2025-01-13 PROCEDURE — 85025 COMPLETE CBC W/AUTO DIFF WBC: CPT

## 2025-01-13 PROCEDURE — 2500000003 HC RX 250 WO HCPCS

## 2025-01-13 PROCEDURE — 83036 HEMOGLOBIN GLYCOSYLATED A1C: CPT

## 2025-01-13 PROCEDURE — 99223 1ST HOSP IP/OBS HIGH 75: CPT | Performed by: INTERNAL MEDICINE

## 2025-01-13 PROCEDURE — 6370000000 HC RX 637 (ALT 250 FOR IP)

## 2025-01-13 PROCEDURE — 93005 ELECTROCARDIOGRAM TRACING: CPT | Performed by: INTERNAL MEDICINE

## 2025-01-13 PROCEDURE — 92950 HEART/LUNG RESUSCITATION CPR: CPT

## 2025-01-13 PROCEDURE — 80076 HEPATIC FUNCTION PANEL: CPT

## 2025-01-13 PROCEDURE — 2700000000 HC OXYGEN THERAPY PER DAY

## 2025-01-13 PROCEDURE — 2500000003 HC RX 250 WO HCPCS: Performed by: INTERNAL MEDICINE

## 2025-01-13 PROCEDURE — 6360000002 HC RX W HCPCS

## 2025-01-13 PROCEDURE — 2580000003 HC RX 258

## 2025-01-13 PROCEDURE — 84145 PROCALCITONIN (PCT): CPT

## 2025-01-13 PROCEDURE — 6370000000 HC RX 637 (ALT 250 FOR IP): Performed by: INTERNAL MEDICINE

## 2025-01-13 PROCEDURE — 36415 COLL VENOUS BLD VENIPUNCTURE: CPT

## 2025-01-13 PROCEDURE — 85730 THROMBOPLASTIN TIME PARTIAL: CPT

## 2025-01-13 PROCEDURE — 85027 COMPLETE CBC AUTOMATED: CPT

## 2025-01-13 PROCEDURE — 3E043XZ INTRODUCTION OF VASOPRESSOR INTO CENTRAL VEIN, PERCUTANEOUS APPROACH: ICD-10-PCS | Performed by: INTERNAL MEDICINE

## 2025-01-13 PROCEDURE — 97166 OT EVAL MOD COMPLEX 45 MIN: CPT

## 2025-01-13 PROCEDURE — 5A1945Z RESPIRATORY VENTILATION, 24-96 CONSECUTIVE HOURS: ICD-10-PCS | Performed by: INTERNAL MEDICINE

## 2025-01-13 PROCEDURE — 82803 BLOOD GASES ANY COMBINATION: CPT

## 2025-01-13 PROCEDURE — 97530 THERAPEUTIC ACTIVITIES: CPT

## 2025-01-13 PROCEDURE — 5A12012 PERFORMANCE OF CARDIAC OUTPUT, SINGLE, MANUAL: ICD-10-PCS | Performed by: INTERNAL MEDICINE

## 2025-01-13 PROCEDURE — 0BH17EZ INSERTION OF ENDOTRACHEAL AIRWAY INTO TRACHEA, VIA NATURAL OR ARTIFICIAL OPENING: ICD-10-PCS | Performed by: INTERNAL MEDICINE

## 2025-01-13 PROCEDURE — 84443 ASSAY THYROID STIM HORMONE: CPT

## 2025-01-13 PROCEDURE — 85520 HEPARIN ASSAY: CPT

## 2025-01-13 PROCEDURE — 93005 ELECTROCARDIOGRAM TRACING: CPT

## 2025-01-13 PROCEDURE — 97116 GAIT TRAINING THERAPY: CPT

## 2025-01-13 PROCEDURE — 83735 ASSAY OF MAGNESIUM: CPT

## 2025-01-13 PROCEDURE — 80069 RENAL FUNCTION PANEL: CPT

## 2025-01-13 PROCEDURE — 2000000000 HC ICU R&B

## 2025-01-13 RX ORDER — INSULIN LISPRO 100 [IU]/ML
0-8 INJECTION, SOLUTION INTRAVENOUS; SUBCUTANEOUS EVERY 6 HOURS
Status: DISCONTINUED | OUTPATIENT
Start: 2025-01-13 | End: 2025-01-13

## 2025-01-13 RX ORDER — METOPROLOL TARTRATE 25 MG/1
25 TABLET, FILM COATED ORAL 2 TIMES DAILY
Status: DISCONTINUED | OUTPATIENT
Start: 2025-01-13 | End: 2025-01-15

## 2025-01-13 RX ORDER — METOPROLOL TARTRATE 1 MG/ML
5 INJECTION, SOLUTION INTRAVENOUS EVERY 6 HOURS
Status: DISCONTINUED | OUTPATIENT
Start: 2025-01-13 | End: 2025-01-13

## 2025-01-13 RX ORDER — HEPARIN SODIUM 10000 [USP'U]/100ML
5-30 INJECTION, SOLUTION INTRAVENOUS CONTINUOUS
Status: DISCONTINUED | OUTPATIENT
Start: 2025-01-13 | End: 2025-01-13

## 2025-01-13 RX ORDER — MAGNESIUM SULFATE IN WATER 40 MG/ML
2000 INJECTION, SOLUTION INTRAVENOUS ONCE
Status: COMPLETED | OUTPATIENT
Start: 2025-01-13 | End: 2025-01-13

## 2025-01-13 RX ORDER — HEPARIN SODIUM 1000 [USP'U]/ML
2000 INJECTION, SOLUTION INTRAVENOUS; SUBCUTANEOUS PRN
Status: DISCONTINUED | OUTPATIENT
Start: 2025-01-13 | End: 2025-01-13

## 2025-01-13 RX ORDER — POTASSIUM CHLORIDE 7.45 MG/ML
10 INJECTION INTRAVENOUS
Status: COMPLETED | OUTPATIENT
Start: 2025-01-13 | End: 2025-01-13

## 2025-01-13 RX ORDER — INSULIN LISPRO 100 [IU]/ML
0-8 INJECTION, SOLUTION INTRAVENOUS; SUBCUTANEOUS
Status: DISCONTINUED | OUTPATIENT
Start: 2025-01-13 | End: 2025-01-14

## 2025-01-13 RX ORDER — HEPARIN SODIUM 1000 [USP'U]/ML
4000 INJECTION, SOLUTION INTRAVENOUS; SUBCUTANEOUS PRN
Status: DISCONTINUED | OUTPATIENT
Start: 2025-01-13 | End: 2025-01-13

## 2025-01-13 RX ADMIN — METOPROLOL TARTRATE 5 MG: 5 INJECTION INTRAVENOUS at 02:13

## 2025-01-13 RX ADMIN — HEPARIN SODIUM 10 UNITS/KG/HR: 10000 INJECTION, SOLUTION INTRAVENOUS at 02:24

## 2025-01-13 RX ADMIN — POTASSIUM CHLORIDE 10 MEQ: 7.46 INJECTION, SOLUTION INTRAVENOUS at 09:30

## 2025-01-13 RX ADMIN — ASPIRIN 81 MG: 81 TABLET, CHEWABLE ORAL at 09:32

## 2025-01-13 RX ADMIN — INSULIN LISPRO 2 UNITS: 100 INJECTION, SOLUTION INTRAVENOUS; SUBCUTANEOUS at 18:04

## 2025-01-13 RX ADMIN — ROSUVASTATIN CALCIUM 40 MG: 20 TABLET, FILM COATED ORAL at 00:02

## 2025-01-13 RX ADMIN — METOPROLOL TARTRATE 5 MG: 5 INJECTION INTRAVENOUS at 09:51

## 2025-01-13 RX ADMIN — INSULIN LISPRO 6 UNITS: 100 INJECTION, SOLUTION INTRAVENOUS; SUBCUTANEOUS at 00:23

## 2025-01-13 RX ADMIN — POTASSIUM CHLORIDE 10 MEQ: 10 INJECTION, SOLUTION INTRAVENOUS at 03:43

## 2025-01-13 RX ADMIN — FUROSEMIDE 5 MG/HR: 10 INJECTION, SOLUTION INTRAVENOUS at 14:13

## 2025-01-13 RX ADMIN — FAMOTIDINE 20 MG: 10 INJECTION, SOLUTION INTRAVENOUS at 20:22

## 2025-01-13 RX ADMIN — ASPIRIN 324 MG: 81 TABLET, CHEWABLE ORAL at 00:02

## 2025-01-13 RX ADMIN — FAMOTIDINE 20 MG: 10 INJECTION, SOLUTION INTRAVENOUS at 03:51

## 2025-01-13 RX ADMIN — SODIUM CHLORIDE, PRESERVATIVE FREE 10 ML: 5 INJECTION INTRAVENOUS at 19:55

## 2025-01-13 RX ADMIN — MAGNESIUM SULFATE IN WATER 2000 MG: 40 INJECTION, SOLUTION INTRAVENOUS at 07:23

## 2025-01-13 RX ADMIN — POTASSIUM CHLORIDE 10 MEQ: 10 INJECTION, SOLUTION INTRAVENOUS at 02:11

## 2025-01-13 RX ADMIN — POTASSIUM CHLORIDE 10 MEQ: 10 INJECTION, SOLUTION INTRAVENOUS at 00:26

## 2025-01-13 RX ADMIN — POTASSIUM CHLORIDE 10 MEQ: 7.46 INJECTION, SOLUTION INTRAVENOUS at 11:21

## 2025-01-13 RX ADMIN — ROSUVASTATIN CALCIUM 40 MG: 20 TABLET, FILM COATED ORAL at 20:22

## 2025-01-13 RX ADMIN — TICAGRELOR 90 MG: 90 TABLET ORAL at 20:22

## 2025-01-13 RX ADMIN — INSULIN LISPRO 2 UNITS: 100 INJECTION, SOLUTION INTRAVENOUS; SUBCUTANEOUS at 20:22

## 2025-01-13 RX ADMIN — METOPROLOL TARTRATE 25 MG: 25 TABLET, FILM COATED ORAL at 00:01

## 2025-01-13 RX ADMIN — METOPROLOL TARTRATE 25 MG: 25 TABLET, FILM COATED ORAL at 20:22

## 2025-01-13 RX ADMIN — PROCHLORPERAZINE EDISYLATE 10 MG: 5 INJECTION INTRAMUSCULAR; INTRAVENOUS at 01:14

## 2025-01-13 RX ADMIN — INSULIN LISPRO 4 UNITS: 100 INJECTION, SOLUTION INTRAVENOUS; SUBCUTANEOUS at 10:21

## 2025-01-13 ASSESSMENT — PAIN SCALES - GENERAL
PAINLEVEL_OUTOF10: 0

## 2025-01-13 NOTE — CARE COORDINATION
Case Management Assessment  Initial Evaluation    Date/Time of Evaluation: 1/13/2025 1:19 PM  Assessment Completed by: Krystian Bowen RN    If patient is discharged prior to next notation, then this note serves as note for discharge by case management.    Patient Name: Jan Devine                   YOB: 1955  Diagnosis: STEMI (ST elevation myocardial infarction) (HCC) [I21.3]  ST elevation myocardial infarction (STEMI), unspecified artery (HCC) [I21.3]  STEMI involving left anterior descending coronary artery (HCC) [I21.02]                   Date / Time: 1/12/2025  6:22 PM    Patient Admission Status: Inpatient   Readmission Risk (Low < 19, Mod (19-27), High > 27): Readmission Risk Score: 12.7    Current PCP: Ashley Lagunas MD  PCP verified by CM? Yes    Chart Reviewed: Yes      History Provided by: Patient, Medical Record  Patient Orientation: Alert and Oriented    Patient Cognition: Alert    Hospitalization in the last 30 days (Readmission):  No    If yes, Readmission Assessment in  Navigator will be completed and shown below.          Advance Directives:      Code Status: Full Code   Patient's Primary Decision Maker is: Legal Next of Kin    Primary Decision Maker: john devine - Spouse - [phone]    Secondary Decision Maker: felix devine - Child - [phone]    Discharge Planning:    Patient lives with: Spouse/Significant Other Type of Home: House  Primary Care Giver: Self  Patient Support Systems include: Spouse/Significant Other, Children, Family Members   Current Financial resources: Medicare  Current community resources:    Current services prior to admission: C-pap            Current DME:              Type of Home Care services:  None    ADLS  Prior functional level: Independent in ADLs/IADLs  Current functional level: Independent in ADLs/IADLs    PT AM-PAC:   /24  OT AM-PAC:   /24    Family can provide assistance at DC: Yes  Would you like Case Management to discuss the discharge

## 2025-01-13 NOTE — H&P
pressures  No mechanical concerns or concerns for papillary rupture at this time   Patient was placed on BiPAP, as he was 87% on non-rebreather  Initial settings were 15/5 and 80% FiO2  Patient was started on Integrilin drip  Immediate improvement of symptoms  However, given respiratory status and significant acute heart failure, patient was on close watch for possible intra-cardiac assistance devices.      Past Medical History:   Diagnosis Date    COVID 1/24/2022    Diabetes mellitus (HCC)     Gastroesophageal reflux disease without esophagitis     Hypertension     Pneumonia due to COVID-19 virus     Rash        No past surgical history on file.    Social History:   The patient lives at home with wife   Alcohol: Denies  Illicit drugs: Denies  Tobacco:  Never    Family History   Problem Relation Age of Onset    Diabetes Mother     No Known Problems Father          Allergies: No Known Allergies    Review of Systems   Constitutional:  Positive for diaphoresis. Negative for appetite change, chills, fatigue and fever.   HENT: Negative.     Respiratory:  Positive for shortness of breath. Negative for cough.    Cardiovascular:  Positive for chest pain. Negative for palpitations and leg swelling.   Gastrointestinal:  Positive for nausea and vomiting. Negative for abdominal distention and abdominal pain.   Endocrine: Negative.    Genitourinary: Negative.    Skin:  Positive for color change and pallor.   Neurological:  Positive for headaches. Negative for dizziness, weakness and numbness.         OBJECTIVE     MEDICATIONS:  Home meds:  Prior to Admission medications    Medication Sig Start Date End Date Taking? Authorizing Provider   valsartan-hydroCHLOROthiazide (DIOVAN-HCT) 320-25 MG per tablet Take 1 tablet by mouth daily 12/24/24   Ashley Lagunas MD   blood glucose test strips (ONETOUCH VERIO) strip TEST DAILY AS NEEDED 10/25/24   Ashley Lagunas MD   glucose monitoring kit 1 kit by Does not apply route daily 10/25/24

## 2025-01-13 NOTE — SEDATION DOCUMENTATION
Sedation Pre-Procedure Note    Patient Name: Jan Devine   YOB: 1955  Room/Bed: TJ-IR/PL  Medical Record Number: [id number]  Date: 1/12/2025   Time: 7:00 PM       Indication:  Anterior STEMI    Consent: I have discussed with the patient and/or the patient representative the indication, alternatives, and the possible risks and/or complications of the planned procedure and the anesthesia methods.  Risks including MI, cardiac failure, cardiac arrest and other fatal and nonfatal dysrhythmias, excessive bleeding, stroke and others discussed at length with the patient the patient and/or patient representative appear to understand and agree to proceed.    Vital Signs:   Vitals:    01/12/25 1842   BP: (!) 135/92   Pulse:    Resp:    Temp:    SpO2: 98%       Past Medical History:   has a past medical history of COVID, Diabetes mellitus (HCC), Gastroesophageal reflux disease without esophagitis, Hypertension, Pneumonia due to COVID-19 virus, and Rash.    Past Surgical History:   has no past surgical history on file.    Medications:   Scheduled Meds:    ticagrelor  90 mg Oral Once    heparin (porcine)  5,000 Units IntraVENous Once     Continuous Infusions:   PRN Meds: nitroGLYCERIN  Home Meds:   Prior to Admission medications    Medication Sig Start Date End Date Taking? Authorizing Provider   valsartan-hydroCHLOROthiazide (DIOVAN-HCT) 320-25 MG per tablet Take 1 tablet by mouth daily 12/24/24   Ashley Lagunas MD   blood glucose test strips (ONETOUCH VERIO) strip TEST DAILY AS NEEDED 10/25/24   Ashley Lagunas MD   glucose monitoring kit 1 kit by Does not apply route daily 10/25/24   Ashley Lagunas MD [employer] Lancets MISC 1 each by Does not apply route daily 10/25/24   Ashley Lagunas MD   Continuous Glucose Sensor (FREESTYLE SHAISTA 3 SENSOR) MISC Use daily as needed. 10/8/24   Ashley Lagunas MD   metFORMIN (GLUCOPHAGE-XR) 500 MG extended release tablet Take 1 tablet by mouth daily (with breakfast)

## 2025-01-14 ENCOUNTER — APPOINTMENT (OUTPATIENT)
Dept: GENERAL RADIOLOGY | Age: 70
DRG: 321 | End: 2025-01-14
Payer: MEDICARE

## 2025-01-14 ENCOUNTER — APPOINTMENT (OUTPATIENT)
Age: 70
DRG: 321 | End: 2025-01-14
Attending: INTERNAL MEDICINE
Payer: MEDICARE

## 2025-01-14 PROBLEM — I49.01 VENTRICULAR FIBRILLATION: Status: ACTIVE | Noted: 2025-01-14

## 2025-01-14 LAB
ALBUMIN SERPL-MCNC: 2.8 G/DL (ref 3.4–5)
ALBUMIN SERPL-MCNC: 3.6 G/DL (ref 3.4–5)
ALBUMIN SERPL-MCNC: 3.7 G/DL (ref 3.4–5)
ALBUMIN SERPL-MCNC: 3.7 G/DL (ref 3.4–5)
ALBUMIN SERPL-MCNC: 3.8 G/DL (ref 3.4–5)
ALBUMIN SERPL-MCNC: 3.9 G/DL (ref 3.4–5)
ANION GAP SERPL CALCULATED.3IONS-SCNC: 10 MMOL/L (ref 3–16)
ANION GAP SERPL CALCULATED.3IONS-SCNC: 11 MMOL/L (ref 3–16)
ANION GAP SERPL CALCULATED.3IONS-SCNC: 11 MMOL/L (ref 3–16)
ANION GAP SERPL CALCULATED.3IONS-SCNC: 14 MMOL/L (ref 3–16)
ANION GAP SERPL CALCULATED.3IONS-SCNC: 14 MMOL/L (ref 3–16)
ANION GAP SERPL CALCULATED.3IONS-SCNC: 8 MMOL/L (ref 3–16)
BASE EXCESS BLDA CALC-SCNC: 9 MMOL/L (ref -3–3)
BASE EXCESS BLDV CALC-SCNC: 10 MMOL/L (ref -3–3)
BASOPHILS # BLD: 0.1 K/UL (ref 0–0.2)
BASOPHILS NFR BLD: 0.4 %
BUN SERPL-MCNC: 25 MG/DL (ref 7–20)
BUN SERPL-MCNC: 25 MG/DL (ref 7–20)
BUN SERPL-MCNC: 29 MG/DL (ref 7–20)
BUN SERPL-MCNC: 32 MG/DL (ref 7–20)
BUN SERPL-MCNC: 32 MG/DL (ref 7–20)
BUN SERPL-MCNC: 33 MG/DL (ref 7–20)
CA-I BLD-SCNC: 1.15 MMOL/L (ref 1.12–1.32)
CA-I BLD-SCNC: 1.16 MMOL/L (ref 1.12–1.32)
CALCIUM SERPL-MCNC: 6.1 MG/DL (ref 8.3–10.6)
CALCIUM SERPL-MCNC: 8.6 MG/DL (ref 8.3–10.6)
CALCIUM SERPL-MCNC: 8.8 MG/DL (ref 8.3–10.6)
CALCIUM SERPL-MCNC: 9.2 MG/DL (ref 8.3–10.6)
CHLORIDE SERPL-SCNC: 110 MMOL/L (ref 99–110)
CHLORIDE SERPL-SCNC: 95 MMOL/L (ref 99–110)
CHLORIDE SERPL-SCNC: 95 MMOL/L (ref 99–110)
CHLORIDE SERPL-SCNC: 98 MMOL/L (ref 99–110)
CO2 BLDA-SCNC: 34 MMOL/L
CO2 BLDV-SCNC: 37 MMOL/L
CO2 SERPL-SCNC: 23 MMOL/L (ref 21–32)
CO2 SERPL-SCNC: 26 MMOL/L (ref 21–32)
CO2 SERPL-SCNC: 29 MMOL/L (ref 21–32)
CO2 SERPL-SCNC: 29 MMOL/L (ref 21–32)
CO2 SERPL-SCNC: 31 MMOL/L (ref 21–32)
CO2 SERPL-SCNC: 31 MMOL/L (ref 21–32)
CREAT SERPL-MCNC: 1 MG/DL (ref 0.8–1.3)
CREAT SERPL-MCNC: 1.3 MG/DL (ref 0.8–1.3)
CREAT SERPL-MCNC: 1.4 MG/DL (ref 0.8–1.3)
CREAT SERPL-MCNC: 1.4 MG/DL (ref 0.8–1.3)
CREAT SERPL-MCNC: 1.5 MG/DL (ref 0.8–1.3)
CREAT SERPL-MCNC: 1.6 MG/DL (ref 0.8–1.3)
DEPRECATED RDW RBC AUTO: 14.2 % (ref 12.4–15.4)
ECHO AO ASC DIAM: 3.8 CM
ECHO AO ASCENDING AORTA INDEX: 1.54 CM/M2
ECHO AO ROOT DIAM: 3.5 CM
ECHO AO ROOT INDEX: 1.42 CM/M2
ECHO AV AREA PEAK VELOCITY: 2.4 CM2
ECHO AV AREA VTI: 2.5 CM2
ECHO AV AREA/BSA PEAK VELOCITY: 1 CM2/M2
ECHO AV AREA/BSA VTI: 1 CM2/M2
ECHO AV MEAN GRADIENT: 3 MMHG
ECHO AV MEAN VELOCITY: 0.8 M/S
ECHO AV PEAK GRADIENT: 7 MMHG
ECHO AV PEAK VELOCITY: 1.3 M/S
ECHO AV VELOCITY RATIO: 0.85
ECHO AV VTI: 22.4 CM
ECHO BSA: 2.55 M2
ECHO LA AREA 2C: 26.4 CM2
ECHO LA AREA 4C: 20.2 CM2
ECHO LA MAJOR AXIS: 6.3 CM
ECHO LA MINOR AXIS: 6.8 CM
ECHO LA VOL BP: 68 ML (ref 18–58)
ECHO LA VOL MOD A2C: 84 ML (ref 18–58)
ECHO LA VOL MOD A4C: 51 ML (ref 18–58)
ECHO LA VOL/BSA BIPLANE: 28 ML/M2 (ref 16–34)
ECHO LA VOLUME INDEX MOD A2C: 34 ML/M2 (ref 16–34)
ECHO LA VOLUME INDEX MOD A4C: 21 ML/M2 (ref 16–34)
ECHO LV E' LATERAL VELOCITY: 7.18 CM/S
ECHO LV E' SEPTAL VELOCITY: 5.33 CM/S
ECHO LV EF PHYSICIAN: 38 %
ECHO LV FRACTIONAL SHORTENING: 38 % (ref 28–44)
ECHO LV INTERNAL DIMENSION DIASTOLE INDEX: 1.38 CM/M2
ECHO LV INTERNAL DIMENSION DIASTOLIC: 3.4 CM (ref 4.2–5.9)
ECHO LV INTERNAL DIMENSION SYSTOLIC INDEX: 0.85 CM/M2
ECHO LV INTERNAL DIMENSION SYSTOLIC: 2.1 CM
ECHO LV IVSD: 1.3 CM (ref 0.6–1)
ECHO LV MASS 2D: 147.6 G (ref 88–224)
ECHO LV MASS INDEX 2D: 60 G/M2 (ref 49–115)
ECHO LV POSTERIOR WALL DIASTOLIC: 1.3 CM (ref 0.6–1)
ECHO LV RELATIVE WALL THICKNESS RATIO: 0.76
ECHO LVOT AREA: 2.8 CM2
ECHO LVOT AV VTI INDEX: 0.88
ECHO LVOT DIAM: 1.9 CM
ECHO LVOT MEAN GRADIENT: 2 MMHG
ECHO LVOT PEAK GRADIENT: 5 MMHG
ECHO LVOT PEAK VELOCITY: 1.1 M/S
ECHO LVOT STROKE VOLUME INDEX: 22.6 ML/M2
ECHO LVOT SV: 55.5 ML
ECHO LVOT VTI: 19.6 CM
ECHO MV A VELOCITY: 0.72 M/S
ECHO MV E DECELERATION TIME (DT): 201 MS
ECHO MV E VELOCITY: 0.68 M/S
ECHO MV E/A RATIO: 0.94
ECHO MV E/E' LATERAL: 9.47
ECHO MV E/E' RATIO (AVERAGED): 11.11
ECHO MV E/E' SEPTAL: 12.76
ECHO PV ACCELERATION TIME (AT): 116 MS
ECHO PV MAX VELOCITY: 1 M/S
ECHO PV PEAK GRADIENT: 4 MMHG
ECHO RA AREA 4C: 19.2 CM2
ECHO RA END SYSTOLIC VOLUME APICAL 4 CHAMBER INDEX BSA: 22 ML/M2
ECHO RA VOLUME: 55 ML
ECHO RV FREE WALL PEAK S': 15.3 CM/S
ECHO RV TAPSE: 2.1 CM (ref 1.7–?)
EKG ATRIAL RATE: 73 BPM
EKG ATRIAL RATE: 77 BPM
EKG ATRIAL RATE: 83 BPM
EKG DIAGNOSIS: NORMAL
EKG P AXIS: 41 DEGREES
EKG P AXIS: 53 DEGREES
EKG P-R INTERVAL: 186 MS
EKG P-R INTERVAL: 192 MS
EKG Q-T INTERVAL: 334 MS
EKG Q-T INTERVAL: 434 MS
EKG Q-T INTERVAL: 446 MS
EKG QRS DURATION: 104 MS
EKG QRS DURATION: 92 MS
EKG QRS DURATION: 96 MS
EKG QTC CALCULATION (BAZETT): 474 MS
EKG QTC CALCULATION (BAZETT): 478 MS
EKG QTC CALCULATION (BAZETT): 524 MS
EKG R AXIS: 54 DEGREES
EKG R AXIS: 54 DEGREES
EKG R AXIS: 93 DEGREES
EKG T AXIS: 103 DEGREES
EKG T AXIS: 31 DEGREES
EKG T AXIS: 70 DEGREES
EKG VENTRICULAR RATE: 121 BPM
EKG VENTRICULAR RATE: 73 BPM
EKG VENTRICULAR RATE: 83 BPM
EOSINOPHIL # BLD: 0.1 K/UL (ref 0–0.6)
EOSINOPHIL NFR BLD: 0.2 %
EST. AVERAGE GLUCOSE BLD GHB EST-MCNC: 182.9 MG/DL
EST. AVERAGE GLUCOSE BLD GHB EST-MCNC: 185.8 MG/DL
GFR SERPLBLD CREATININE-BSD FMLA CKD-EPI: 46 ML/MIN/{1.73_M2}
GFR SERPLBLD CREATININE-BSD FMLA CKD-EPI: 49 ML/MIN/{1.73_M2}
GFR SERPLBLD CREATININE-BSD FMLA CKD-EPI: 53 ML/MIN/{1.73_M2}
GFR SERPLBLD CREATININE-BSD FMLA CKD-EPI: 53 ML/MIN/{1.73_M2}
GFR SERPLBLD CREATININE-BSD FMLA CKD-EPI: 58 ML/MIN/{1.73_M2}
GFR SERPLBLD CREATININE-BSD FMLA CKD-EPI: 80 ML/MIN/{1.73_M2}
GLUCOSE BLD-MCNC: 163 MG/DL (ref 70–99)
GLUCOSE BLD-MCNC: 199 MG/DL (ref 70–99)
GLUCOSE BLD-MCNC: 233 MG/DL (ref 70–99)
GLUCOSE BLD-MCNC: 243 MG/DL (ref 70–99)
GLUCOSE BLD-MCNC: 310 MG/DL (ref 70–99)
GLUCOSE SERPL-MCNC: 154 MG/DL (ref 70–99)
GLUCOSE SERPL-MCNC: 182 MG/DL (ref 70–99)
GLUCOSE SERPL-MCNC: 189 MG/DL (ref 70–99)
GLUCOSE SERPL-MCNC: 193 MG/DL (ref 70–99)
GLUCOSE SERPL-MCNC: 198 MG/DL (ref 70–99)
GLUCOSE SERPL-MCNC: 288 MG/DL (ref 70–99)
HBA1C MFR BLD: 8 %
HBA1C MFR BLD: 8.1 %
HCO3 BLDA-SCNC: 32.8 MMOL/L (ref 21–29)
HCO3 BLDV-SCNC: 35.2 MMOL/L (ref 23–29)
HCT VFR BLD AUTO: 52.2 % (ref 40.5–52.5)
HGB BLD-MCNC: 17.2 G/DL (ref 13.5–17.5)
LACTATE BLD-SCNC: 1.72 MMOL/L (ref 0.4–2)
LACTATE BLD-SCNC: 3.73 MMOL/L (ref 0.4–2)
LACTATE BLDV-SCNC: 1.5 MMOL/L (ref 0.4–2)
LACTATE BLDV-SCNC: 1.5 MMOL/L (ref 0.4–2)
LACTATE BLDV-SCNC: 1.8 MMOL/L (ref 0.4–2)
LACTATE BLDV-SCNC: 2.4 MMOL/L (ref 0.4–2)
LACTATE BLDV-SCNC: 4.5 MMOL/L (ref 0.4–2)
LYMPHOCYTES # BLD: 7.3 K/UL (ref 1–5.1)
LYMPHOCYTES NFR BLD: 22.7 %
MAGNESIUM SERPL-MCNC: 1.99 MG/DL (ref 1.8–2.4)
MAGNESIUM SERPL-MCNC: 2.11 MG/DL (ref 1.8–2.4)
MAGNESIUM SERPL-MCNC: 2.13 MG/DL (ref 1.8–2.4)
MAGNESIUM SERPL-MCNC: 2.4 MG/DL (ref 1.8–2.4)
MAGNESIUM SERPL-MCNC: 2.51 MG/DL (ref 1.8–2.4)
MAGNESIUM SERPL-MCNC: 2.55 MG/DL (ref 1.8–2.4)
MCH RBC QN AUTO: 30.5 PG (ref 26–34)
MCHC RBC AUTO-ENTMCNC: 33 G/DL (ref 31–36)
MCV RBC AUTO: 92.4 FL (ref 80–100)
MONOCYTES # BLD: 2.4 K/UL (ref 0–1.3)
MONOCYTES NFR BLD: 7.5 %
NEUTROPHILS # BLD: 22.3 K/UL (ref 1.7–7.7)
NEUTROPHILS NFR BLD: 69.2 %
PCO2 BLDA: 45.4 MM HG (ref 35–45)
PCO2 BLDV: 58.2 MM HG (ref 40–50)
PERFORMED ON: ABNORMAL
PERFORMED ON: NORMAL
PH BLDA: 7.47 [PH] (ref 7.35–7.45)
PH BLDV: 7.39 [PH] (ref 7.35–7.45)
PHOSPHATE SERPL-MCNC: 0.9 MG/DL (ref 2.5–4.9)
PHOSPHATE SERPL-MCNC: 1.4 MG/DL (ref 2.5–4.9)
PHOSPHATE SERPL-MCNC: 1.4 MG/DL (ref 2.5–4.9)
PHOSPHATE SERPL-MCNC: 1.6 MG/DL (ref 2.5–4.9)
PHOSPHATE SERPL-MCNC: 2 MG/DL (ref 2.5–4.9)
PHOSPHATE SERPL-MCNC: 2.2 MG/DL (ref 2.5–4.9)
PHOSPHATE SERPL-MCNC: 2.6 MG/DL (ref 2.5–4.9)
PLATELET # BLD AUTO: 298 K/UL (ref 135–450)
PMV BLD AUTO: 8.1 FL (ref 5–10.5)
PO2 BLDA: 177.2 MM HG (ref 75–108)
PO2 BLDV: 32 MM HG
POC SAMPLE TYPE: ABNORMAL
POC SAMPLE TYPE: ABNORMAL
POC SAMPLE TYPE: NORMAL
POTASSIUM BLD-SCNC: 3 MMOL/L (ref 3.5–5.1)
POTASSIUM BLD-SCNC: 3.6 MMOL/L (ref 3.5–5.1)
POTASSIUM BLD-SCNC: 4 MMOL/L (ref 3.5–5.1)
POTASSIUM SERPL-SCNC: 2.4 MMOL/L (ref 3.5–5.1)
POTASSIUM SERPL-SCNC: 2.5 MMOL/L (ref 3.5–5.1)
POTASSIUM SERPL-SCNC: 3.7 MMOL/L (ref 3.5–5.1)
POTASSIUM SERPL-SCNC: 3.9 MMOL/L (ref 3.5–5.1)
POTASSIUM SERPL-SCNC: 3.9 MMOL/L (ref 3.5–5.1)
POTASSIUM SERPL-SCNC: 4.1 MMOL/L (ref 3.5–5.1)
POTASSIUM SERPL-SCNC: 4.2 MMOL/L (ref 3.5–5.1)
RBC # BLD AUTO: 5.65 M/UL (ref 4.2–5.9)
SAO2 % BLDA: 100 % (ref 93–100)
SAO2 % BLDV: 58 %
SODIUM BLD-SCNC: 139 MMOL/L (ref 136–145)
SODIUM BLD-SCNC: 141 MMOL/L (ref 136–145)
SODIUM SERPL-SCNC: 136 MMOL/L (ref 136–145)
SODIUM SERPL-SCNC: 138 MMOL/L (ref 136–145)
SODIUM SERPL-SCNC: 140 MMOL/L (ref 136–145)
SODIUM SERPL-SCNC: 141 MMOL/L (ref 136–145)
WBC # BLD AUTO: 32.3 K/UL (ref 4–11)

## 2025-01-14 PROCEDURE — 6370000000 HC RX 637 (ALT 250 FOR IP)

## 2025-01-14 PROCEDURE — 92950 HEART/LUNG RESUSCITATION CPR: CPT

## 2025-01-14 PROCEDURE — 2500000003 HC RX 250 WO HCPCS

## 2025-01-14 PROCEDURE — 2580000003 HC RX 258

## 2025-01-14 PROCEDURE — 99291 CRITICAL CARE FIRST HOUR: CPT | Performed by: INTERNAL MEDICINE

## 2025-01-14 PROCEDURE — 74018 RADEX ABDOMEN 1 VIEW: CPT

## 2025-01-14 PROCEDURE — 82803 BLOOD GASES ANY COMBINATION: CPT

## 2025-01-14 PROCEDURE — P9045 ALBUMIN (HUMAN), 5%, 250 ML: HCPCS | Performed by: INTERNAL MEDICINE

## 2025-01-14 PROCEDURE — 31500 INSERT EMERGENCY AIRWAY: CPT

## 2025-01-14 PROCEDURE — 83036 HEMOGLOBIN GLYCOSYLATED A1C: CPT

## 2025-01-14 PROCEDURE — 71045 X-RAY EXAM CHEST 1 VIEW: CPT

## 2025-01-14 PROCEDURE — C8929 TTE W OR WO FOL WCON,DOPPLER: HCPCS

## 2025-01-14 PROCEDURE — 2000000000 HC ICU R&B

## 2025-01-14 PROCEDURE — 84132 ASSAY OF SERUM POTASSIUM: CPT

## 2025-01-14 PROCEDURE — 6360000002 HC RX W HCPCS

## 2025-01-14 PROCEDURE — 83605 ASSAY OF LACTIC ACID: CPT

## 2025-01-14 PROCEDURE — 82330 ASSAY OF CALCIUM: CPT

## 2025-01-14 PROCEDURE — 82947 ASSAY GLUCOSE BLOOD QUANT: CPT

## 2025-01-14 PROCEDURE — 6360000004 HC RX CONTRAST MEDICATION: Performed by: INTERNAL MEDICINE

## 2025-01-14 PROCEDURE — 93306 TTE W/DOPPLER COMPLETE: CPT | Performed by: INTERNAL MEDICINE

## 2025-01-14 PROCEDURE — 36556 INSERT NON-TUNNEL CV CATH: CPT

## 2025-01-14 PROCEDURE — 36415 COLL VENOUS BLD VENIPUNCTURE: CPT

## 2025-01-14 PROCEDURE — 6360000002 HC RX W HCPCS: Performed by: INTERNAL MEDICINE

## 2025-01-14 PROCEDURE — 2700000000 HC OXYGEN THERAPY PER DAY

## 2025-01-14 PROCEDURE — 93005 ELECTROCARDIOGRAM TRACING: CPT

## 2025-01-14 PROCEDURE — 02HV33Z INSERTION OF INFUSION DEVICE INTO SUPERIOR VENA CAVA, PERCUTANEOUS APPROACH: ICD-10-PCS

## 2025-01-14 PROCEDURE — 87070 CULTURE OTHR SPECIMN AEROBIC: CPT

## 2025-01-14 PROCEDURE — 80069 RENAL FUNCTION PANEL: CPT

## 2025-01-14 PROCEDURE — 94002 VENT MGMT INPAT INIT DAY: CPT

## 2025-01-14 PROCEDURE — 87205 SMEAR GRAM STAIN: CPT

## 2025-01-14 PROCEDURE — 93010 ELECTROCARDIOGRAM REPORT: CPT | Performed by: INTERNAL MEDICINE

## 2025-01-14 PROCEDURE — 84100 ASSAY OF PHOSPHORUS: CPT

## 2025-01-14 PROCEDURE — 2500000003 HC RX 250 WO HCPCS: Performed by: HOSPITALIST

## 2025-01-14 PROCEDURE — 83735 ASSAY OF MAGNESIUM: CPT

## 2025-01-14 PROCEDURE — 84295 ASSAY OF SERUM SODIUM: CPT

## 2025-01-14 PROCEDURE — 94761 N-INVAS EAR/PLS OXIMETRY MLT: CPT

## 2025-01-14 PROCEDURE — 85025 COMPLETE CBC W/AUTO DIFF WBC: CPT

## 2025-01-14 PROCEDURE — 6370000000 HC RX 637 (ALT 250 FOR IP): Performed by: INTERNAL MEDICINE

## 2025-01-14 PROCEDURE — 2580000003 HC RX 258: Performed by: INTERNAL MEDICINE

## 2025-01-14 RX ORDER — INSULIN LISPRO 100 [IU]/ML
0-8 INJECTION, SOLUTION INTRAVENOUS; SUBCUTANEOUS
Status: DISCONTINUED | OUTPATIENT
Start: 2025-01-14 | End: 2025-01-14

## 2025-01-14 RX ORDER — POTASSIUM CHLORIDE 29.8 MG/ML
20 INJECTION INTRAVENOUS
Status: DISCONTINUED | OUTPATIENT
Start: 2025-01-14 | End: 2025-01-14

## 2025-01-14 RX ORDER — POTASSIUM CHLORIDE 7.45 MG/ML
10 INJECTION INTRAVENOUS
Status: COMPLETED | OUTPATIENT
Start: 2025-01-14 | End: 2025-01-14

## 2025-01-14 RX ORDER — POTASSIUM CHLORIDE 29.8 MG/ML
20 INJECTION INTRAVENOUS ONCE
Status: COMPLETED | OUTPATIENT
Start: 2025-01-14 | End: 2025-01-14

## 2025-01-14 RX ORDER — NOREPINEPHRINE BITARTRATE 0.06 MG/ML
1-100 INJECTION, SOLUTION INTRAVENOUS CONTINUOUS
Status: DISCONTINUED | OUTPATIENT
Start: 2025-01-14 | End: 2025-01-17

## 2025-01-14 RX ORDER — POTASSIUM CHLORIDE 29.8 MG/ML
INJECTION INTRAVENOUS
Status: COMPLETED
Start: 2025-01-14 | End: 2025-01-14

## 2025-01-14 RX ORDER — FENTANYL CITRATE-0.9 % NACL/PF 10 MCG/ML
25-200 PLASTIC BAG, INJECTION (ML) INTRAVENOUS CONTINUOUS
Status: DISCONTINUED | OUTPATIENT
Start: 2025-01-14 | End: 2025-01-16 | Stop reason: ALTCHOICE

## 2025-01-14 RX ORDER — LIDOCAINE HYDROCHLORIDE ANHYDROUS AND DEXTROSE MONOHYDRATE 5; 400 G/100ML; MG/100ML
1 INJECTION, SOLUTION INTRAVENOUS CONTINUOUS
Status: DISCONTINUED | OUTPATIENT
Start: 2025-01-14 | End: 2025-01-14

## 2025-01-14 RX ORDER — POLYETHYLENE GLYCOL 3350 17 G/17G
17 POWDER, FOR SOLUTION ORAL DAILY
Status: DISCONTINUED | OUTPATIENT
Start: 2025-01-14 | End: 2025-01-16

## 2025-01-14 RX ORDER — ROCURONIUM BROMIDE 10 MG/ML
INJECTION, SOLUTION INTRAVENOUS
Status: DISCONTINUED
Start: 2025-01-14 | End: 2025-01-14 | Stop reason: WASHOUT

## 2025-01-14 RX ORDER — ALBUMIN HUMAN 50 G/1000ML
25 SOLUTION INTRAVENOUS ONCE
Status: COMPLETED | OUTPATIENT
Start: 2025-01-14 | End: 2025-01-14

## 2025-01-14 RX ORDER — PROPOFOL 10 MG/ML
5-50 INJECTION, EMULSION INTRAVENOUS CONTINUOUS
Status: DISCONTINUED | OUTPATIENT
Start: 2025-01-14 | End: 2025-01-16 | Stop reason: ALTCHOICE

## 2025-01-14 RX ORDER — INSULIN LISPRO 100 [IU]/ML
0-8 INJECTION, SOLUTION INTRAVENOUS; SUBCUTANEOUS EVERY 6 HOURS
Status: DISCONTINUED | OUTPATIENT
Start: 2025-01-14 | End: 2025-01-18

## 2025-01-14 RX ORDER — POTASSIUM CHLORIDE 29.8 MG/ML
20 INJECTION INTRAVENOUS
Status: COMPLETED | OUTPATIENT
Start: 2025-01-15 | End: 2025-01-15

## 2025-01-14 RX ORDER — ETOMIDATE 2 MG/ML
INJECTION INTRAVENOUS
Status: COMPLETED
Start: 2025-01-14 | End: 2025-01-14

## 2025-01-14 RX ORDER — PROPOFOL 10 MG/ML
INJECTION, EMULSION INTRAVENOUS
Status: COMPLETED
Start: 2025-01-14 | End: 2025-01-14

## 2025-01-14 RX ORDER — ETOMIDATE 2 MG/ML
INJECTION INTRAVENOUS
Status: DISCONTINUED
Start: 2025-01-14 | End: 2025-01-14 | Stop reason: WASHOUT

## 2025-01-14 RX ORDER — MIDAZOLAM HYDROCHLORIDE 1 MG/ML
INJECTION, SOLUTION INTRAMUSCULAR; INTRAVENOUS
Status: COMPLETED
Start: 2025-01-14 | End: 2025-01-14

## 2025-01-14 RX ORDER — SUCCINYLCHOLINE CHLORIDE 20 MG/ML
INJECTION INTRAMUSCULAR; INTRAVENOUS
Status: DISCONTINUED
Start: 2025-01-14 | End: 2025-01-14 | Stop reason: WASHOUT

## 2025-01-14 RX ORDER — MAGNESIUM SULFATE IN WATER 40 MG/ML
2000 INJECTION, SOLUTION INTRAVENOUS ONCE
Status: COMPLETED | OUTPATIENT
Start: 2025-01-14 | End: 2025-01-14

## 2025-01-14 RX ORDER — INSULIN LISPRO 100 [IU]/ML
0-16 INJECTION, SOLUTION INTRAVENOUS; SUBCUTANEOUS
Status: DISCONTINUED | OUTPATIENT
Start: 2025-01-14 | End: 2025-01-14

## 2025-01-14 RX ADMIN — MIDAZOLAM 10 MG: 1 INJECTION INTRAMUSCULAR; INTRAVENOUS at 04:53

## 2025-01-14 RX ADMIN — POTASSIUM CHLORIDE 20 MEQ: 29.8 INJECTION INTRAVENOUS at 16:08

## 2025-01-14 RX ADMIN — AMIODARONE HYDROCHLORIDE 1 MG/MIN: 1.8 INJECTION, SOLUTION INTRAVENOUS at 02:34

## 2025-01-14 RX ADMIN — POTASSIUM CHLORIDE 20 MEQ: 29.8 INJECTION, SOLUTION INTRAVENOUS at 05:20

## 2025-01-14 RX ADMIN — FAMOTIDINE 20 MG: 10 INJECTION, SOLUTION INTRAVENOUS at 20:33

## 2025-01-14 RX ADMIN — ASPIRIN 81 MG: 81 TABLET, CHEWABLE ORAL at 08:32

## 2025-01-14 RX ADMIN — PROPOFOL 1000 MG: 10 INJECTION, EMULSION INTRAVENOUS at 06:11

## 2025-01-14 RX ADMIN — POTASSIUM CHLORIDE 20 MEQ: 400 INJECTION, SOLUTION INTRAVENOUS at 08:28

## 2025-01-14 RX ADMIN — MAGNESIUM SULFATE HEPTAHYDRATE 2000 MG: 40 INJECTION, SOLUTION INTRAVENOUS at 09:52

## 2025-01-14 RX ADMIN — POTASSIUM CHLORIDE 20 MEQ: 29.8 INJECTION, SOLUTION INTRAVENOUS at 03:46

## 2025-01-14 RX ADMIN — TICAGRELOR 90 MG: 90 TABLET ORAL at 20:34

## 2025-01-14 RX ADMIN — POTASSIUM CHLORIDE 20 MEQ: 400 INJECTION, SOLUTION INTRAVENOUS at 18:58

## 2025-01-14 RX ADMIN — POTASSIUM PHOSPHATE, MONOBASIC AND POTASSIUM PHOSPHATE, DIBASIC 15 MMOL: 224; 236 INJECTION, SOLUTION, CONCENTRATE INTRAVENOUS at 17:04

## 2025-01-14 RX ADMIN — SULFUR HEXAFLUORIDE 2 ML: 60.7; .19; .19 INJECTION, POWDER, LYOPHILIZED, FOR SUSPENSION INTRAVENOUS; INTRAVESICAL at 10:11

## 2025-01-14 RX ADMIN — POTASSIUM CHLORIDE 20 MEQ: 29.8 INJECTION INTRAVENOUS at 08:28

## 2025-01-14 RX ADMIN — INSULIN LISPRO 6 UNITS: 100 INJECTION, SOLUTION INTRAVENOUS; SUBCUTANEOUS at 08:14

## 2025-01-14 RX ADMIN — INSULIN LISPRO 4 UNITS: 100 INJECTION, SOLUTION INTRAVENOUS; SUBCUTANEOUS at 10:46

## 2025-01-14 RX ADMIN — PROPOFOL 30 MCG/KG/MIN: 10 INJECTION, EMULSION INTRAVENOUS at 22:34

## 2025-01-14 RX ADMIN — POTASSIUM CHLORIDE 20 MEQ: 29.8 INJECTION, SOLUTION INTRAVENOUS at 11:02

## 2025-01-14 RX ADMIN — POLYETHYLENE GLYCOL 3350 17 G: 17 POWDER, FOR SOLUTION ORAL at 10:47

## 2025-01-14 RX ADMIN — INSULIN LISPRO 2 UNITS: 100 INJECTION, SOLUTION INTRAVENOUS; SUBCUTANEOUS at 17:10

## 2025-01-14 RX ADMIN — PROPOFOL 30 MCG/KG/MIN: 10 INJECTION, EMULSION INTRAVENOUS at 23:45

## 2025-01-14 RX ADMIN — Medication 50 MCG/HR: at 06:38

## 2025-01-14 RX ADMIN — SODIUM CHLORIDE: 9 INJECTION, SOLUTION INTRAVENOUS at 08:27

## 2025-01-14 RX ADMIN — TICAGRELOR 90 MG: 90 TABLET ORAL at 08:30

## 2025-01-14 RX ADMIN — FAMOTIDINE 20 MG: 10 INJECTION, SOLUTION INTRAVENOUS at 08:30

## 2025-01-14 RX ADMIN — POTASSIUM CHLORIDE 10 MEQ: 7.46 INJECTION, SOLUTION INTRAVENOUS at 00:33

## 2025-01-14 RX ADMIN — ROSUVASTATIN CALCIUM 40 MG: 20 TABLET, FILM COATED ORAL at 20:33

## 2025-01-14 RX ADMIN — ALBUMIN (HUMAN) 25 G: 12.5 INJECTION, SOLUTION INTRAVENOUS at 12:20

## 2025-01-14 RX ADMIN — ETOMIDATE: 2 INJECTION, SOLUTION INTRAVENOUS at 04:51

## 2025-01-14 RX ADMIN — POTASSIUM CHLORIDE 20 MEQ: 400 INJECTION, SOLUTION INTRAVENOUS at 16:08

## 2025-01-14 RX ADMIN — PROPOFOL 20 MCG/KG/MIN: 10 INJECTION, EMULSION INTRAVENOUS at 19:10

## 2025-01-14 RX ADMIN — POTASSIUM CHLORIDE 20 MEQ: 29.8 INJECTION INTRAVENOUS at 18:58

## 2025-01-14 RX ADMIN — POTASSIUM BICARBONATE 40 MEQ: 782 TABLET, EFFERVESCENT ORAL at 00:34

## 2025-01-14 RX ADMIN — POTASSIUM CHLORIDE 10 MEQ: 7.46 INJECTION, SOLUTION INTRAVENOUS at 02:02

## 2025-01-14 RX ADMIN — Medication 75 MCG/HR: at 21:56

## 2025-01-14 RX ADMIN — AMIODARONE HYDROCHLORIDE 1 MG/MIN: 1.8 INJECTION, SOLUTION INTRAVENOUS at 08:01

## 2025-01-14 RX ADMIN — POTASSIUM CHLORIDE 20 MEQ: 29.8 INJECTION, SOLUTION INTRAVENOUS at 04:13

## 2025-01-14 RX ADMIN — PROPOFOL 15 MCG/KG/MIN: 10 INJECTION, EMULSION INTRAVENOUS at 10:18

## 2025-01-14 ASSESSMENT — PULMONARY FUNCTION TESTS
PIF_VALUE: 22
PIF_VALUE: 22
PIF_VALUE: 24
PIF_VALUE: 21
PIF_VALUE: 24
PIF_VALUE: 23
PIF_VALUE: 22
PIF_VALUE: 22
PIF_VALUE: 23
PIF_VALUE: 23
PIF_VALUE: 25
PIF_VALUE: 22
PIF_VALUE: 23
PIF_VALUE: 21
PIF_VALUE: 22
PIF_VALUE: 28
PIF_VALUE: 24
PIF_VALUE: 23
PIF_VALUE: 28
PIF_VALUE: 23
PIF_VALUE: 24
PIF_VALUE: 27
PIF_VALUE: 23
PIF_VALUE: 24
PIF_VALUE: 22
PIF_VALUE: 23
PIF_VALUE: 21
PIF_VALUE: 22
PIF_VALUE: 23
PIF_VALUE: 22
PIF_VALUE: 31
PIF_VALUE: 22
PIF_VALUE: 22
PIF_VALUE: 27
PIF_VALUE: 23
PIF_VALUE: 23
PIF_VALUE: 22
PIF_VALUE: 24
PIF_VALUE: 22
PIF_VALUE: 27
PIF_VALUE: 29
PIF_VALUE: 22
PIF_VALUE: 22
PIF_VALUE: 23
PIF_VALUE: 23
PIF_VALUE: 22
PIF_VALUE: 24
PIF_VALUE: 30
PIF_VALUE: 23
PIF_VALUE: 22
PIF_VALUE: 24
PIF_VALUE: 22
PIF_VALUE: 22
PIF_VALUE: 23
PIF_VALUE: 29
PIF_VALUE: 23
PIF_VALUE: 22
PIF_VALUE: 21
PIF_VALUE: 22
PIF_VALUE: 23
PIF_VALUE: 28

## 2025-01-14 ASSESSMENT — PAIN SCALES - GENERAL
PAINLEVEL_OUTOF10: 0

## 2025-01-14 NOTE — SIGNIFICANT EVENT
SIGNIFICANT EVENT:    [facility] called at 0214, for bed 4509.     At time of presentation patient was in cardiac arrest    Interventions preformed/Labs drawn included:   -0215 1 mg epi was given  -Ventricular fibrillation was seen on lifepack  -0217 Defibrillation delivered  -0218 ROSC with NSR. VSS  -The patient was then given 150 mg amiodarone bolus over 10 minutes and then started on amiodarone gtt.   EKG and stat labs were drawn. EKG showed no ST segment changes, QtC of 539 and the rhythm was sinus. Labs showed elevated lactate, potassium of 2.4 hemolyzed and normal magnesium. Contacted on-call cardiology who recommended continuing the amiodarone drip and correcting potassium.       Code [facility] called again at 0312.    At time of presentation patient was in cardiac arrest     Interventions preformed/Labs drawn included:   -Vfib  -0313 Defibrillation delivered  -0313 ROSC with NSR. VSS  -Beside POCT potassium was done showing 3.0. A Femoral line was placed to facilitate faster potassium repletion. Cardiology was contacted again. Recommended continuing potassium repletion and amiodarone gtt. Telemetry showed QtC between 530 to 560 ms.     -Addendum-    Fidencio Echavarria called again at 0423.    At time of presentation patient was in cardiac arrest     Interventions preformed/Labs drawn included:   -Vfib  -0424 Defibrillation delivered  -0424 ROSC with NSR. VSS  -Pt has had 3 defibrillation after this cardiac arrest with ROSC each time. Discussed with the patient that we would likely need to intubate him in the short term due to repeated defibrillation. Pt and family involved in the discussion and they all agreed to intubation.   -Intubated at 0503. Induced with Versed, propofol and etomidate.     Delroy Rose DO, PGY-1  01/14/25  5:28 AM

## 2025-01-14 NOTE — CARE COORDINATION
Case Management Assessment           Daily Note                 Date/ Time of Note: 1/14/2025 12:27 PM         Note completed by: Krystian Bowen RN    Patient Name: Jan Devine  YOB: 1955    Diagnosis:STEMI (ST elevation myocardial infarction) (HCC) [I21.3]  ST elevation myocardial infarction (STEMI), unspecified artery (HCC) [I21.3]  STEMI involving left anterior descending coronary artery (HCC) [I21.02]  Patient Admission Status: Inpatient  Date of Admission:1/12/2025  6:22 PM    Length of Stay: 2 GLOS:   Readmission Risk Score: Readmission Risk Score: 14.3    Current Plan of Care: Patient is now intubated and sedated.   ________________________________________________________________________________________  PT AM-PAC: 18 / 24 per last evaluation on: 1/13    OT AM-PAC: 17 / 24 per last evaluation on: 1/13    ________________________________________________________________________________________  Discharge Plan: To Be Determined DUE TO: medical course  Pre-cert required for SNF: NO  COVID Result:    Lab Results   Component Value Date/Time    COVID19 Detected 01/20/2022 09:56 PM    COVID19 DETECTED 01/20/2022 09:29 PM    COVID19 NOT DETECTED 08/14/2020 11:26 AM       Transportation PLAN for discharge:  tbd    Tentative discharge date: tbd    Potential assistance Purchasing Medications: Potential Assistance Purchasing Medications: No  Does Patient want to participate in local refill/ meds to beds program?:      Current barriers to discharge: Medical complications    Referrals completed:     Resources/ information provided:   ________________________________________________________________________________________  Case Management Notes: patient is now intubated and sedated; code blue called in early morning hours. Family at bedside currently. CM following, dispo planning on hold at this time due to change in patient condition.     Jan and his family were provided with choice of provider;

## 2025-01-14 NOTE — PROCEDURES
PROCEDURE NOTE  Date: 1/14/2025   Name: Jan Devine  YOB: 1955    CENTRAL LINE    Date/Time: 1/14/2025 3:51 AM    Performed by: Ni Blandon MD  Authorized by: Ni Blandon MD  Consent: The procedure was performed in an emergent situation.  Indications: vascular access  Anesthesia: local infiltration    Anesthesia:  Local Anesthetic: lidocaine 1% without epinephrine  Anesthetic total: 3 mL    Sedation:  Patient sedated: no    Preparation: skin prepped with ChloraPrep  Skin prep agent dried: skin prep agent completely dried prior to procedure  Sterile barriers: all five maximum sterile barriers used - cap, mask, sterile gown, sterile gloves, and large sterile sheet  Location details: right femoral  Site selection rationale: pericode nature  Patient position: flat  Catheter type: triple lumen  Catheter size: 7 Fr  Ultrasound guidance: yes  Sterile ultrasound techniques: sterile gel and sterile probe covers were used  Number of attempts: 1  Successful placement: yes  Post-procedure: line sutured and dressing applied  Assessment: blood return through all ports  Patient tolerance: patient tolerated the procedure well with no immediate complications  Comments: EBL 3 cc. Sterile R fem CVC

## 2025-01-15 ENCOUNTER — TELEPHONE (OUTPATIENT)
Dept: CARDIOLOGY CLINIC | Age: 70
End: 2025-01-15

## 2025-01-15 DIAGNOSIS — I25.83 CORONARY ARTERY DISEASE DUE TO LIPID RICH PLAQUE: ICD-10-CM

## 2025-01-15 DIAGNOSIS — I25.10 CAD S/P PERCUTANEOUS CORONARY ANGIOPLASTY: ICD-10-CM

## 2025-01-15 DIAGNOSIS — I47.29 POLYMORPHIC VENTRICULAR TACHYCARDIA (HCC): ICD-10-CM

## 2025-01-15 DIAGNOSIS — I50.23 ACUTE ON CHRONIC HEART FAILURE WITH REDUCED EJECTION FRACTION (HFREF, <= 40%) (HCC): Primary | ICD-10-CM

## 2025-01-15 DIAGNOSIS — I42.9 CARDIOMYOPATHY, UNSPECIFIED TYPE (HCC): ICD-10-CM

## 2025-01-15 DIAGNOSIS — I49.9 CARDIAC ARRHYTHMIA, UNSPECIFIED CARDIAC ARRHYTHMIA TYPE: ICD-10-CM

## 2025-01-15 DIAGNOSIS — Z98.61 CAD S/P PERCUTANEOUS CORONARY ANGIOPLASTY: ICD-10-CM

## 2025-01-15 DIAGNOSIS — I25.10 CORONARY ARTERY DISEASE DUE TO LIPID RICH PLAQUE: ICD-10-CM

## 2025-01-15 DIAGNOSIS — I21.3 ST ELEVATION MYOCARDIAL INFARCTION (STEMI), UNSPECIFIED ARTERY (HCC): ICD-10-CM

## 2025-01-15 DIAGNOSIS — I47.20 VENTRICULAR TACHYCARDIA (HCC): ICD-10-CM

## 2025-01-15 LAB
ALBUMIN SERPL-MCNC: 3.4 G/DL (ref 3.4–5)
ALBUMIN SERPL-MCNC: 3.6 G/DL (ref 3.4–5)
ALBUMIN SERPL-MCNC: 3.7 G/DL (ref 3.4–5)
ALBUMIN SERPL-MCNC: 3.7 G/DL (ref 3.4–5)
ANION GAP SERPL CALCULATED.3IONS-SCNC: 11 MMOL/L (ref 3–16)
ANION GAP SERPL CALCULATED.3IONS-SCNC: 11 MMOL/L (ref 3–16)
ANION GAP SERPL CALCULATED.3IONS-SCNC: 12 MMOL/L (ref 3–16)
ANION GAP SERPL CALCULATED.3IONS-SCNC: 12 MMOL/L (ref 3–16)
BASE EXCESS BLDA CALC-SCNC: 6 MMOL/L (ref -3–3)
BASOPHILS # BLD: 0.3 K/UL (ref 0–0.2)
BASOPHILS NFR BLD: 1.2 %
BUN SERPL-MCNC: 34 MG/DL (ref 7–20)
BUN SERPL-MCNC: 34 MG/DL (ref 7–20)
BUN SERPL-MCNC: 35 MG/DL (ref 7–20)
BUN SERPL-MCNC: 38 MG/DL (ref 7–20)
CALCIUM SERPL-MCNC: 8.4 MG/DL (ref 8.3–10.6)
CALCIUM SERPL-MCNC: 8.4 MG/DL (ref 8.3–10.6)
CALCIUM SERPL-MCNC: 8.5 MG/DL (ref 8.3–10.6)
CALCIUM SERPL-MCNC: 8.7 MG/DL (ref 8.3–10.6)
CHLORIDE SERPL-SCNC: 100 MMOL/L (ref 99–110)
CHLORIDE SERPL-SCNC: 102 MMOL/L (ref 99–110)
CHLORIDE SERPL-SCNC: 102 MMOL/L (ref 99–110)
CHLORIDE SERPL-SCNC: 103 MMOL/L (ref 99–110)
CO2 BLDA-SCNC: 33 MMOL/L
CO2 SERPL-SCNC: 25 MMOL/L (ref 21–32)
CO2 SERPL-SCNC: 26 MMOL/L (ref 21–32)
CO2 SERPL-SCNC: 27 MMOL/L (ref 21–32)
CO2 SERPL-SCNC: 27 MMOL/L (ref 21–32)
CREAT SERPL-MCNC: 1.1 MG/DL (ref 0.8–1.3)
CREAT SERPL-MCNC: 1.1 MG/DL (ref 0.8–1.3)
CREAT SERPL-MCNC: 1.2 MG/DL (ref 0.8–1.3)
CREAT SERPL-MCNC: 1.3 MG/DL (ref 0.8–1.3)
DEPRECATED RDW RBC AUTO: 14 % (ref 12.4–15.4)
EOSINOPHIL # BLD: 0.1 K/UL (ref 0–0.6)
EOSINOPHIL NFR BLD: 0.6 %
GFR SERPLBLD CREATININE-BSD FMLA CKD-EPI: 58 ML/MIN/{1.73_M2}
GFR SERPLBLD CREATININE-BSD FMLA CKD-EPI: 64 ML/MIN/{1.73_M2}
GFR SERPLBLD CREATININE-BSD FMLA CKD-EPI: 71 ML/MIN/{1.73_M2}
GFR SERPLBLD CREATININE-BSD FMLA CKD-EPI: 71 ML/MIN/{1.73_M2}
GLUCOSE BLD-MCNC: 171 MG/DL (ref 70–99)
GLUCOSE BLD-MCNC: 185 MG/DL (ref 70–99)
GLUCOSE BLD-MCNC: 223 MG/DL (ref 70–99)
GLUCOSE BLD-MCNC: 225 MG/DL (ref 70–99)
GLUCOSE SERPL-MCNC: 193 MG/DL (ref 70–99)
GLUCOSE SERPL-MCNC: 199 MG/DL (ref 70–99)
GLUCOSE SERPL-MCNC: 203 MG/DL (ref 70–99)
GLUCOSE SERPL-MCNC: 217 MG/DL (ref 70–99)
HCO3 BLDA-SCNC: 30.9 MMOL/L (ref 21–29)
HCT VFR BLD AUTO: 43.7 % (ref 40.5–52.5)
HGB BLD-MCNC: 14.7 G/DL (ref 13.5–17.5)
LYMPHOCYTES # BLD: 4.1 K/UL (ref 1–5.1)
LYMPHOCYTES NFR BLD: 19.1 %
MAGNESIUM SERPL-MCNC: 2.31 MG/DL (ref 1.8–2.4)
MAGNESIUM SERPL-MCNC: 2.34 MG/DL (ref 1.8–2.4)
MAGNESIUM SERPL-MCNC: 2.38 MG/DL (ref 1.8–2.4)
MAGNESIUM SERPL-MCNC: 2.43 MG/DL (ref 1.8–2.4)
MCH RBC QN AUTO: 30.7 PG (ref 26–34)
MCHC RBC AUTO-ENTMCNC: 33.5 G/DL (ref 31–36)
MCV RBC AUTO: 91.6 FL (ref 80–100)
MONOCYTES # BLD: 2.5 K/UL (ref 0–1.3)
MONOCYTES NFR BLD: 11.6 %
NEUTROPHILS # BLD: 14.7 K/UL (ref 1.7–7.7)
NEUTROPHILS NFR BLD: 67.5 %
PCO2 BLDA: 51.8 MM HG (ref 35–45)
PERFORMED ON: ABNORMAL
PH BLDA: 7.38 [PH] (ref 7.35–7.45)
PHOSPHATE SERPL-MCNC: 1.7 MG/DL (ref 2.5–4.9)
PHOSPHATE SERPL-MCNC: 2.1 MG/DL (ref 2.5–4.9)
PHOSPHATE SERPL-MCNC: 2.2 MG/DL (ref 2.5–4.9)
PHOSPHATE SERPL-MCNC: 3.2 MG/DL (ref 2.5–4.9)
PLATELET # BLD AUTO: 258 K/UL (ref 135–450)
PMV BLD AUTO: 8.1 FL (ref 5–10.5)
PO2 BLDA: 119.5 MM HG (ref 75–108)
POC SAMPLE TYPE: ABNORMAL
POTASSIUM SERPL-SCNC: 3.7 MMOL/L (ref 3.5–5.1)
POTASSIUM SERPL-SCNC: 4 MMOL/L (ref 3.5–5.1)
POTASSIUM SERPL-SCNC: 4.1 MMOL/L (ref 3.5–5.1)
POTASSIUM SERPL-SCNC: 4.5 MMOL/L (ref 3.5–5.1)
RBC # BLD AUTO: 4.77 M/UL (ref 4.2–5.9)
SAO2 % BLDA: 99 % (ref 93–100)
SODIUM SERPL-SCNC: 139 MMOL/L (ref 136–145)
SODIUM SERPL-SCNC: 139 MMOL/L (ref 136–145)
SODIUM SERPL-SCNC: 140 MMOL/L (ref 136–145)
SODIUM SERPL-SCNC: 140 MMOL/L (ref 136–145)
TRIGL SERPL-MCNC: 183 MG/DL (ref 0–150)
WBC # BLD AUTO: 21.8 K/UL (ref 4–11)

## 2025-01-15 PROCEDURE — 6360000002 HC RX W HCPCS

## 2025-01-15 PROCEDURE — 2500000003 HC RX 250 WO HCPCS: Performed by: INTERNAL MEDICINE

## 2025-01-15 PROCEDURE — 2500000003 HC RX 250 WO HCPCS

## 2025-01-15 PROCEDURE — 2580000003 HC RX 258

## 2025-01-15 PROCEDURE — 36415 COLL VENOUS BLD VENIPUNCTURE: CPT

## 2025-01-15 PROCEDURE — 99223 1ST HOSP IP/OBS HIGH 75: CPT | Performed by: INTERNAL MEDICINE

## 2025-01-15 PROCEDURE — 82803 BLOOD GASES ANY COMBINATION: CPT

## 2025-01-15 PROCEDURE — 2700000000 HC OXYGEN THERAPY PER DAY

## 2025-01-15 PROCEDURE — 94003 VENT MGMT INPAT SUBQ DAY: CPT

## 2025-01-15 PROCEDURE — 6370000000 HC RX 637 (ALT 250 FOR IP)

## 2025-01-15 PROCEDURE — 2000000000 HC ICU R&B

## 2025-01-15 PROCEDURE — 99291 CRITICAL CARE FIRST HOUR: CPT | Performed by: INTERNAL MEDICINE

## 2025-01-15 PROCEDURE — 84478 ASSAY OF TRIGLYCERIDES: CPT

## 2025-01-15 PROCEDURE — 6370000000 HC RX 637 (ALT 250 FOR IP): Performed by: INTERNAL MEDICINE

## 2025-01-15 PROCEDURE — 80069 RENAL FUNCTION PANEL: CPT

## 2025-01-15 PROCEDURE — 83735 ASSAY OF MAGNESIUM: CPT

## 2025-01-15 PROCEDURE — 85025 COMPLETE CBC W/AUTO DIFF WBC: CPT

## 2025-01-15 PROCEDURE — 94761 N-INVAS EAR/PLS OXIMETRY MLT: CPT

## 2025-01-15 RX ORDER — METOPROLOL TARTRATE 25 MG/1
12.5 TABLET, FILM COATED ORAL 2 TIMES DAILY
Status: DISCONTINUED | OUTPATIENT
Start: 2025-01-15 | End: 2025-01-17

## 2025-01-15 RX ORDER — POTASSIUM CHLORIDE 29.8 MG/ML
20 INJECTION INTRAVENOUS ONCE
Status: COMPLETED | OUTPATIENT
Start: 2025-01-15 | End: 2025-01-15

## 2025-01-15 RX ORDER — DEXMEDETOMIDINE HYDROCHLORIDE 4 UG/ML
.1-1.5 INJECTION, SOLUTION INTRAVENOUS CONTINUOUS
Status: DISCONTINUED | OUTPATIENT
Start: 2025-01-15 | End: 2025-01-18

## 2025-01-15 RX ORDER — ENOXAPARIN SODIUM 100 MG/ML
30 INJECTION SUBCUTANEOUS 2 TIMES DAILY
Status: DISCONTINUED | OUTPATIENT
Start: 2025-01-15 | End: 2025-01-21 | Stop reason: HOSPADM

## 2025-01-15 RX ADMIN — AMIODARONE HYDROCHLORIDE 0.5 MG/MIN: 1.8 INJECTION, SOLUTION INTRAVENOUS at 15:46

## 2025-01-15 RX ADMIN — ACETAMINOPHEN 650 MG: 325 TABLET ORAL at 16:18

## 2025-01-15 RX ADMIN — TICAGRELOR 90 MG: 90 TABLET ORAL at 09:27

## 2025-01-15 RX ADMIN — POTASSIUM CHLORIDE 20 MEQ: 400 INJECTION, SOLUTION INTRAVENOUS at 01:03

## 2025-01-15 RX ADMIN — POTASSIUM CHLORIDE 20 MEQ: 400 INJECTION, SOLUTION INTRAVENOUS at 00:00

## 2025-01-15 RX ADMIN — PROPOFOL 20 MCG/KG/MIN: 10 INJECTION, EMULSION INTRAVENOUS at 09:26

## 2025-01-15 RX ADMIN — ENOXAPARIN SODIUM 30 MG: 100 INJECTION SUBCUTANEOUS at 22:05

## 2025-01-15 RX ADMIN — POTASSIUM PHOSPHATE, MONOBASIC AND POTASSIUM PHOSPHATE, DIBASIC 15 MMOL: 224; 236 INJECTION, SOLUTION, CONCENTRATE INTRAVENOUS at 19:27

## 2025-01-15 RX ADMIN — POTASSIUM CHLORIDE 20 MEQ: 400 INJECTION, SOLUTION INTRAVENOUS at 18:10

## 2025-01-15 RX ADMIN — INSULIN LISPRO 2 UNITS: 100 INJECTION, SOLUTION INTRAVENOUS; SUBCUTANEOUS at 22:04

## 2025-01-15 RX ADMIN — FAMOTIDINE 20 MG: 10 INJECTION, SOLUTION INTRAVENOUS at 09:27

## 2025-01-15 RX ADMIN — FAMOTIDINE 20 MG: 10 INJECTION, SOLUTION INTRAVENOUS at 22:05

## 2025-01-15 RX ADMIN — PROPOFOL 25 MCG/KG/MIN: 10 INJECTION, EMULSION INTRAVENOUS at 03:30

## 2025-01-15 RX ADMIN — DEXMEDETOMIDINE HYDROCHLORIDE 0.2 MCG/KG/HR: 4 INJECTION, SOLUTION INTRAVENOUS at 18:15

## 2025-01-15 RX ADMIN — INSULIN LISPRO 2 UNITS: 100 INJECTION, SOLUTION INTRAVENOUS; SUBCUTANEOUS at 10:22

## 2025-01-15 RX ADMIN — ASPIRIN 81 MG: 81 TABLET, CHEWABLE ORAL at 09:27

## 2025-01-15 RX ADMIN — PROPOFOL 15 MCG/KG/MIN: 10 INJECTION, EMULSION INTRAVENOUS at 14:59

## 2025-01-15 RX ADMIN — ENOXAPARIN SODIUM 30 MG: 100 INJECTION SUBCUTANEOUS at 13:06

## 2025-01-15 RX ADMIN — SODIUM CHLORIDE, PRESERVATIVE FREE 10 ML: 5 INJECTION INTRAVENOUS at 22:04

## 2025-01-15 RX ADMIN — NOREPINEPHRINE BITARTRATE 13 MCG/MIN: 64 SOLUTION INTRAVENOUS at 02:50

## 2025-01-15 RX ADMIN — ROSUVASTATIN CALCIUM 40 MG: 20 TABLET, FILM COATED ORAL at 22:04

## 2025-01-15 RX ADMIN — POLYETHYLENE GLYCOL 3350 17 G: 17 POWDER, FOR SOLUTION ORAL at 09:26

## 2025-01-15 RX ADMIN — TICAGRELOR 90 MG: 90 TABLET ORAL at 22:04

## 2025-01-15 RX ADMIN — AMIODARONE HYDROCHLORIDE 0.5 MG/MIN: 1.8 INJECTION, SOLUTION INTRAVENOUS at 06:39

## 2025-01-15 RX ADMIN — METOPROLOL TARTRATE 25 MG: 25 TABLET, FILM COATED ORAL at 09:27

## 2025-01-15 RX ADMIN — INSULIN LISPRO 2 UNITS: 100 INJECTION, SOLUTION INTRAVENOUS; SUBCUTANEOUS at 04:31

## 2025-01-15 RX ADMIN — Medication 75 MCG/HR: at 10:50

## 2025-01-15 RX ADMIN — DIBASIC SODIUM PHOSPHATE, MONOBASIC POTASSIUM PHOSPHATE AND MONOBASIC SODIUM PHOSPHATE 2 TABLET: 852; 155; 130 TABLET ORAL at 09:26

## 2025-01-15 RX ADMIN — DIBASIC SODIUM PHOSPHATE, MONOBASIC POTASSIUM PHOSPHATE AND MONOBASIC SODIUM PHOSPHATE 2 TABLET: 852; 155; 130 TABLET ORAL at 22:04

## 2025-01-15 ASSESSMENT — PULMONARY FUNCTION TESTS
PIF_VALUE: 28
PIF_VALUE: 27
PIF_VALUE: 26
PIF_VALUE: 29
PIF_VALUE: 29
PIF_VALUE: 27
PIF_VALUE: 13
PIF_VALUE: 28
PIF_VALUE: 27
PIF_VALUE: 28
PIF_VALUE: 13
PIF_VALUE: 30
PIF_VALUE: 29
PIF_VALUE: 13
PIF_VALUE: 29
PIF_VALUE: 30
PIF_VALUE: 30
PIF_VALUE: 26
PIF_VALUE: 13
PIF_VALUE: 29
PIF_VALUE: 28
PIF_VALUE: 13
PIF_VALUE: 13
PIF_VALUE: 30
PIF_VALUE: 27
PIF_VALUE: 28
PIF_VALUE: 14
PIF_VALUE: 27
PIF_VALUE: 28
PIF_VALUE: 27
PIF_VALUE: 13
PIF_VALUE: 29
PIF_VALUE: 25
PIF_VALUE: 13
PIF_VALUE: 31
PIF_VALUE: 28
PIF_VALUE: 28
PIF_VALUE: 30
PIF_VALUE: 29
PIF_VALUE: 27
PIF_VALUE: 13
PIF_VALUE: 28
PIF_VALUE: 14
PIF_VALUE: 28
PIF_VALUE: 28
PIF_VALUE: 13
PIF_VALUE: 13
PIF_VALUE: 14
PIF_VALUE: 28
PIF_VALUE: 33
PIF_VALUE: 29
PIF_VALUE: 13
PIF_VALUE: 30
PIF_VALUE: 31
PIF_VALUE: 30
PIF_VALUE: 30
PIF_VALUE: 26
PIF_VALUE: 28
PIF_VALUE: 30
PIF_VALUE: 28
PIF_VALUE: 30
PIF_VALUE: 29
PIF_VALUE: 13
PIF_VALUE: 28
PIF_VALUE: 29
PIF_VALUE: 29
PIF_VALUE: 24
PIF_VALUE: 28
PIF_VALUE: 29
PIF_VALUE: 29
PIF_VALUE: 27
PIF_VALUE: 13
PIF_VALUE: 13
PIF_VALUE: 29
PIF_VALUE: 30
PIF_VALUE: 27
PIF_VALUE: 13
PIF_VALUE: 28
PIF_VALUE: 29
PIF_VALUE: 28
PIF_VALUE: 14
PIF_VALUE: 14
PIF_VALUE: 13
PIF_VALUE: 27
PIF_VALUE: 27
PIF_VALUE: 28
PIF_VALUE: 14
PIF_VALUE: 13
PIF_VALUE: 28
PIF_VALUE: 27
PIF_VALUE: 29
PIF_VALUE: 28
PIF_VALUE: 28
PIF_VALUE: 27
PIF_VALUE: 30

## 2025-01-15 ASSESSMENT — PAIN SCALES - GENERAL
PAINLEVEL_OUTOF10: 0

## 2025-01-15 NOTE — TELEPHONE ENCOUNTER
Barbara from [facility] called stating she received an order or life vest, however she needs additional info in order to get the vest approved. Please call back at [phone] Ext [phone]

## 2025-01-16 ENCOUNTER — APPOINTMENT (OUTPATIENT)
Dept: GENERAL RADIOLOGY | Age: 70
DRG: 321 | End: 2025-01-16
Payer: MEDICARE

## 2025-01-16 LAB
ALBUMIN SERPL-MCNC: 3.1 G/DL (ref 3.4–5)
ALBUMIN SERPL-MCNC: 3.3 G/DL (ref 3.4–5)
ALBUMIN SERPL-MCNC: 3.4 G/DL (ref 3.4–5)
ALBUMIN SERPL-MCNC: 3.4 G/DL (ref 3.4–5)
ANION GAP SERPL CALCULATED.3IONS-SCNC: 10 MMOL/L (ref 3–16)
ANION GAP SERPL CALCULATED.3IONS-SCNC: 12 MMOL/L (ref 3–16)
BACTERIA SPEC RESP CULT: NORMAL
BASE EXCESS BLDA CALC-SCNC: 3 MMOL/L (ref -3–3)
BASOPHILS # BLD: 0 K/UL (ref 0–0.2)
BASOPHILS NFR BLD: 0 %
BUN SERPL-MCNC: 36 MG/DL (ref 7–20)
BUN SERPL-MCNC: 37 MG/DL (ref 7–20)
BUN SERPL-MCNC: 38 MG/DL (ref 7–20)
BUN SERPL-MCNC: 42 MG/DL (ref 7–20)
CALCIUM SERPL-MCNC: 8.3 MG/DL (ref 8.3–10.6)
CALCIUM SERPL-MCNC: 8.5 MG/DL (ref 8.3–10.6)
CHLORIDE SERPL-SCNC: 103 MMOL/L (ref 99–110)
CHLORIDE SERPL-SCNC: 105 MMOL/L (ref 99–110)
CHLORIDE SERPL-SCNC: 105 MMOL/L (ref 99–110)
CHLORIDE SERPL-SCNC: 106 MMOL/L (ref 99–110)
CO2 BLDA-SCNC: 29 MMOL/L
CO2 SERPL-SCNC: 22 MMOL/L (ref 21–32)
CO2 SERPL-SCNC: 24 MMOL/L (ref 21–32)
CO2 SERPL-SCNC: 25 MMOL/L (ref 21–32)
CO2 SERPL-SCNC: 27 MMOL/L (ref 21–32)
CREAT SERPL-MCNC: 1.1 MG/DL (ref 0.8–1.3)
CREAT SERPL-MCNC: 1.2 MG/DL (ref 0.8–1.3)
DEPRECATED RDW RBC AUTO: 14 % (ref 12.4–15.4)
EOSINOPHIL # BLD: 0.4 K/UL (ref 0–0.6)
EOSINOPHIL NFR BLD: 2 %
GFR SERPLBLD CREATININE-BSD FMLA CKD-EPI: 64 ML/MIN/{1.73_M2}
GFR SERPLBLD CREATININE-BSD FMLA CKD-EPI: 71 ML/MIN/{1.73_M2}
GLUCOSE BLD-MCNC: 167 MG/DL (ref 70–99)
GLUCOSE BLD-MCNC: 168 MG/DL (ref 70–99)
GLUCOSE BLD-MCNC: 171 MG/DL (ref 70–99)
GLUCOSE BLD-MCNC: 179 MG/DL (ref 70–99)
GLUCOSE SERPL-MCNC: 177 MG/DL (ref 70–99)
GLUCOSE SERPL-MCNC: 179 MG/DL (ref 70–99)
GLUCOSE SERPL-MCNC: 196 MG/DL (ref 70–99)
GLUCOSE SERPL-MCNC: 207 MG/DL (ref 70–99)
GRAM STN SPEC: NORMAL
HCO3 BLDA-SCNC: 27.5 MMOL/L (ref 21–29)
HCT VFR BLD AUTO: 44.5 % (ref 40.5–52.5)
HGB BLD-MCNC: 14.9 G/DL (ref 13.5–17.5)
LYMPHOCYTES # BLD: 2.3 K/UL (ref 1–5.1)
LYMPHOCYTES NFR BLD: 13 %
MAGNESIUM SERPL-MCNC: 2.35 MG/DL (ref 1.8–2.4)
MAGNESIUM SERPL-MCNC: 2.4 MG/DL (ref 1.8–2.4)
MAGNESIUM SERPL-MCNC: 2.41 MG/DL (ref 1.8–2.4)
MAGNESIUM SERPL-MCNC: 2.44 MG/DL (ref 1.8–2.4)
MCH RBC QN AUTO: 30.9 PG (ref 26–34)
MCHC RBC AUTO-ENTMCNC: 33.4 G/DL (ref 31–36)
MCV RBC AUTO: 92.5 FL (ref 80–100)
MONOCYTES # BLD: 1.8 K/UL (ref 0–1.3)
MONOCYTES NFR BLD: 10 %
NEUTROPHILS # BLD: 13.1 K/UL (ref 1.7–7.7)
NEUTROPHILS NFR BLD: 75 %
PCO2 BLDA: 42.3 MM HG (ref 35–45)
PERFORMED ON: ABNORMAL
PERFORMED ON: NORMAL
PH BLDA: 7.42 [PH] (ref 7.35–7.45)
PHOSPHATE SERPL-MCNC: 2.4 MG/DL (ref 2.5–4.9)
PHOSPHATE SERPL-MCNC: 2.5 MG/DL (ref 2.5–4.9)
PHOSPHATE SERPL-MCNC: 2.6 MG/DL (ref 2.5–4.9)
PHOSPHATE SERPL-MCNC: 3.1 MG/DL (ref 2.5–4.9)
PLATELET # BLD AUTO: 212 K/UL (ref 135–450)
PMV BLD AUTO: 8.7 FL (ref 5–10.5)
PO2 BLDA: 87.3 MM HG (ref 75–108)
POC SAMPLE TYPE: NORMAL
POTASSIUM SERPL-SCNC: 3.8 MMOL/L (ref 3.5–5.1)
POTASSIUM SERPL-SCNC: 4 MMOL/L (ref 3.5–5.1)
POTASSIUM SERPL-SCNC: 4.2 MMOL/L (ref 3.5–5.1)
POTASSIUM SERPL-SCNC: 4.4 MMOL/L (ref 3.5–5.1)
POTASSIUM SERPL-SCNC: ABNORMAL MMOL/L (ref 3.5–5.1)
RBC # BLD AUTO: 4.8 M/UL (ref 4.2–5.9)
RBC MORPH BLD: NORMAL
REASON FOR REJECTION: NORMAL
REJECTED TEST: NORMAL
SAO2 % BLDA: 97 % (ref 93–100)
SODIUM SERPL-SCNC: 137 MMOL/L (ref 136–145)
SODIUM SERPL-SCNC: 142 MMOL/L (ref 136–145)
WBC # BLD AUTO: 17.5 K/UL (ref 4–11)

## 2025-01-16 PROCEDURE — 6360000002 HC RX W HCPCS

## 2025-01-16 PROCEDURE — 6370000000 HC RX 637 (ALT 250 FOR IP)

## 2025-01-16 PROCEDURE — 83735 ASSAY OF MAGNESIUM: CPT

## 2025-01-16 PROCEDURE — 82803 BLOOD GASES ANY COMBINATION: CPT

## 2025-01-16 PROCEDURE — 2500000003 HC RX 250 WO HCPCS: Performed by: INTERNAL MEDICINE

## 2025-01-16 PROCEDURE — 2580000003 HC RX 258

## 2025-01-16 PROCEDURE — 99291 CRITICAL CARE FIRST HOUR: CPT | Performed by: INTERNAL MEDICINE

## 2025-01-16 PROCEDURE — 80069 RENAL FUNCTION PANEL: CPT

## 2025-01-16 PROCEDURE — 85025 COMPLETE CBC W/AUTO DIFF WBC: CPT

## 2025-01-16 PROCEDURE — 71045 X-RAY EXAM CHEST 1 VIEW: CPT

## 2025-01-16 PROCEDURE — 94660 CPAP INITIATION&MGMT: CPT

## 2025-01-16 PROCEDURE — 36415 COLL VENOUS BLD VENIPUNCTURE: CPT

## 2025-01-16 PROCEDURE — 94761 N-INVAS EAR/PLS OXIMETRY MLT: CPT

## 2025-01-16 PROCEDURE — 94003 VENT MGMT INPAT SUBQ DAY: CPT

## 2025-01-16 PROCEDURE — 84132 ASSAY OF SERUM POTASSIUM: CPT

## 2025-01-16 PROCEDURE — 2500000003 HC RX 250 WO HCPCS

## 2025-01-16 PROCEDURE — 6370000000 HC RX 637 (ALT 250 FOR IP): Performed by: INTERNAL MEDICINE

## 2025-01-16 PROCEDURE — 2700000000 HC OXYGEN THERAPY PER DAY

## 2025-01-16 PROCEDURE — 2000000000 HC ICU R&B

## 2025-01-16 RX ORDER — POTASSIUM CHLORIDE 29.8 MG/ML
20 INJECTION INTRAVENOUS ONCE
Status: COMPLETED | OUTPATIENT
Start: 2025-01-16 | End: 2025-01-16

## 2025-01-16 RX ORDER — POLYETHYLENE GLYCOL 3350 17 G/17G
17 POWDER, FOR SOLUTION ORAL 2 TIMES DAILY
Status: DISCONTINUED | OUTPATIENT
Start: 2025-01-16 | End: 2025-01-17

## 2025-01-16 RX ORDER — POLYETHYLENE GLYCOL 3350 17 G/17G
34 POWDER, FOR SOLUTION ORAL DAILY
Status: DISCONTINUED | OUTPATIENT
Start: 2025-01-16 | End: 2025-01-16

## 2025-01-16 RX ORDER — SENNOSIDES A AND B 8.6 MG/1
1 TABLET, FILM COATED ORAL NIGHTLY
Status: DISCONTINUED | OUTPATIENT
Start: 2025-01-16 | End: 2025-01-17

## 2025-01-16 RX ADMIN — SODIUM CHLORIDE, PRESERVATIVE FREE 10 ML: 5 INJECTION INTRAVENOUS at 21:24

## 2025-01-16 RX ADMIN — NOREPINEPHRINE BITARTRATE 5 MCG/MIN: 64 SOLUTION INTRAVENOUS at 06:33

## 2025-01-16 RX ADMIN — DIBASIC SODIUM PHOSPHATE, MONOBASIC POTASSIUM PHOSPHATE AND MONOBASIC SODIUM PHOSPHATE 2 TABLET: 852; 155; 130 TABLET ORAL at 21:10

## 2025-01-16 RX ADMIN — ENOXAPARIN SODIUM 30 MG: 100 INJECTION SUBCUTANEOUS at 21:09

## 2025-01-16 RX ADMIN — POLYETHYLENE GLYCOL 3350 17 G: 17 POWDER, FOR SOLUTION ORAL at 21:10

## 2025-01-16 RX ADMIN — DEXMEDETOMIDINE HYDROCHLORIDE 0.4 MCG/KG/HR: 4 INJECTION, SOLUTION INTRAVENOUS at 01:25

## 2025-01-16 RX ADMIN — DEXMEDETOMIDINE HYDROCHLORIDE 0.4 MCG/KG/HR: 4 INJECTION, SOLUTION INTRAVENOUS at 09:03

## 2025-01-16 RX ADMIN — TICAGRELOR 90 MG: 90 TABLET ORAL at 21:11

## 2025-01-16 RX ADMIN — POTASSIUM CHLORIDE 20 MEQ: 400 INJECTION, SOLUTION INTRAVENOUS at 17:29

## 2025-01-16 RX ADMIN — METOPROLOL TARTRATE 12.5 MG: 25 TABLET, FILM COATED ORAL at 08:45

## 2025-01-16 RX ADMIN — ACETAMINOPHEN 650 MG: 325 TABLET ORAL at 13:27

## 2025-01-16 RX ADMIN — METOPROLOL TARTRATE 12.5 MG: 25 TABLET, FILM COATED ORAL at 21:23

## 2025-01-16 RX ADMIN — AMIODARONE HYDROCHLORIDE 0.5 MG/MIN: 1.8 INJECTION, SOLUTION INTRAVENOUS at 12:33

## 2025-01-16 RX ADMIN — FAMOTIDINE 20 MG: 10 INJECTION, SOLUTION INTRAVENOUS at 08:46

## 2025-01-16 RX ADMIN — DIBASIC SODIUM PHOSPHATE, MONOBASIC POTASSIUM PHOSPHATE AND MONOBASIC SODIUM PHOSPHATE 2 TABLET: 852; 155; 130 TABLET ORAL at 08:45

## 2025-01-16 RX ADMIN — POLYETHYLENE GLYCOL 3350 17 G: 17 POWDER, FOR SOLUTION ORAL at 08:46

## 2025-01-16 RX ADMIN — SENNOSIDES 8.6 MG: 8.6 TABLET, FILM COATED ORAL at 21:10

## 2025-01-16 RX ADMIN — ROSUVASTATIN CALCIUM 40 MG: 20 TABLET, FILM COATED ORAL at 21:11

## 2025-01-16 RX ADMIN — FAMOTIDINE 20 MG: 10 INJECTION, SOLUTION INTRAVENOUS at 21:09

## 2025-01-16 RX ADMIN — ACETAMINOPHEN 650 MG: 325 TABLET ORAL at 08:55

## 2025-01-16 RX ADMIN — PROPOFOL 15 MCG/KG/MIN: 10 INJECTION, EMULSION INTRAVENOUS at 02:02

## 2025-01-16 RX ADMIN — ASPIRIN 81 MG: 81 TABLET, CHEWABLE ORAL at 08:45

## 2025-01-16 RX ADMIN — TICAGRELOR 90 MG: 90 TABLET ORAL at 08:46

## 2025-01-16 RX ADMIN — ENOXAPARIN SODIUM 30 MG: 100 INJECTION SUBCUTANEOUS at 08:46

## 2025-01-16 RX ADMIN — AMIODARONE HYDROCHLORIDE 0.5 MG/MIN: 1.8 INJECTION, SOLUTION INTRAVENOUS at 01:37

## 2025-01-16 ASSESSMENT — PULMONARY FUNCTION TESTS
PIF_VALUE: 37
PIF_VALUE: 41
PIF_VALUE: 38
PIF_VALUE: 41
PIF_VALUE: 30
PIF_VALUE: 37
PIF_VALUE: 29
PIF_VALUE: 27
PIF_VALUE: 30
PIF_VALUE: 43
PIF_VALUE: 13
PIF_VALUE: 13
PIF_VALUE: 44
PIF_VALUE: 36
PIF_VALUE: 13
PIF_VALUE: 14
PIF_VALUE: 13
PIF_VALUE: 30
PIF_VALUE: 36
PIF_VALUE: 36
PIF_VALUE: 43
PIF_VALUE: 33
PIF_VALUE: 43
PIF_VALUE: 41
PIF_VALUE: 39
PIF_VALUE: 39
PIF_VALUE: 28
PIF_VALUE: 43
PIF_VALUE: 26
PIF_VALUE: 29
PIF_VALUE: 38
PIF_VALUE: 35
PIF_VALUE: 36
PIF_VALUE: 13
PIF_VALUE: 13
PIF_VALUE: 35
PIF_VALUE: 48
PIF_VALUE: 42
PIF_VALUE: 13
PIF_VALUE: 30
PIF_VALUE: 31
PIF_VALUE: 35
PIF_VALUE: 37
PIF_VALUE: 43
PIF_VALUE: 42
PIF_VALUE: 37
PIF_VALUE: 30
PIF_VALUE: 38
PIF_VALUE: 37

## 2025-01-16 ASSESSMENT — PAIN SCALES - GENERAL
PAINLEVEL_OUTOF10: 0

## 2025-01-17 ENCOUNTER — APPOINTMENT (OUTPATIENT)
Dept: GENERAL RADIOLOGY | Age: 70
DRG: 321 | End: 2025-01-17
Payer: MEDICARE

## 2025-01-17 DIAGNOSIS — Z95.5 S/P CORONARY ARTERY STENT PLACEMENT: Primary | ICD-10-CM

## 2025-01-17 LAB
ALBUMIN SERPL-MCNC: 3.3 G/DL (ref 3.4–5)
ALBUMIN SERPL-MCNC: 3.4 G/DL (ref 3.4–5)
ALBUMIN SERPL-MCNC: 3.5 G/DL (ref 3.4–5)
ALBUMIN SERPL-MCNC: 3.6 G/DL (ref 3.4–5)
ALP SERPL-CCNC: 65 U/L (ref 40–129)
ALT SERPL-CCNC: 60 U/L (ref 10–40)
ANION GAP SERPL CALCULATED.3IONS-SCNC: 10 MMOL/L (ref 3–16)
ANION GAP SERPL CALCULATED.3IONS-SCNC: 12 MMOL/L (ref 3–16)
ANION GAP SERPL CALCULATED.3IONS-SCNC: 8 MMOL/L (ref 3–16)
ANION GAP SERPL CALCULATED.3IONS-SCNC: 9 MMOL/L (ref 3–16)
AST SERPL-CCNC: 81 U/L (ref 15–37)
BASOPHILS # BLD: 0.1 K/UL (ref 0–0.2)
BASOPHILS NFR BLD: 0.5 %
BILIRUB DIRECT SERPL-MCNC: 0.4 MG/DL (ref 0–0.3)
BILIRUB INDIRECT SERPL-MCNC: 0.6 MG/DL (ref 0–1)
BILIRUB SERPL-MCNC: 1 MG/DL (ref 0–1)
BILIRUB UR QL STRIP.AUTO: NEGATIVE
BUN SERPL-MCNC: 31 MG/DL (ref 7–20)
BUN SERPL-MCNC: 32 MG/DL (ref 7–20)
BUN SERPL-MCNC: 35 MG/DL (ref 7–20)
BUN SERPL-MCNC: 37 MG/DL (ref 7–20)
BUN SERPL-MCNC: 40 MG/DL (ref 7–20)
BUN SERPL-MCNC: 41 MG/DL (ref 7–20)
CALCIUM SERPL-MCNC: 8.2 MG/DL (ref 8.3–10.6)
CALCIUM SERPL-MCNC: 8.3 MG/DL (ref 8.3–10.6)
CALCIUM SERPL-MCNC: 8.4 MG/DL (ref 8.3–10.6)
CALCIUM SERPL-MCNC: 8.5 MG/DL (ref 8.3–10.6)
CHLORIDE SERPL-SCNC: 101 MMOL/L (ref 99–110)
CHLORIDE SERPL-SCNC: 102 MMOL/L (ref 99–110)
CHLORIDE SERPL-SCNC: 103 MMOL/L (ref 99–110)
CHLORIDE SERPL-SCNC: 105 MMOL/L (ref 99–110)
CLARITY UR: CLEAR
CO2 SERPL-SCNC: 27 MMOL/L (ref 21–32)
CO2 SERPL-SCNC: 28 MMOL/L (ref 21–32)
CO2 SERPL-SCNC: 28 MMOL/L (ref 21–32)
CO2 SERPL-SCNC: 30 MMOL/L (ref 21–32)
CO2 SERPL-SCNC: 31 MMOL/L (ref 21–32)
CO2 SERPL-SCNC: 31 MMOL/L (ref 21–32)
COLOR UR: YELLOW
CREAT SERPL-MCNC: 1 MG/DL (ref 0.8–1.3)
CREAT SERPL-MCNC: 1.1 MG/DL (ref 0.8–1.3)
DEPRECATED RDW RBC AUTO: 13.7 % (ref 12.4–15.4)
EOSINOPHIL # BLD: 0.4 K/UL (ref 0–0.6)
EOSINOPHIL NFR BLD: 2.9 %
GFR SERPLBLD CREATININE-BSD FMLA CKD-EPI: 71 ML/MIN/{1.73_M2}
GFR SERPLBLD CREATININE-BSD FMLA CKD-EPI: 80 ML/MIN/{1.73_M2}
GLUCOSE BLD-MCNC: 130 MG/DL (ref 70–99)
GLUCOSE BLD-MCNC: 158 MG/DL (ref 70–99)
GLUCOSE BLD-MCNC: 186 MG/DL (ref 70–99)
GLUCOSE BLD-MCNC: 191 MG/DL (ref 70–99)
GLUCOSE SERPL-MCNC: 156 MG/DL (ref 70–99)
GLUCOSE SERPL-MCNC: 159 MG/DL (ref 70–99)
GLUCOSE SERPL-MCNC: 162 MG/DL (ref 70–99)
GLUCOSE SERPL-MCNC: 170 MG/DL (ref 70–99)
GLUCOSE SERPL-MCNC: 183 MG/DL (ref 70–99)
GLUCOSE SERPL-MCNC: 187 MG/DL (ref 70–99)
GLUCOSE UR STRIP.AUTO-MCNC: NEGATIVE MG/DL
HCT VFR BLD AUTO: 41.4 % (ref 40.5–52.5)
HGB BLD-MCNC: 13.9 G/DL (ref 13.5–17.5)
HGB UR QL STRIP.AUTO: ABNORMAL
KETONES UR STRIP.AUTO-MCNC: 15 MG/DL
LEUKOCYTE ESTERASE UR QL STRIP.AUTO: NEGATIVE
LYMPHOCYTES # BLD: 2 K/UL (ref 1–5.1)
LYMPHOCYTES NFR BLD: 15.1 %
MAGNESIUM SERPL-MCNC: 2.25 MG/DL (ref 1.8–2.4)
MAGNESIUM SERPL-MCNC: 2.31 MG/DL (ref 1.8–2.4)
MAGNESIUM SERPL-MCNC: 2.33 MG/DL (ref 1.8–2.4)
MAGNESIUM SERPL-MCNC: 2.35 MG/DL (ref 1.8–2.4)
MAGNESIUM SERPL-MCNC: 2.36 MG/DL (ref 1.8–2.4)
MAGNESIUM SERPL-MCNC: 2.41 MG/DL (ref 1.8–2.4)
MCH RBC QN AUTO: 30.6 PG (ref 26–34)
MCHC RBC AUTO-ENTMCNC: 33.5 G/DL (ref 31–36)
MCV RBC AUTO: 91.5 FL (ref 80–100)
MONOCYTES # BLD: 1.5 K/UL (ref 0–1.3)
MONOCYTES NFR BLD: 11.2 %
NEUTROPHILS # BLD: 9.2 K/UL (ref 1.7–7.7)
NEUTROPHILS NFR BLD: 70.3 %
NITRITE UR QL STRIP.AUTO: NEGATIVE
NT-PROBNP SERPL-MCNC: 3547 PG/ML (ref 0–124)
PERFORMED ON: ABNORMAL
PH UR STRIP.AUTO: 7.5 [PH] (ref 5–8)
PHOSPHATE SERPL-MCNC: 1.9 MG/DL (ref 2.5–4.9)
PHOSPHATE SERPL-MCNC: 2 MG/DL (ref 2.5–4.9)
PHOSPHATE SERPL-MCNC: 2.6 MG/DL (ref 2.5–4.9)
PHOSPHATE SERPL-MCNC: 2.7 MG/DL (ref 2.5–4.9)
PLATELET # BLD AUTO: 196 K/UL (ref 135–450)
PMV BLD AUTO: 8.5 FL (ref 5–10.5)
POTASSIUM SERPL-SCNC: 3.4 MMOL/L (ref 3.5–5.1)
POTASSIUM SERPL-SCNC: 3.7 MMOL/L (ref 3.5–5.1)
POTASSIUM SERPL-SCNC: 3.7 MMOL/L (ref 3.5–5.1)
POTASSIUM SERPL-SCNC: 3.8 MMOL/L (ref 3.5–5.1)
POTASSIUM SERPL-SCNC: 4.2 MMOL/L (ref 3.5–5.1)
POTASSIUM SERPL-SCNC: ABNORMAL MMOL/L (ref 3.5–5.1)
PROT SERPL-MCNC: 6.7 G/DL (ref 6.4–8.2)
PROT UR STRIP.AUTO-MCNC: 100 MG/DL
RBC # BLD AUTO: 4.53 M/UL (ref 4.2–5.9)
RBC #/AREA URNS HPF: NORMAL /HPF (ref 0–4)
REASON FOR REJECTION: NORMAL
REJECTED TEST: NORMAL
SODIUM SERPL-SCNC: 139 MMOL/L (ref 136–145)
SODIUM SERPL-SCNC: 140 MMOL/L (ref 136–145)
SODIUM SERPL-SCNC: 142 MMOL/L (ref 136–145)
SODIUM SERPL-SCNC: 142 MMOL/L (ref 136–145)
SODIUM SERPL-SCNC: 143 MMOL/L (ref 136–145)
SODIUM SERPL-SCNC: 143 MMOL/L (ref 136–145)
SP GR UR STRIP.AUTO: 1.01 (ref 1–1.03)
UA COMPLETE W REFLEX CULTURE PNL UR: ABNORMAL
UA DIPSTICK W REFLEX MICRO PNL UR: YES
URN SPEC COLLECT METH UR: ABNORMAL
UROBILINOGEN UR STRIP-ACNC: 2 E.U./DL
WBC # BLD AUTO: 13.1 K/UL (ref 4–11)
WBC #/AREA URNS HPF: NORMAL /HPF (ref 0–5)

## 2025-01-17 PROCEDURE — 92610 EVALUATE SWALLOWING FUNCTION: CPT

## 2025-01-17 PROCEDURE — 6360000002 HC RX W HCPCS

## 2025-01-17 PROCEDURE — 97116 GAIT TRAINING THERAPY: CPT

## 2025-01-17 PROCEDURE — 97535 SELF CARE MNGMENT TRAINING: CPT

## 2025-01-17 PROCEDURE — 2000000000 HC ICU R&B

## 2025-01-17 PROCEDURE — 6370000000 HC RX 637 (ALT 250 FOR IP)

## 2025-01-17 PROCEDURE — 2500000003 HC RX 250 WO HCPCS

## 2025-01-17 PROCEDURE — 2500000003 HC RX 250 WO HCPCS: Performed by: INTERNAL MEDICINE

## 2025-01-17 PROCEDURE — 80076 HEPATIC FUNCTION PANEL: CPT

## 2025-01-17 PROCEDURE — 6370000000 HC RX 637 (ALT 250 FOR IP): Performed by: INTERNAL MEDICINE

## 2025-01-17 PROCEDURE — 99291 CRITICAL CARE FIRST HOUR: CPT | Performed by: INTERNAL MEDICINE

## 2025-01-17 PROCEDURE — 83735 ASSAY OF MAGNESIUM: CPT

## 2025-01-17 PROCEDURE — 97530 THERAPEUTIC ACTIVITIES: CPT

## 2025-01-17 PROCEDURE — 36415 COLL VENOUS BLD VENIPUNCTURE: CPT

## 2025-01-17 PROCEDURE — 94761 N-INVAS EAR/PLS OXIMETRY MLT: CPT

## 2025-01-17 PROCEDURE — 99232 SBSQ HOSP IP/OBS MODERATE 35: CPT | Performed by: INTERNAL MEDICINE

## 2025-01-17 PROCEDURE — 97166 OT EVAL MOD COMPLEX 45 MIN: CPT

## 2025-01-17 PROCEDURE — 85025 COMPLETE CBC W/AUTO DIFF WBC: CPT

## 2025-01-17 PROCEDURE — 83880 ASSAY OF NATRIURETIC PEPTIDE: CPT

## 2025-01-17 PROCEDURE — 81001 URINALYSIS AUTO W/SCOPE: CPT

## 2025-01-17 PROCEDURE — 2580000003 HC RX 258

## 2025-01-17 PROCEDURE — 80069 RENAL FUNCTION PANEL: CPT

## 2025-01-17 PROCEDURE — 74230 X-RAY XM SWLNG FUNCJ C+: CPT

## 2025-01-17 PROCEDURE — 92611 MOTION FLUOROSCOPY/SWALLOW: CPT

## 2025-01-17 PROCEDURE — 97162 PT EVAL MOD COMPLEX 30 MIN: CPT

## 2025-01-17 PROCEDURE — 94660 CPAP INITIATION&MGMT: CPT

## 2025-01-17 PROCEDURE — 2700000000 HC OXYGEN THERAPY PER DAY

## 2025-01-17 RX ORDER — SPIRONOLACTONE 25 MG/1
12.5 TABLET ORAL DAILY
Status: DISCONTINUED | OUTPATIENT
Start: 2025-01-18 | End: 2025-01-17

## 2025-01-17 RX ORDER — AMIODARONE HYDROCHLORIDE 200 MG/1
400 TABLET ORAL 2 TIMES DAILY
Status: DISCONTINUED | OUTPATIENT
Start: 2025-01-17 | End: 2025-01-19

## 2025-01-17 RX ORDER — SPIRONOLACTONE 25 MG/1
12.5 TABLET ORAL DAILY
Status: DISCONTINUED | OUTPATIENT
Start: 2025-01-17 | End: 2025-01-18

## 2025-01-17 RX ORDER — SENNOSIDES A AND B 8.6 MG/1
1 TABLET, FILM COATED ORAL NIGHTLY PRN
Status: DISCONTINUED | OUTPATIENT
Start: 2025-01-17 | End: 2025-01-21 | Stop reason: HOSPADM

## 2025-01-17 RX ORDER — POTASSIUM CHLORIDE 7.45 MG/ML
10 INJECTION INTRAVENOUS
Status: DISPENSED | OUTPATIENT
Start: 2025-01-17 | End: 2025-01-17

## 2025-01-17 RX ORDER — POLYETHYLENE GLYCOL 3350 17 G/17G
17 POWDER, FOR SOLUTION ORAL DAILY
Status: DISCONTINUED | OUTPATIENT
Start: 2025-01-17 | End: 2025-01-21 | Stop reason: HOSPADM

## 2025-01-17 RX ORDER — VALSARTAN 80 MG/1
40 TABLET ORAL DAILY
Status: DISCONTINUED | OUTPATIENT
Start: 2025-01-18 | End: 2025-01-21 | Stop reason: HOSPADM

## 2025-01-17 RX ORDER — METOPROLOL SUCCINATE 25 MG/1
25 TABLET, EXTENDED RELEASE ORAL NIGHTLY
Status: DISCONTINUED | OUTPATIENT
Start: 2025-01-17 | End: 2025-01-20

## 2025-01-17 RX ORDER — SPIRONOLACTONE 25 MG/1
25 TABLET ORAL DAILY
Status: DISCONTINUED | OUTPATIENT
Start: 2025-01-17 | End: 2025-01-17

## 2025-01-17 RX ORDER — POTASSIUM CHLORIDE 1500 MG/1
40 TABLET, EXTENDED RELEASE ORAL ONCE
Status: COMPLETED | OUTPATIENT
Start: 2025-01-17 | End: 2025-01-17

## 2025-01-17 RX ORDER — ASPIRIN 81 MG/1
81 TABLET ORAL DAILY
Status: DISCONTINUED | OUTPATIENT
Start: 2025-01-17 | End: 2025-01-21 | Stop reason: HOSPADM

## 2025-01-17 RX ADMIN — SODIUM CHLORIDE, PRESERVATIVE FREE 10 ML: 5 INJECTION INTRAVENOUS at 09:26

## 2025-01-17 RX ADMIN — AMIODARONE HYDROCHLORIDE 0.5 MG/MIN: 1.8 INJECTION, SOLUTION INTRAVENOUS at 02:09

## 2025-01-17 RX ADMIN — ROSUVASTATIN CALCIUM 40 MG: 20 TABLET, FILM COATED ORAL at 20:56

## 2025-01-17 RX ADMIN — POTASSIUM BICARBONATE 40 MEQ: 782 TABLET, EFFERVESCENT ORAL at 06:30

## 2025-01-17 RX ADMIN — ENOXAPARIN SODIUM 30 MG: 100 INJECTION SUBCUTANEOUS at 20:57

## 2025-01-17 RX ADMIN — SPIRONOLACTONE 12.5 MG: 25 TABLET ORAL at 12:06

## 2025-01-17 RX ADMIN — DIBASIC SODIUM PHOSPHATE, MONOBASIC POTASSIUM PHOSPHATE AND MONOBASIC SODIUM PHOSPHATE 2 TABLET: 852; 155; 130 TABLET ORAL at 20:56

## 2025-01-17 RX ADMIN — AMIODARONE HYDROCHLORIDE 400 MG: 200 TABLET ORAL at 12:07

## 2025-01-17 RX ADMIN — TICAGRELOR 90 MG: 90 TABLET ORAL at 20:56

## 2025-01-17 RX ADMIN — POTASSIUM CHLORIDE 40 MEQ: 1500 TABLET, EXTENDED RELEASE ORAL at 01:39

## 2025-01-17 RX ADMIN — POTASSIUM BICARBONATE 40 MEQ: 782 TABLET, EFFERVESCENT ORAL at 02:13

## 2025-01-17 RX ADMIN — SODIUM CHLORIDE, PRESERVATIVE FREE 10 ML: 5 INJECTION INTRAVENOUS at 20:00

## 2025-01-17 RX ADMIN — INSULIN LISPRO 2 UNITS: 100 INJECTION, SOLUTION INTRAVENOUS; SUBCUTANEOUS at 20:57

## 2025-01-17 RX ADMIN — ENOXAPARIN SODIUM 30 MG: 100 INJECTION SUBCUTANEOUS at 09:25

## 2025-01-17 RX ADMIN — AMIODARONE HYDROCHLORIDE 400 MG: 200 TABLET ORAL at 20:56

## 2025-01-17 RX ADMIN — FAMOTIDINE 20 MG: 10 INJECTION, SOLUTION INTRAVENOUS at 20:57

## 2025-01-17 RX ADMIN — ASPIRIN 81 MG: 81 TABLET, COATED ORAL at 12:08

## 2025-01-17 RX ADMIN — METOPROLOL SUCCINATE 25 MG: 25 TABLET, FILM COATED, EXTENDED RELEASE ORAL at 20:56

## 2025-01-17 RX ADMIN — FAMOTIDINE 20 MG: 10 INJECTION, SOLUTION INTRAVENOUS at 09:26

## 2025-01-17 RX ADMIN — TICAGRELOR 90 MG: 90 TABLET ORAL at 12:07

## 2025-01-17 RX ADMIN — DIBASIC SODIUM PHOSPHATE, MONOBASIC POTASSIUM PHOSPHATE AND MONOBASIC SODIUM PHOSPHATE 2 TABLET: 852; 155; 130 TABLET ORAL at 12:07

## 2025-01-17 RX ADMIN — INSULIN LISPRO 2 UNITS: 100 INJECTION, SOLUTION INTRAVENOUS; SUBCUTANEOUS at 04:07

## 2025-01-17 ASSESSMENT — PAIN SCALES - GENERAL
PAINLEVEL_OUTOF10: 0

## 2025-01-17 NOTE — PROCEDURES
INSTRUMENTAL SWALLOW REPORT  MODIFIED BARIUM SWALLOW    NAME: Jan Devine     : 1955  MRN: [id number]       Date of Eval: 2025     Ordering Physician: Dr. Buster Woodward  Referring Diagnosis: Dysphagia    Past Medical History:  has a past medical history of COVID, Diabetes mellitus (HCC), Gastroesophageal reflux disease without esophagitis, Hypertension, Pneumonia due to COVID-19 virus, and Rash.  Past Surgical History:  has a past surgical history that includes Cardiac procedure (N/A, 2025); Cardiac procedure (N/A, 2025); and Cardiac procedure (N/A, 2025).    CXR: 25  Interval extubation.  Pulmonary vascular congestion.    Prior MBS Results: NA    Patient Complaints/Reason for Referral:  Jan Devine was referred for a MBS to assess the efficiency of his/her swallow function, assess for aspiration, and to make recommendations regarding safe dietary consistencies, effective compensatory strategies, and safe eating environment.    Onset of problem:   25    Pain   None indicated by any means.    Subjective:  Awake, alert, cooperative. Seated upright in stretcher, on O2 via NC. Accompanied by RN.    Impressions:  Oral Phase  Grossly WFL; appropriate oral acceptance, containment, timely mastication. Slowed A-P transit, but adequate clearance with no significant stasis.  Pharyngeal Phase  Grossly WFL; reduced/delayed swallow initiation and base of tongue retraction, however good airway protection maintained with no penetration, no aspiration. Mild vallecular/pyriform stasis; cleared with subsequent swallow.  Esophageal Phase  Indirectly assessed; appeared unremarkable    Treatment Dx and ICD 10: dysphagia  Position: Lateral and upright  Consistencies administered: thins via tsp/cup/straw, mildly thick via straw, puree, regular texture solid    Penetration Aspiration Scale (PAS)  [x] 1 Material does not enter the airway    Dysphagia Outcome Severity Scale  [x] Level 6

## 2025-01-17 NOTE — CARE COORDINATION
Case Management Assessment           Daily Note                 Date/ Time of Note: 1/17/2025 2:33 PM         Note completed by: Krystian Bowen RN    Patient Name: Jan Devine  YOB: 1955    Diagnosis:STEMI (ST elevation myocardial infarction) (HCC) [I21.3]  ST elevation myocardial infarction (STEMI), unspecified artery (HCC) [I21.3]  STEMI involving left anterior descending coronary artery (HCC) [I21.02]  Patient Admission Status: Inpatient  Date of Admission:1/12/2025  6:22 PM    Length of Stay: 5 GLOS: GMLOS: 3.8 Readmission Risk Score: Readmission Risk Score: 14.3    Current Plan of Care: extubated 1/16. Therapy recs ARU. Will need ARU consult   ________________________________________________________________________________________  PT AM-PAC: 15 / 24 per last evaluation on: 1/17    OT AM-PAC: 16 / 24 per last evaluation on: 1/17    ________________________________________________________________________________________  Discharge Plan: To Be Determined DUE TO: medical course  Pre-cert required for SNF: NO  COVID Result:    Lab Results   Component Value Date/Time    COVID19 Detected 01/20/2022 09:56 PM    COVID19 DETECTED 01/20/2022 09:29 PM    COVID19 NOT DETECTED 08/14/2020 11:26 AM       Transportation PLAN for discharge:  tbd    Tentative discharge date: tbd    Potential assistance Purchasing Medications: Potential Assistance Purchasing Medications: No  Does Patient want to participate in local refill/ meds to beds program?:      Current barriers to discharge: Medical complications    Referrals completed:     Resources/ information provided:   ________________________________________________________________________________________  Case Management Notes: if agreeable to ARU; patient will need ARU cons, no cert needed.     Jan and his family were provided with choice of provider; he and his family are in agreement with the discharge plan.    Emergency Contacts:  Extended Emergency

## 2025-01-17 NOTE — TELEPHONE ENCOUNTER
Spoke w/nanci, states LV is approved and no add'l info is needed. The [facility] is in contact with the unit and will follow and place LV at time of discharge.

## 2025-01-18 LAB
ALBUMIN SERPL-MCNC: 3.3 G/DL (ref 3.4–5)
ALBUMIN SERPL-MCNC: 3.5 G/DL (ref 3.4–5)
ALBUMIN SERPL-MCNC: 3.5 G/DL (ref 3.4–5)
ANION GAP SERPL CALCULATED.3IONS-SCNC: 10 MMOL/L (ref 3–16)
ANION GAP SERPL CALCULATED.3IONS-SCNC: 12 MMOL/L (ref 3–16)
ANION GAP SERPL CALCULATED.3IONS-SCNC: 9 MMOL/L (ref 3–16)
BASOPHILS # BLD: 0.1 K/UL (ref 0–0.2)
BASOPHILS NFR BLD: 0.6 %
BUN SERPL-MCNC: 28 MG/DL (ref 7–20)
CALCIUM SERPL-MCNC: 8.2 MG/DL (ref 8.3–10.6)
CALCIUM SERPL-MCNC: 8.5 MG/DL (ref 8.3–10.6)
CALCIUM SERPL-MCNC: 8.7 MG/DL (ref 8.3–10.6)
CHLORIDE SERPL-SCNC: 100 MMOL/L (ref 99–110)
CHLORIDE SERPL-SCNC: 101 MMOL/L (ref 99–110)
CHLORIDE SERPL-SCNC: 101 MMOL/L (ref 99–110)
CO2 SERPL-SCNC: 24 MMOL/L (ref 21–32)
CO2 SERPL-SCNC: 29 MMOL/L (ref 21–32)
CO2 SERPL-SCNC: 29 MMOL/L (ref 21–32)
CREAT SERPL-MCNC: 0.9 MG/DL (ref 0.8–1.3)
CREAT SERPL-MCNC: 0.9 MG/DL (ref 0.8–1.3)
CREAT SERPL-MCNC: 1 MG/DL (ref 0.8–1.3)
DEPRECATED RDW RBC AUTO: 13.7 % (ref 12.4–15.4)
EOSINOPHIL # BLD: 0.5 K/UL (ref 0–0.6)
EOSINOPHIL NFR BLD: 4.5 %
GFR SERPLBLD CREATININE-BSD FMLA CKD-EPI: 80 ML/MIN/{1.73_M2}
GFR SERPLBLD CREATININE-BSD FMLA CKD-EPI: >90 ML/MIN/{1.73_M2}
GFR SERPLBLD CREATININE-BSD FMLA CKD-EPI: >90 ML/MIN/{1.73_M2}
GLUCOSE BLD-MCNC: 132 MG/DL (ref 70–99)
GLUCOSE BLD-MCNC: 135 MG/DL (ref 70–99)
GLUCOSE BLD-MCNC: 155 MG/DL (ref 70–99)
GLUCOSE SERPL-MCNC: 126 MG/DL (ref 70–99)
GLUCOSE SERPL-MCNC: 141 MG/DL (ref 70–99)
GLUCOSE SERPL-MCNC: 148 MG/DL (ref 70–99)
HCT VFR BLD AUTO: 39.5 % (ref 40.5–52.5)
HGB BLD-MCNC: 13.4 G/DL (ref 13.5–17.5)
LYMPHOCYTES # BLD: 2 K/UL (ref 1–5.1)
LYMPHOCYTES NFR BLD: 16.3 %
MAGNESIUM SERPL-MCNC: 2.15 MG/DL (ref 1.8–2.4)
MAGNESIUM SERPL-MCNC: 2.19 MG/DL (ref 1.8–2.4)
MAGNESIUM SERPL-MCNC: 2.27 MG/DL (ref 1.8–2.4)
MCH RBC QN AUTO: 30.9 PG (ref 26–34)
MCHC RBC AUTO-ENTMCNC: 33.9 G/DL (ref 31–36)
MCV RBC AUTO: 91.2 FL (ref 80–100)
MONOCYTES # BLD: 1.4 K/UL (ref 0–1.3)
MONOCYTES NFR BLD: 11.7 %
NEUTROPHILS # BLD: 8 K/UL (ref 1.7–7.7)
NEUTROPHILS NFR BLD: 66.9 %
PERFORMED ON: ABNORMAL
PHOSPHATE SERPL-MCNC: 2.2 MG/DL (ref 2.5–4.9)
PHOSPHATE SERPL-MCNC: 2.5 MG/DL (ref 2.5–4.9)
PHOSPHATE SERPL-MCNC: 2.6 MG/DL (ref 2.5–4.9)
PLATELET # BLD AUTO: 210 K/UL (ref 135–450)
PMV BLD AUTO: 8.7 FL (ref 5–10.5)
POTASSIUM SERPL-SCNC: 3.3 MMOL/L (ref 3.5–5.1)
POTASSIUM SERPL-SCNC: 3.8 MMOL/L (ref 3.5–5.1)
POTASSIUM SERPL-SCNC: 4.4 MMOL/L (ref 3.5–5.1)
RBC # BLD AUTO: 4.33 M/UL (ref 4.2–5.9)
SODIUM SERPL-SCNC: 137 MMOL/L (ref 136–145)
SODIUM SERPL-SCNC: 139 MMOL/L (ref 136–145)
SODIUM SERPL-SCNC: 139 MMOL/L (ref 136–145)
WBC # BLD AUTO: 12 K/UL (ref 4–11)

## 2025-01-18 PROCEDURE — 6370000000 HC RX 637 (ALT 250 FOR IP): Performed by: INTERNAL MEDICINE

## 2025-01-18 PROCEDURE — 6370000000 HC RX 637 (ALT 250 FOR IP)

## 2025-01-18 PROCEDURE — 80069 RENAL FUNCTION PANEL: CPT

## 2025-01-18 PROCEDURE — 36415 COLL VENOUS BLD VENIPUNCTURE: CPT

## 2025-01-18 PROCEDURE — 99233 SBSQ HOSP IP/OBS HIGH 50: CPT | Performed by: INTERNAL MEDICINE

## 2025-01-18 PROCEDURE — 2580000003 HC RX 258

## 2025-01-18 PROCEDURE — 2500000003 HC RX 250 WO HCPCS

## 2025-01-18 PROCEDURE — 2700000000 HC OXYGEN THERAPY PER DAY

## 2025-01-18 PROCEDURE — 85025 COMPLETE CBC W/AUTO DIFF WBC: CPT

## 2025-01-18 PROCEDURE — 2500000003 HC RX 250 WO HCPCS: Performed by: INTERNAL MEDICINE

## 2025-01-18 PROCEDURE — 83735 ASSAY OF MAGNESIUM: CPT

## 2025-01-18 PROCEDURE — 94660 CPAP INITIATION&MGMT: CPT

## 2025-01-18 PROCEDURE — 6360000002 HC RX W HCPCS

## 2025-01-18 PROCEDURE — 94761 N-INVAS EAR/PLS OXIMETRY MLT: CPT

## 2025-01-18 PROCEDURE — 2000000000 HC ICU R&B

## 2025-01-18 RX ORDER — DIGOXIN 125 MCG
125 TABLET ORAL DAILY
Status: DISCONTINUED | OUTPATIENT
Start: 2025-01-18 | End: 2025-01-21 | Stop reason: HOSPADM

## 2025-01-18 RX ORDER — SPIRONOLACTONE 25 MG/1
12.5 TABLET ORAL ONCE
Status: COMPLETED | OUTPATIENT
Start: 2025-01-18 | End: 2025-01-18

## 2025-01-18 RX ORDER — GUAIFENESIN 600 MG/1
600 TABLET, EXTENDED RELEASE ORAL 2 TIMES DAILY
Status: DISCONTINUED | OUTPATIENT
Start: 2025-01-18 | End: 2025-01-21 | Stop reason: HOSPADM

## 2025-01-18 RX ORDER — INSULIN LISPRO 100 [IU]/ML
0-8 INJECTION, SOLUTION INTRAVENOUS; SUBCUTANEOUS
Status: DISCONTINUED | OUTPATIENT
Start: 2025-01-18 | End: 2025-01-21 | Stop reason: HOSPADM

## 2025-01-18 RX ORDER — SPIRONOLACTONE 25 MG/1
25 TABLET ORAL DAILY
Status: DISCONTINUED | OUTPATIENT
Start: 2025-01-19 | End: 2025-01-21 | Stop reason: HOSPADM

## 2025-01-18 RX ADMIN — TICAGRELOR 90 MG: 90 TABLET ORAL at 20:58

## 2025-01-18 RX ADMIN — ROSUVASTATIN CALCIUM 40 MG: 20 TABLET, FILM COATED ORAL at 20:58

## 2025-01-18 RX ADMIN — METOPROLOL SUCCINATE 25 MG: 25 TABLET, FILM COATED, EXTENDED RELEASE ORAL at 20:58

## 2025-01-18 RX ADMIN — GUAIFENESIN 600 MG: 600 TABLET, EXTENDED RELEASE ORAL at 10:55

## 2025-01-18 RX ADMIN — DIBASIC SODIUM PHOSPHATE, MONOBASIC POTASSIUM PHOSPHATE AND MONOBASIC SODIUM PHOSPHATE 2 TABLET: 852; 155; 130 TABLET ORAL at 20:58

## 2025-01-18 RX ADMIN — AMIODARONE HYDROCHLORIDE 400 MG: 200 TABLET ORAL at 20:58

## 2025-01-18 RX ADMIN — DIBASIC SODIUM PHOSPHATE, MONOBASIC POTASSIUM PHOSPHATE AND MONOBASIC SODIUM PHOSPHATE 2 TABLET: 852; 155; 130 TABLET ORAL at 07:57

## 2025-01-18 RX ADMIN — ENOXAPARIN SODIUM 30 MG: 100 INJECTION SUBCUTANEOUS at 20:58

## 2025-01-18 RX ADMIN — FAMOTIDINE 20 MG: 10 INJECTION, SOLUTION INTRAVENOUS at 07:57

## 2025-01-18 RX ADMIN — AMIODARONE HYDROCHLORIDE 400 MG: 200 TABLET ORAL at 07:57

## 2025-01-18 RX ADMIN — FAMOTIDINE 20 MG: 10 INJECTION, SOLUTION INTRAVENOUS at 20:59

## 2025-01-18 RX ADMIN — SODIUM CHLORIDE, PRESERVATIVE FREE 10 ML: 5 INJECTION INTRAVENOUS at 07:58

## 2025-01-18 RX ADMIN — DIGOXIN 125 MCG: 0.12 TABLET ORAL at 10:56

## 2025-01-18 RX ADMIN — TICAGRELOR 90 MG: 90 TABLET ORAL at 07:57

## 2025-01-18 RX ADMIN — SPIRONOLACTONE 12.5 MG: 25 TABLET ORAL at 07:55

## 2025-01-18 RX ADMIN — SPIRONOLACTONE 12.5 MG: 25 TABLET ORAL at 10:55

## 2025-01-18 RX ADMIN — POTASSIUM BICARBONATE 40 MEQ: 782 TABLET, EFFERVESCENT ORAL at 04:53

## 2025-01-18 RX ADMIN — VALSARTAN 40 MG: 80 TABLET, FILM COATED ORAL at 07:57

## 2025-01-18 RX ADMIN — SODIUM CHLORIDE, PRESERVATIVE FREE 10 ML: 5 INJECTION INTRAVENOUS at 20:59

## 2025-01-18 RX ADMIN — ASPIRIN 81 MG: 81 TABLET, COATED ORAL at 07:57

## 2025-01-18 RX ADMIN — ENOXAPARIN SODIUM 30 MG: 100 INJECTION SUBCUTANEOUS at 08:01

## 2025-01-18 RX ADMIN — GUAIFENESIN 600 MG: 600 TABLET, EXTENDED RELEASE ORAL at 20:58

## 2025-01-18 ASSESSMENT — PAIN SCALES - GENERAL
PAINLEVEL_OUTOF10: 0

## 2025-01-19 LAB
ANION GAP SERPL CALCULATED.3IONS-SCNC: 11 MMOL/L (ref 3–16)
BASOPHILS # BLD: 0 K/UL (ref 0–0.2)
BASOPHILS NFR BLD: 0.4 %
BUN SERPL-MCNC: 26 MG/DL (ref 7–20)
CALCIUM SERPL-MCNC: 8.5 MG/DL (ref 8.3–10.6)
CHLORIDE SERPL-SCNC: 103 MMOL/L (ref 99–110)
CO2 SERPL-SCNC: 24 MMOL/L (ref 21–32)
CREAT SERPL-MCNC: 1.1 MG/DL (ref 0.8–1.3)
DEPRECATED RDW RBC AUTO: 13.7 % (ref 12.4–15.4)
EOSINOPHIL # BLD: 0.9 K/UL (ref 0–0.6)
EOSINOPHIL NFR BLD: 6.8 %
GFR SERPLBLD CREATININE-BSD FMLA CKD-EPI: 71 ML/MIN/{1.73_M2}
GLUCOSE BLD-MCNC: 120 MG/DL (ref 70–99)
GLUCOSE BLD-MCNC: 149 MG/DL (ref 70–99)
GLUCOSE BLD-MCNC: 157 MG/DL (ref 70–99)
GLUCOSE BLD-MCNC: 158 MG/DL (ref 70–99)
GLUCOSE SERPL-MCNC: 191 MG/DL (ref 70–99)
HCT VFR BLD AUTO: 41.6 % (ref 40.5–52.5)
HGB BLD-MCNC: 14 G/DL (ref 13.5–17.5)
LYMPHOCYTES # BLD: 2.1 K/UL (ref 1–5.1)
LYMPHOCYTES NFR BLD: 16 %
MCH RBC QN AUTO: 30.4 PG (ref 26–34)
MCHC RBC AUTO-ENTMCNC: 33.5 G/DL (ref 31–36)
MCV RBC AUTO: 90.8 FL (ref 80–100)
MONOCYTES # BLD: 1.4 K/UL (ref 0–1.3)
MONOCYTES NFR BLD: 10.5 %
NEUTROPHILS # BLD: 8.5 K/UL (ref 1.7–7.7)
NEUTROPHILS NFR BLD: 66.3 %
PERFORMED ON: ABNORMAL
PLATELET # BLD AUTO: 229 K/UL (ref 135–450)
PMV BLD AUTO: 8 FL (ref 5–10.5)
POTASSIUM SERPL-SCNC: 3.3 MMOL/L (ref 3.5–5.1)
RBC # BLD AUTO: 4.58 M/UL (ref 4.2–5.9)
SODIUM SERPL-SCNC: 138 MMOL/L (ref 136–145)
WBC # BLD AUTO: 12.8 K/UL (ref 4–11)

## 2025-01-19 PROCEDURE — 2700000000 HC OXYGEN THERAPY PER DAY

## 2025-01-19 PROCEDURE — 6370000000 HC RX 637 (ALT 250 FOR IP)

## 2025-01-19 PROCEDURE — 2500000003 HC RX 250 WO HCPCS

## 2025-01-19 PROCEDURE — 80048 BASIC METABOLIC PNL TOTAL CA: CPT

## 2025-01-19 PROCEDURE — 2060000000 HC ICU INTERMEDIATE R&B

## 2025-01-19 PROCEDURE — 6370000000 HC RX 637 (ALT 250 FOR IP): Performed by: INTERNAL MEDICINE

## 2025-01-19 PROCEDURE — 2500000003 HC RX 250 WO HCPCS: Performed by: INTERNAL MEDICINE

## 2025-01-19 PROCEDURE — 94761 N-INVAS EAR/PLS OXIMETRY MLT: CPT

## 2025-01-19 PROCEDURE — 6360000002 HC RX W HCPCS

## 2025-01-19 PROCEDURE — 99233 SBSQ HOSP IP/OBS HIGH 50: CPT | Performed by: INTERNAL MEDICINE

## 2025-01-19 PROCEDURE — 36415 COLL VENOUS BLD VENIPUNCTURE: CPT

## 2025-01-19 PROCEDURE — 85025 COMPLETE CBC W/AUTO DIFF WBC: CPT

## 2025-01-19 PROCEDURE — 2580000003 HC RX 258

## 2025-01-19 PROCEDURE — 94660 CPAP INITIATION&MGMT: CPT

## 2025-01-19 RX ORDER — AMIODARONE HYDROCHLORIDE 200 MG/1
400 TABLET ORAL DAILY
Status: DISCONTINUED | OUTPATIENT
Start: 2025-01-20 | End: 2025-01-21 | Stop reason: HOSPADM

## 2025-01-19 RX ORDER — POTASSIUM CHLORIDE 1500 MG/1
40 TABLET, EXTENDED RELEASE ORAL ONCE
Status: DISCONTINUED | OUTPATIENT
Start: 2025-01-19 | End: 2025-01-19

## 2025-01-19 RX ADMIN — EMPAGLIFLOZIN 10 MG: 10 TABLET, FILM COATED ORAL at 11:44

## 2025-01-19 RX ADMIN — TICAGRELOR 90 MG: 90 TABLET ORAL at 08:27

## 2025-01-19 RX ADMIN — DIBASIC SODIUM PHOSPHATE, MONOBASIC POTASSIUM PHOSPHATE AND MONOBASIC SODIUM PHOSPHATE 2 TABLET: 852; 155; 130 TABLET ORAL at 21:34

## 2025-01-19 RX ADMIN — ENOXAPARIN SODIUM 30 MG: 100 INJECTION SUBCUTANEOUS at 21:48

## 2025-01-19 RX ADMIN — DIBASIC SODIUM PHOSPHATE, MONOBASIC POTASSIUM PHOSPHATE AND MONOBASIC SODIUM PHOSPHATE 2 TABLET: 852; 155; 130 TABLET ORAL at 08:27

## 2025-01-19 RX ADMIN — METOPROLOL SUCCINATE 25 MG: 25 TABLET, FILM COATED, EXTENDED RELEASE ORAL at 21:46

## 2025-01-19 RX ADMIN — SODIUM CHLORIDE, PRESERVATIVE FREE 10 ML: 5 INJECTION INTRAVENOUS at 08:27

## 2025-01-19 RX ADMIN — GUAIFENESIN 600 MG: 600 TABLET, EXTENDED RELEASE ORAL at 08:27

## 2025-01-19 RX ADMIN — SODIUM CHLORIDE, PRESERVATIVE FREE 10 ML: 5 INJECTION INTRAVENOUS at 19:46

## 2025-01-19 RX ADMIN — ROSUVASTATIN CALCIUM 40 MG: 20 TABLET, FILM COATED ORAL at 21:34

## 2025-01-19 RX ADMIN — GUAIFENESIN 600 MG: 600 TABLET, EXTENDED RELEASE ORAL at 21:34

## 2025-01-19 RX ADMIN — TICAGRELOR 90 MG: 90 TABLET ORAL at 22:10

## 2025-01-19 RX ADMIN — POTASSIUM BICARBONATE 40 MEQ: 782 TABLET, EFFERVESCENT ORAL at 11:44

## 2025-01-19 RX ADMIN — ASPIRIN 81 MG: 81 TABLET, COATED ORAL at 08:26

## 2025-01-19 RX ADMIN — SPIRONOLACTONE 25 MG: 25 TABLET ORAL at 08:27

## 2025-01-19 RX ADMIN — AMIODARONE HYDROCHLORIDE 400 MG: 200 TABLET ORAL at 08:26

## 2025-01-19 RX ADMIN — ENOXAPARIN SODIUM 30 MG: 100 INJECTION SUBCUTANEOUS at 08:26

## 2025-01-19 RX ADMIN — FAMOTIDINE 20 MG: 10 INJECTION, SOLUTION INTRAVENOUS at 08:26

## 2025-01-19 RX ADMIN — VALSARTAN 40 MG: 80 TABLET, FILM COATED ORAL at 08:26

## 2025-01-19 RX ADMIN — DIGOXIN 125 MCG: 0.12 TABLET ORAL at 08:27

## 2025-01-19 ASSESSMENT — PAIN SCALES - GENERAL
PAINLEVEL_OUTOF10: 0

## 2025-01-20 LAB
ANION GAP SERPL CALCULATED.3IONS-SCNC: 12 MMOL/L (ref 3–16)
BASOPHILS # BLD: 0.1 K/UL (ref 0–0.2)
BASOPHILS NFR BLD: 1 %
BUN SERPL-MCNC: 24 MG/DL (ref 7–20)
CALCIUM SERPL-MCNC: 8.9 MG/DL (ref 8.3–10.6)
CHLORIDE SERPL-SCNC: 104 MMOL/L (ref 99–110)
CO2 SERPL-SCNC: 24 MMOL/L (ref 21–32)
CREAT SERPL-MCNC: 1.2 MG/DL (ref 0.8–1.3)
DEPRECATED RDW RBC AUTO: 13.6 % (ref 12.4–15.4)
EOSINOPHIL # BLD: 0.9 K/UL (ref 0–0.6)
EOSINOPHIL NFR BLD: 7.1 %
GFR SERPLBLD CREATININE-BSD FMLA CKD-EPI: 64 ML/MIN/{1.73_M2}
GLUCOSE BLD-MCNC: 111 MG/DL (ref 70–99)
GLUCOSE BLD-MCNC: 112 MG/DL (ref 70–99)
GLUCOSE BLD-MCNC: 116 MG/DL (ref 70–99)
GLUCOSE BLD-MCNC: 131 MG/DL (ref 70–99)
GLUCOSE SERPL-MCNC: 121 MG/DL (ref 70–99)
HCT VFR BLD AUTO: 40.9 % (ref 40.5–52.5)
HGB BLD-MCNC: 13.9 G/DL (ref 13.5–17.5)
LYMPHOCYTES # BLD: 2.5 K/UL (ref 1–5.1)
LYMPHOCYTES NFR BLD: 20.3 %
MCH RBC QN AUTO: 30.7 PG (ref 26–34)
MCHC RBC AUTO-ENTMCNC: 34 G/DL (ref 31–36)
MCV RBC AUTO: 90.3 FL (ref 80–100)
MONOCYTES # BLD: 1.4 K/UL (ref 0–1.3)
MONOCYTES NFR BLD: 11.2 %
NEUTROPHILS # BLD: 7.4 K/UL (ref 1.7–7.7)
NEUTROPHILS NFR BLD: 60.4 %
PERFORMED ON: ABNORMAL
PLATELET # BLD AUTO: 258 K/UL (ref 135–450)
PMV BLD AUTO: 8.2 FL (ref 5–10.5)
POTASSIUM SERPL-SCNC: 3.6 MMOL/L (ref 3.5–5.1)
RBC # BLD AUTO: 4.53 M/UL (ref 4.2–5.9)
SODIUM SERPL-SCNC: 140 MMOL/L (ref 136–145)
WBC # BLD AUTO: 12.3 K/UL (ref 4–11)

## 2025-01-20 PROCEDURE — 80048 BASIC METABOLIC PNL TOTAL CA: CPT

## 2025-01-20 PROCEDURE — 36592 COLLECT BLOOD FROM PICC: CPT

## 2025-01-20 PROCEDURE — 97530 THERAPEUTIC ACTIVITIES: CPT

## 2025-01-20 PROCEDURE — 2060000000 HC ICU INTERMEDIATE R&B

## 2025-01-20 PROCEDURE — 97535 SELF CARE MNGMENT TRAINING: CPT

## 2025-01-20 PROCEDURE — 2500000003 HC RX 250 WO HCPCS: Performed by: INTERNAL MEDICINE

## 2025-01-20 PROCEDURE — 6370000000 HC RX 637 (ALT 250 FOR IP): Performed by: INTERNAL MEDICINE

## 2025-01-20 PROCEDURE — 6370000000 HC RX 637 (ALT 250 FOR IP)

## 2025-01-20 PROCEDURE — 85025 COMPLETE CBC W/AUTO DIFF WBC: CPT

## 2025-01-20 PROCEDURE — 6360000002 HC RX W HCPCS

## 2025-01-20 PROCEDURE — 97116 GAIT TRAINING THERAPY: CPT

## 2025-01-20 PROCEDURE — 36415 COLL VENOUS BLD VENIPUNCTURE: CPT

## 2025-01-20 PROCEDURE — 99233 SBSQ HOSP IP/OBS HIGH 50: CPT | Performed by: INTERNAL MEDICINE

## 2025-01-20 RX ORDER — METOPROLOL SUCCINATE 50 MG/1
50 TABLET, EXTENDED RELEASE ORAL NIGHTLY
Status: DISCONTINUED | OUTPATIENT
Start: 2025-01-20 | End: 2025-01-21 | Stop reason: HOSPADM

## 2025-01-20 RX ADMIN — VALSARTAN 40 MG: 80 TABLET, FILM COATED ORAL at 09:43

## 2025-01-20 RX ADMIN — SPIRONOLACTONE 25 MG: 25 TABLET ORAL at 09:42

## 2025-01-20 RX ADMIN — GUAIFENESIN 600 MG: 600 TABLET, EXTENDED RELEASE ORAL at 21:57

## 2025-01-20 RX ADMIN — ASPIRIN 81 MG: 81 TABLET, COATED ORAL at 09:41

## 2025-01-20 RX ADMIN — SODIUM CHLORIDE, PRESERVATIVE FREE 10 ML: 5 INJECTION INTRAVENOUS at 09:42

## 2025-01-20 RX ADMIN — ENOXAPARIN SODIUM 30 MG: 100 INJECTION SUBCUTANEOUS at 09:43

## 2025-01-20 RX ADMIN — GUAIFENESIN 600 MG: 600 TABLET, EXTENDED RELEASE ORAL at 09:41

## 2025-01-20 RX ADMIN — TICAGRELOR 90 MG: 90 TABLET ORAL at 09:42

## 2025-01-20 RX ADMIN — ENOXAPARIN SODIUM 30 MG: 100 INJECTION SUBCUTANEOUS at 22:00

## 2025-01-20 RX ADMIN — ROSUVASTATIN CALCIUM 40 MG: 20 TABLET, FILM COATED ORAL at 21:57

## 2025-01-20 RX ADMIN — DIBASIC SODIUM PHOSPHATE, MONOBASIC POTASSIUM PHOSPHATE AND MONOBASIC SODIUM PHOSPHATE 2 TABLET: 852; 155; 130 TABLET ORAL at 09:42

## 2025-01-20 RX ADMIN — DIBASIC SODIUM PHOSPHATE, MONOBASIC POTASSIUM PHOSPHATE AND MONOBASIC SODIUM PHOSPHATE 2 TABLET: 852; 155; 130 TABLET ORAL at 21:57

## 2025-01-20 RX ADMIN — TICAGRELOR 90 MG: 90 TABLET ORAL at 21:58

## 2025-01-20 RX ADMIN — DIGOXIN 125 MCG: 0.12 TABLET ORAL at 09:41

## 2025-01-20 RX ADMIN — AMIODARONE HYDROCHLORIDE 400 MG: 200 TABLET ORAL at 09:40

## 2025-01-20 RX ADMIN — EMPAGLIFLOZIN 10 MG: 10 TABLET, FILM COATED ORAL at 09:41

## 2025-01-20 RX ADMIN — SODIUM CHLORIDE, PRESERVATIVE FREE 10 ML: 5 INJECTION INTRAVENOUS at 19:23

## 2025-01-20 ASSESSMENT — PAIN SCALES - GENERAL
PAINLEVEL_OUTOF10: 0

## 2025-01-20 NOTE — CARE COORDINATION
Addendum at 2:50pm: Spoke with PT/OT earlier who states will work with pt.    Addendum at 11:49am: Discussed with RN and Charge RN. RN will ask PT/OT to work with pt again.    11:10am: Chart review completed. GENE notes ARU recs from 1/17.    Spoke with pt and his wife at bedside this AM with pt permission. Discussed the ARU and provided with an ARU list. Pt stated he just walked the halls with the Unit Manager but is open to seeing what therapy has to say and the rehab doctor.     Perfect serve sent to Dr. Jeffrey updating on the ARU recs and the need for an ARU consult.     No precert needed for ARU.     GENE will follow    CHELSIE Noel   for [facility] ([facility])  [employer] Mobile: [phone]

## 2025-01-21 VITALS
TEMPERATURE: 97.8 F | BODY MASS INDEX: 34.13 KG/M2 | HEART RATE: 81 BPM | RESPIRATION RATE: 17 BRPM | SYSTOLIC BLOOD PRESSURE: 157 MMHG | WEIGHT: 243.8 LBS | DIASTOLIC BLOOD PRESSURE: 80 MMHG | HEIGHT: 71 IN | OXYGEN SATURATION: 98 %

## 2025-01-21 LAB
ANION GAP SERPL CALCULATED.3IONS-SCNC: 15 MMOL/L (ref 3–16)
BASOPHILS # BLD: 0.1 K/UL (ref 0–0.2)
BASOPHILS NFR BLD: 0.8 %
BUN SERPL-MCNC: 24 MG/DL (ref 7–20)
CALCIUM SERPL-MCNC: 8.7 MG/DL (ref 8.3–10.6)
CHLORIDE SERPL-SCNC: 105 MMOL/L (ref 99–110)
CO2 SERPL-SCNC: 18 MMOL/L (ref 21–32)
CREAT SERPL-MCNC: 1.1 MG/DL (ref 0.8–1.3)
DEPRECATED RDW RBC AUTO: 13.4 % (ref 12.4–15.4)
EOSINOPHIL # BLD: 0.8 K/UL (ref 0–0.6)
EOSINOPHIL NFR BLD: 6.4 %
GFR SERPLBLD CREATININE-BSD FMLA CKD-EPI: 71 ML/MIN/{1.73_M2}
GLUCOSE BLD-MCNC: 104 MG/DL (ref 70–99)
GLUCOSE SERPL-MCNC: 117 MG/DL (ref 70–99)
HCT VFR BLD AUTO: 42.6 % (ref 40.5–52.5)
HGB BLD-MCNC: 14.6 G/DL (ref 13.5–17.5)
LYMPHOCYTES # BLD: 2.4 K/UL (ref 1–5.1)
LYMPHOCYTES NFR BLD: 19 %
MCH RBC QN AUTO: 31 PG (ref 26–34)
MCHC RBC AUTO-ENTMCNC: 34.2 G/DL (ref 31–36)
MCV RBC AUTO: 90.8 FL (ref 80–100)
MONOCYTES # BLD: 1.5 K/UL (ref 0–1.3)
MONOCYTES NFR BLD: 11.9 %
NEUTROPHILS # BLD: 7.7 K/UL (ref 1.7–7.7)
NEUTROPHILS NFR BLD: 61.9 %
PERFORMED ON: ABNORMAL
PHOSPHATE SERPL-MCNC: 3.5 MG/DL (ref 2.5–4.9)
PLATELET # BLD AUTO: 230 K/UL (ref 135–450)
PMV BLD AUTO: 8.4 FL (ref 5–10.5)
POTASSIUM SERPL-SCNC: 3.7 MMOL/L (ref 3.5–5.1)
RBC # BLD AUTO: 4.69 M/UL (ref 4.2–5.9)
SODIUM SERPL-SCNC: 138 MMOL/L (ref 136–145)
WBC # BLD AUTO: 12.4 K/UL (ref 4–11)

## 2025-01-21 PROCEDURE — 2500000003 HC RX 250 WO HCPCS: Performed by: INTERNAL MEDICINE

## 2025-01-21 PROCEDURE — 6360000002 HC RX W HCPCS

## 2025-01-21 PROCEDURE — 36592 COLLECT BLOOD FROM PICC: CPT

## 2025-01-21 PROCEDURE — 6370000000 HC RX 637 (ALT 250 FOR IP): Performed by: INTERNAL MEDICINE

## 2025-01-21 PROCEDURE — 6370000000 HC RX 637 (ALT 250 FOR IP)

## 2025-01-21 PROCEDURE — 36415 COLL VENOUS BLD VENIPUNCTURE: CPT

## 2025-01-21 PROCEDURE — 99239 HOSP IP/OBS DSCHRG MGMT >30: CPT | Performed by: INTERNAL MEDICINE

## 2025-01-21 PROCEDURE — 6360000002 HC RX W HCPCS: Performed by: INTERNAL MEDICINE

## 2025-01-21 PROCEDURE — 85025 COMPLETE CBC W/AUTO DIFF WBC: CPT

## 2025-01-21 PROCEDURE — 84100 ASSAY OF PHOSPHORUS: CPT

## 2025-01-21 PROCEDURE — 80048 BASIC METABOLIC PNL TOTAL CA: CPT

## 2025-01-21 RX ORDER — ROSUVASTATIN CALCIUM 40 MG/1
40 TABLET, COATED ORAL NIGHTLY
Qty: 30 TABLET | Refills: 3 | Status: SHIPPED | OUTPATIENT
Start: 2025-01-21

## 2025-01-21 RX ORDER — AMIODARONE HYDROCHLORIDE 400 MG/1
400 TABLET ORAL DAILY
Qty: 30 TABLET | Refills: 0 | Status: SHIPPED | OUTPATIENT
Start: 2025-01-22 | End: 2025-01-23 | Stop reason: ALTCHOICE

## 2025-01-21 RX ORDER — FUROSEMIDE 10 MG/ML
20 INJECTION INTRAMUSCULAR; INTRAVENOUS ONCE
Status: COMPLETED | OUTPATIENT
Start: 2025-01-21 | End: 2025-01-21

## 2025-01-21 RX ORDER — FUROSEMIDE 20 MG/1
20 TABLET ORAL PRN
Qty: 90 TABLET | Refills: 1 | Status: SHIPPED | OUTPATIENT
Start: 2025-01-21

## 2025-01-21 RX ORDER — SPIRONOLACTONE 25 MG/1
37.5 TABLET ORAL DAILY
Qty: 45 TABLET | Refills: 3 | Status: SHIPPED | OUTPATIENT
Start: 2025-01-22

## 2025-01-21 RX ORDER — ASPIRIN 81 MG/1
81 TABLET ORAL DAILY
Qty: 30 TABLET | Refills: 3 | Status: SHIPPED | OUTPATIENT
Start: 2025-01-22

## 2025-01-21 RX ORDER — METOPROLOL SUCCINATE 50 MG/1
50 TABLET, EXTENDED RELEASE ORAL NIGHTLY
Qty: 30 TABLET | Refills: 3 | Status: SHIPPED | OUTPATIENT
Start: 2025-01-21

## 2025-01-21 RX ORDER — NITROGLYCERIN 0.4 MG/1
0.4 TABLET SUBLINGUAL EVERY 5 MIN PRN
Qty: 25 TABLET | Refills: 3 | Status: SHIPPED | OUTPATIENT
Start: 2025-01-21

## 2025-01-21 RX ORDER — DIGOXIN 125 MCG
125 TABLET ORAL DAILY
Qty: 30 TABLET | Refills: 3 | Status: SHIPPED | OUTPATIENT
Start: 2025-01-22

## 2025-01-21 RX ORDER — VALSARTAN 40 MG/1
40 TABLET ORAL DAILY
Qty: 30 TABLET | Refills: 3 | Status: SHIPPED | OUTPATIENT
Start: 2025-01-22

## 2025-01-21 RX ADMIN — TICAGRELOR 90 MG: 90 TABLET ORAL at 08:54

## 2025-01-21 RX ADMIN — DIGOXIN 125 MCG: 0.12 TABLET ORAL at 08:53

## 2025-01-21 RX ADMIN — GUAIFENESIN 600 MG: 600 TABLET, EXTENDED RELEASE ORAL at 08:53

## 2025-01-21 RX ADMIN — SODIUM CHLORIDE, PRESERVATIVE FREE 10 ML: 5 INJECTION INTRAVENOUS at 08:54

## 2025-01-21 RX ADMIN — FUROSEMIDE 20 MG: 10 INJECTION, SOLUTION INTRAMUSCULAR; INTRAVENOUS at 12:01

## 2025-01-21 RX ADMIN — DIBASIC SODIUM PHOSPHATE, MONOBASIC POTASSIUM PHOSPHATE AND MONOBASIC SODIUM PHOSPHATE 2 TABLET: 852; 155; 130 TABLET ORAL at 08:53

## 2025-01-21 RX ADMIN — EMPAGLIFLOZIN 10 MG: 10 TABLET, FILM COATED ORAL at 08:54

## 2025-01-21 RX ADMIN — ASPIRIN 81 MG: 81 TABLET, COATED ORAL at 08:53

## 2025-01-21 RX ADMIN — VALSARTAN 40 MG: 80 TABLET, FILM COATED ORAL at 08:53

## 2025-01-21 RX ADMIN — SPIRONOLACTONE 25 MG: 25 TABLET ORAL at 08:53

## 2025-01-21 RX ADMIN — AMIODARONE HYDROCHLORIDE 400 MG: 200 TABLET ORAL at 08:53

## 2025-01-21 RX ADMIN — ENOXAPARIN SODIUM 30 MG: 100 INJECTION SUBCUTANEOUS at 08:55

## 2025-01-21 ASSESSMENT — PAIN SCALES - GENERAL: PAINLEVEL_OUTOF10: 0

## 2025-01-21 NOTE — CONSULTS
PRELIMINARY PROCEDURE REPORT        INDICATION FOR PROCEDURE  Anterior STEMI      PROCEDURE FINDINGS  Successful left heart cath via right radial approach, access closed with TR band with excellent hemostasis  Total thrombotic occlusion of proximal LAD as culprit for anterior STEMI treated with IVUS guided PCI and placement of drug-eluting stent with excellent result  Moderately reduced LV function, LVEF 35 to 40% with anterior and apical wall hypokinesis  Severely elevated left-sided filling pressures, LVEDP 27 mmHg at the beginning of the procedure and 22 mmHg at the end after administering 40 IV Lasix      PLAN/RECOMMENDATIONS  IV Lasix for volume overload  BiPAP for respiratory support  Admit to ICU  Continue aspirin 81, Brilinta 90 twice daily, high intensity statin  We will plan to do triple therapy for 1 month followed by Brilinta and NOAC double therapy  Start IV heparin once TR band removed for new onset A-fib  Discontinue Integrilin once IV heparin started  Rate control for A-fib with RVR  We will institute GDMT for CAD and ischemic cardiomyopathy as hemodynamics stabilize, patient remains in critical condition  Obtain TTE  Repeat EKG to confirm rhythm and assess ST changes post PCI  Cardiac rehab      No complications.    See full report for details.      Hero Silva MD, FACC, [facility]  Interventional Cardiology  [facility]  [phone] (c)    
Consult Note  Physical Medicine and Rehabilitation    Patient: Jan Devine  [id number]  Date: 1/21/2025      Chief Complaint: chest pain, N/V    History of Present Illness/Hospital Course:  Jan Devine is a 69 y.o. male with pmhx T2DM, HTN, HLD who presented from home on 1/13 with sudden onset chest pain, N/V while eating [place]. EKG showed lateral and anteroseptal STEMI, cath lab activated and pt had HECTOR of proximal LAD. Noted to have Afib/RVR treated with rate control and required Bipap as well as lasix for presumed HF exac (EF 35-40% on cath). Pt was started on integrillin drip and sent to ICU. Notably, pt had Vfib cardiac arrest 3 times, ROSC achieved after defibrillation and amio and low K noted to be possible culprit which has since been replaced.     Prior Level of Function:  Independent for mobility, ADLs, and IADLs    Current Level of Function:  Improving, transferring and ambulating with SBA. Decreased strength and functional mobility.    Pertinent Social History:  Support: spouse  Home set-up: one level home, stairs, rub, no bathroom equipment, has rolling walker.    Past Medical History:   Diagnosis Date    COVID 1/24/2022    Diabetes mellitus (HCC)     Gastroesophageal reflux disease without esophagitis     Hypertension     Pneumonia due to COVID-19 virus     Rash        Past Surgical History:   Procedure Laterality Date    CARDIAC PROCEDURE N/A 1/12/2025    Left heart cath / coronary angiography performed by Hero Silva MD at [facility] CARDIAC CATH LAB    CARDIAC PROCEDURE N/A 1/12/2025    Percutaneous coronary intervention performed by Hero Silva MD at [facility] CARDIAC CATH LAB    CARDIAC PROCEDURE N/A 1/12/2025    Intravascular ultrasound performed by Hero Silva MD at [facility] CARDIAC CATH LAB       Family History   Problem Relation Age of Onset    Diabetes Mother     No Known Problems Father        Social History     Socioeconomic History    Marital status:      Spouse name: None    Number 
Department of General Surgery  Surgical Service    Line Consultation    Reason for Consult: Access     [place]:  CARLOS HOUSE is a 69 y.o.  male recently admitted on 1/12/25 with STEMI.  Code blue called, general surgery assistance with central access requested.     Past Medical History:   has a past medical history of COVID, Diabetes mellitus (HCC), Gastroesophageal reflux disease without esophagitis, Hypertension, Pneumonia due to COVID-19 virus, and Rash.     Past Surgical History:   has no past surgical history on file.     Medications:  Prior to Admission medications    Medication Sig Start Date End Date Taking? Authorizing Provider   valsartan-hydroCHLOROthiazide (DIOVAN-HCT) 320-25 MG per tablet Take 1 tablet by mouth daily 12/24/24  Yes Ashley Lagunas MD   blood glucose test strips (ONETOUCH VERIO) strip TEST DAILY AS NEEDED 10/25/24  Yes Ashley Lagunas MD   glucose monitoring kit 1 kit by Does not apply route daily 10/25/24  Yes Ashley Lagunas MD   [employer] Lancets MISC 1 each by Does not apply route daily 10/25/24  Yes Ashley Lagunas MD   Continuous Glucose Sensor (FREESTYLE SHAISTA 3 SENSOR) MISC Use daily as needed. 10/8/24  Yes Ashley Lagunas MD   metFORMIN (GLUCOPHAGE-XR) 500 MG extended release tablet Take 1 tablet by mouth daily (with breakfast) 10/2/24  Yes Ashley Lagunas MD   pravastatin (PRAVACHOL) 40 MG tablet TAKE 1 TABLET BY MOUTH DAILY 8/28/24  Yes Calos Ny MD   famotidine (PEPCID) 20 MG tablet Take 1 tablet by mouth daily   Yes ProviderMindi MD       Allergies:  Patient has no known allergies.    Family History:  family history includes Diabetes in his mother; No Known Problems in his father.    Social History:   reports that he has never smoked. He has never used smokeless tobacco. He reports current alcohol use. He reports that he does not use drugs.     REVIEW OF SYSTEMS:    A 5 point review of systems was completed     PHYSICAL EXAM:    VITALS:  BP (!) 
[facility] - INITIAL CONSULTATION        CHIEF COMPLAINT  Chest pain, anterolateral STEMI      HISTORY OF PRESENTING ILLNESS  Jan Devine is a 69 y.o. patient with history of hypertension hyperlipidemia no known cardiac disease who presented to the hospital with complaints of ongoing chest pain, nausea and vomiting.  Symptoms started around 3 PM this afternoon when he started to feel nauseous followed by an episode of vomiting.  Following the symptoms, he developed chest tightness which was substernal, nonradiating.  Symptoms continue to intensify over the next 2 to 3 hours.  Little after 6 PM, he decided to present to the hospital.  Arrival EKG showed anterior ST elevations concerning for acute STEMI.  He was taken emergently to Cath Lab and underwent long, complex PCI to proximal to mid LAD with placement of a drug-eluting stent.  Throughout the case, he continued to have chest pain which had improved significantly at the end of the case after stenting.  He also complained of severe shortness of breath and orthopnea which persisted at the end of the case.  His filling pressures were noted to be significantly elevated and suggested volume overload.  There was also evidence of severe pulmonary edema on admission chest x-ray.  He received 40 mg IV Lasix in the Cath Lab with improvement in LVEDP from 27 mmHg to 22 mmHg.  Currently, he denies any chest pain but remains very short of breath.  He is on BiPAP for respiratory support and able to maintain oxygen saturation in the high 90s.  Of note, in the beginning of the case, he developed A-fib with RVR and despite IV metoprolol, his heart rate remains in the 110 to 120 bpm range.      PAST MEDICAL HISTORY   has a past medical history of COVID, Diabetes mellitus (HCC), Gastroesophageal reflux disease without esophagitis, Hypertension, Pneumonia due to COVID-19 virus, and Rash.    SURGICAL HISTORY   has no past surgical history on file.     SOCIAL HISTORY   
63.6*       Patient with large proximal LAD occlusion sp stenting.  Had afib with RVR overnight.  Restart brillinta  Oxygen requirements gradually improving with lasix drip.  Continue for now.  Switch meds to PO where we can.  Start diet.  ECHO pending.      Shawn Rios MD   
from his LifeVest, he will need an ICD prior to the 3-month period.  -Thank you for the consult.     Thank you for allowing me to participate in the care of this patient. If you have any further questions please feel free to contact me.      Tami Lyons DO, RS, FACC, FAHA  01/15/25  12:27 PM   [facility]   [phone] [facility]   [phone] Main central      ADDENDUM:    Patient is continuing to have nonsustained ventricular arrhythmias.  On my review of the echocardiogram the EF is probably closer to 30%.  Will plan on the LifeVest prior to discharge as a bridge to decision regarding ICD.    Tami Lyons DO

## 2025-01-21 NOTE — DISCHARGE SUMMARY
Physician Discharge Summary    Patient ID:  Jan Devine  [id number]  69 y.o.  1955    Admit date: 1/12/2025  Discharge date and time: 1/21/2025    Admitting Physician: Hero Silva MD     Admission Diagnoses: STEMI (ST elevation myocardial infarction) ([facility]) [I21.3]  ST elevation myocardial infarction (STEMI), unspecified artery ([facility]) [I21.3]  STEMI involving left anterior descending coronary artery ([facility]) [I21.02]  Discharge Diagnoses:    Patient Active Problem List   Diagnosis Code    Essential hypertension I10    Type 2 diabetes mellitus with hyperglycemia, without long-term current use of insulin ([facility]) E11.65    Gastroesophageal reflux disease without esophagitis K21.9    Severe obesity (BMI 35.0-39.9) with comorbidity E66.01    STEMI (ST elevation myocardial infarction) ([facility]) I21.3    STEMI involving left anterior descending coronary artery ([facility]) I21.02    Acute respiratory failure with hypoxia J96.01    Acute pulmonary edema J81.0    New onset atrial fibrillation ([facility]) I48.91    Controlled type 2 diabetes mellitus without complication, without long-term current use of insulin ([facility]) E11.9    Ventricular fibrillation I49.01    NSVT (nonsustained ventricular tachycardia) ([facility]) I47.29    Polymorphic ventricular tachycardia ([facility]) I47.29       Consults: Pulm/CC  Hospital Course:    Acute anterolateral STEMI: IVUS guided [id number] LAD, right radial, Dr. Silva 1/12/2025, mild residual RCA/LCX disease  Acute hypoxic respiratory failure, S/P mechanical ventilation  Acute systolic HF (LVEF 35%), NYHA Class IV on arrival   Ischemic cardiomyopathy   Polymorphic VT/VF S/P repeat defibrillation POD 2 felt to be 2/2 critical hypokalemia   PAF, new onset in cath lab, currently in SR   Prolonged QTc, resolved   Hypertension  Hyperlipidemia  Diabetes mellitus  Obesity   KATINA     Aspirin 81 mg long term.  P2Y12i (Brilinta 90 mg bid) for at least one year.  No OAC at this time with PAF isolated to intraprocedure.

## 2025-01-21 NOTE — CARE COORDINATION
Case Management Assessment            Discharge Note                    Date / Time of Note: 1/21/2025 10:52 AM                  Discharge Note Completed by: CHLESIE Noel   for [facility] ([facility])  [place] Mobile: [phone]    Patient Name: Jan Devine   YOB: 1955  Diagnosis: STEMI (ST elevation myocardial infarction) (HCC) [I21.3]  ST elevation myocardial infarction (STEMI), unspecified artery (HCC) [I21.3]  STEMI involving left anterior descending coronary artery (HCC) [I21.02]   Date / Time: 1/12/2025  6:22 PM    Current PCP: Ashley Lagunas MD  Clinic patient: No    Hospitalization in the last 30 days: No       Advance Directives:  Code Status: Full Code  [place] DNR form completed and on chart: Not Indicated    Financial:  Payor: MEDICARE / Plan: MEDICARE PART A AND B / Product Type: *No Product type* /      Pharmacy:    [facility] [id number] - [place], [place] - [address] - P [phone] - F [phone]  [address] [facility] [address]  Phone: [phone] Fax: [phone]      Assistance purchasing medications?: Potential Assistance Purchasing Medications: No  Assistance provided by Case Management: None at this time    Does patient want to participate in local refill/ meds to beds program?: Yes    Meds To Beds General Rules:  1. Can ONLY be done Monday- Friday between 8:30am-5pm  2. Prescription(s) must be in pharmacy by 3pm to be filled same day  3.Copy of patient's insurance/ prescription drug card and patient face sheet must be sent along with the prescription(s)  4. Cost of Rx cannot be added to hospital bill. If financial assistance is needed, please contact unit  or ;  or  CANNOT provide pharmacy voucher for patients co-pays  5. Patients can then  the prescription on their way out of the hospital at discharge, or pharmacy can deliver to the bedside if staff

## 2025-01-21 NOTE — PLAN OF CARE
Problem: Chronic Conditions and Co-morbidities  Goal: Patient's chronic conditions and co-morbidity symptoms are monitored and maintained or improved  1/13/2025 1051 by Raquel Hussein RN  Outcome: Progressing  Flowsheets (Taken 1/13/2025 0800)  Care Plan - Patient's Chronic Conditions and Co-Morbidity Symptoms are Monitored and Maintained or Improved:   Monitor and assess patient's chronic conditions and comorbid symptoms for stability, deterioration, or improvement   Update acute care plan with appropriate goals if chronic or comorbid symptoms are exacerbated and prevent overall improvement and discharge   Collaborate with multidisciplinary team to address chronic and comorbid conditions and prevent exacerbation or deterioration     Problem: Discharge Planning  Goal: Discharge to home or other facility with appropriate resources  1/13/2025 1051 by Raquel Hussein, RN  Outcome: Progressing  Flowsheets (Taken 1/13/2025 0800)  Discharge to home or other facility with appropriate resources:   Identify barriers to discharge with patient and caregiver   Arrange for needed discharge resources and transportation as appropriate   Identify discharge learning needs (meds, wound care, etc)   Refer to discharge planning if patient needs post-hospital services based on physician order or complex needs related to functional status, cognitive ability or social support system     Problem: Safety - Adult  Goal: Free from fall injury  1/13/2025 1051 by Raquel Hussein, RN  Outcome: Progressing  Flowsheets (Taken 1/13/2025 0800)  Free From Fall Injury: Instruct family/caregiver on patient safety     Problem: Skin/Tissue Integrity  Goal: Absence of new skin breakdown  Description: 1.  Monitor for areas of redness and/or skin breakdown  2.  Assess vascular access sites hourly  3.  Every 4-6 hours minimum:  Change oxygen saturation probe site  4.  Every 4-6 hours:  If on nasal continuous positive airway pressure, respiratory therapy assess 
  Problem: Chronic Conditions and Co-morbidities  Goal: Patient's chronic conditions and co-morbidity symptoms are monitored and maintained or improved  1/14/2025 0001 by Rome Frank RN  Outcome: Progressing     Problem: Discharge Planning  Goal: Discharge to home or other facility with appropriate resources  1/14/2025 0001 by Rome Frank RN  Outcome: Progressing     Problem: Safety - Adult  Goal: Free from fall injury  1/14/2025 0001 by Rome Frank RN  Outcome: Progressing     Problem: Skin/Tissue Integrity  Goal: Absence of new skin breakdown  Description: 1.  Monitor for areas of redness and/or skin breakdown  2.  Assess vascular access sites hourly  3.  Every 4-6 hours minimum:  Change oxygen saturation probe site  4.  Every 4-6 hours:  If on nasal continuous positive airway pressure, respiratory therapy assess nares and determine need for appliance change or resting period.  1/14/2025 0001 by Rome Frank, RN  Outcome: Progressing     Problem: Pain  Goal: Verbalizes/displays adequate comfort level or baseline comfort level  1/14/2025 0001 by Rome Frank, RN  Outcome: Progressing     
  Problem: Chronic Conditions and Co-morbidities  Goal: Patient's chronic conditions and co-morbidity symptoms are monitored and maintained or improved  1/14/2025 1241 by Prabha Nath RN  Outcome: Progressing  1/14/2025 0001 by Rome Frank RN  Outcome: Progressing     Problem: Discharge Planning  Goal: Discharge to home or other facility with appropriate resources  1/14/2025 1241 by Prabha Nath RN  Outcome: Progressing  1/14/2025 0001 by Rome Frank RN  Outcome: Progressing     Problem: Safety - Adult  Goal: Free from fall injury  1/14/2025 1241 by Prabha Nath RN  Outcome: Progressing  Flowsheets (Taken 1/14/2025 1236)  Free From Fall Injury: Instruct family/caregiver on patient safety  1/14/2025 0001 by Rome Frank RN  Outcome: Progressing     Problem: Skin/Tissue Integrity  Goal: Absence of new skin breakdown  Description: 1.  Monitor for areas of redness and/or skin breakdown  2.  Assess vascular access sites hourly  3.  Every 4-6 hours minimum:  Change oxygen saturation probe site  4.  Every 4-6 hours:  If on nasal continuous positive airway pressure, respiratory therapy assess nares and determine need for appliance change or resting period.  1/14/2025 1241 by Prabha Nath RN  Outcome: Progressing  1/14/2025 0001 by Rmoe Frank RN  Outcome: Progressing     Problem: Pain  Goal: Verbalizes/displays adequate comfort level or baseline comfort level  1/14/2025 1241 by Prabha Nath RN  Outcome: Progressing  Flowsheets (Taken 1/14/2025 1200)  Verbalizes/displays adequate comfort level or baseline comfort level:   Encourage patient to monitor pain and request assistance   Assess pain using appropriate pain scale   Administer analgesics based on type and severity of pain and evaluate response   Implement non-pharmacological measures as appropriate and evaluate response   Consider cultural and social influences on pain and pain management   Notify Licensed Independent Practitioner if 
  Problem: Chronic Conditions and Co-morbidities  Goal: Patient's chronic conditions and co-morbidity symptoms are monitored and maintained or improved  1/14/2025 1956 by Yoandy Ramos RN  Outcome: Progressing     Problem: Discharge Planning  Goal: Discharge to home or other facility with appropriate resources  1/14/2025 1956 by Yoandy Ramos RN  Outcome: Progressing     Problem: Safety - Adult  Goal: Free from fall injury  1/14/2025 1956 by Yoandy Ramos RN  Outcome: Progressing     Problem: Skin/Tissue Integrity  Goal: Absence of new skin breakdown  Description: 1.  Monitor for areas of redness and/or skin breakdown  2.  Assess vascular access sites hourly  3.  Every 4-6 hours minimum:  Change oxygen saturation probe site  4.  Every 4-6 hours:  If on nasal continuous positive airway pressure, respiratory therapy assess nares and determine need for appliance change or resting period.  1/14/2025 1956 by Yoandy Ramos RN  Outcome: Progressing     Problem: Pain  Goal: Verbalizes/displays adequate comfort level or baseline comfort level  1/14/2025 1956 by Yoandy Ramos RN  Outcome: Progressing     Problem: Safety - Medical Restraint  Goal: Remains free of injury from restraints (Restraint for Interference with Medical Device)  Description: INTERVENTIONS:  1. Determine that other, less restrictive measures have been tried or would not be effective before applying the restraint  2. Evaluate the patient's condition at the time of restraint application  3. Inform patient/family regarding the reason for restraint  4. Q2H: Monitor safety, psychosocial status, comfort, nutrition and hydration  1/14/2025 1956 by Yoandy Ramos RN  Outcome: Progressing    Problem: ABCDS Injury Assessment  Goal: Absence of physical injury  Outcome: Progressing     
  Problem: Chronic Conditions and Co-morbidities  Goal: Patient's chronic conditions and co-morbidity symptoms are monitored and maintained or improved  1/16/2025 0844 by Lu Marie RN  Outcome: Progressing     Problem: Discharge Planning  Goal: Discharge to home or other facility with appropriate resources  1/16/2025 0844 by Lu Marie RN  Outcome: Progressing     Problem: Safety - Adult  Goal: Free from fall injury  1/16/2025 0844 by Lu Marie RN  Outcome: Progressing     Problem: Skin/Tissue Integrity  Goal: Absence of new skin breakdown  Description: 1.  Monitor for areas of redness and/or skin breakdown  2.  Assess vascular access sites hourly  3.  Every 4-6 hours minimum:  Change oxygen saturation probe site  4.  Every 4-6 hours:  If on nasal continuous positive airway pressure, respiratory therapy assess nares and determine need for appliance change or resting period.  1/16/2025 0844 by Lu Marie RN  Outcome: Progressing     Problem: Pain  Goal: Verbalizes/displays adequate comfort level or baseline comfort level  1/16/2025 0844 by Lu Marie RN  Outcome: Progressing     Problem: Safety - Medical Restraint  Goal: Remains free of injury from restraints (Restraint for Interference with Medical Device)  Description: INTERVENTIONS:  1. Determine that other, less restrictive measures have been tried or would not be effective before applying the restraint  2. Evaluate the patient's condition at the time of restraint application  3. Inform patient/family regarding the reason for restraint  4. Q2H: Monitor safety, psychosocial status, comfort, nutrition and hydration  1/16/2025 0844 by Lu Marie RN  Outcome: Progressing  Flowsheets  Taken 1/16/2025 0800 by Lu Marie RN  Remains free of injury from restraints (restraint for interference with medical device): Every 2 hours: Monitor safety, psychosocial status, comfort, nutrition and hydration  Taken 1/16/2025 0600 by Richard 
  Problem: Chronic Conditions and Co-morbidities  Goal: Patient's chronic conditions and co-morbidity symptoms are monitored and maintained or improved  1/16/2025 1929 by Yoandy Ramos RN  Outcome: Progressing     Problem: Discharge Planning  Goal: Discharge to home or other facility with appropriate resources  1/16/2025 1929 by Yoandy Ramos RN  Outcome: Progressing     Problem: Safety - Adult  Goal: Free from fall injury  1/16/2025 1929 by Yoandy Ramos RN  Outcome: Progressing     Problem: Skin/Tissue Integrity  Goal: Absence of new skin breakdown  Description: 1.  Monitor for areas of redness and/or skin breakdown  2.  Assess vascular access sites hourly  3.  Every 4-6 hours minimum:  Change oxygen saturation probe site  4.  Every 4-6 hours:  If on nasal continuous positive airway pressure, respiratory therapy assess nares and determine need for appliance change or resting period.  1/16/2025 1929 by Yoandy Ramos RN  Outcome: Progressing     Problem: Pain  Goal: Verbalizes/displays adequate comfort level or baseline comfort level  1/16/2025 1929 by Yoandy Ramos RN  Outcome: Progressing     Problem: ABCDS Injury Assessment  Goal: Absence of physical injury  1/16/2025 1929 by Yoandy Ramos RN  Outcome: Progressing     Problem: Nutrition Deficit:  Goal: Optimize nutritional status  1/16/2025 1929 by Yoandy Ramos, RN  Outcome: Progressing     
  Problem: Chronic Conditions and Co-morbidities  Goal: Patient's chronic conditions and co-morbidity symptoms are monitored and maintained or improved  1/19/2025 0847 by Radha Echevarria RN  Outcome: Progressing  Flowsheets (Taken 1/19/2025 0800)  Care Plan - Patient's Chronic Conditions and Co-Morbidity Symptoms are Monitored and Maintained or Improved:   Monitor and assess patient's chronic conditions and comorbid symptoms for stability, deterioration, or improvement   Collaborate with multidisciplinary team to address chronic and comorbid conditions and prevent exacerbation or deterioration   Update acute care plan with appropriate goals if chronic or comorbid symptoms are exacerbated and prevent overall improvement and discharge  1/18/2025 2308 by Rome Frank, RN  Outcome: Progressing     Problem: Discharge Planning  Goal: Discharge to home or other facility with appropriate resources  1/19/2025 0847 by Radha Echevarria RN  Outcome: Progressing  Flowsheets (Taken 1/19/2025 0800)  Discharge to home or other facility with appropriate resources:   Identify barriers to discharge with patient and caregiver   Arrange for needed discharge resources and transportation as appropriate   Identify discharge learning needs (meds, wound care, etc)   Refer to discharge planning if patient needs post-hospital services based on physician order or complex needs related to functional status, cognitive ability or social support system  1/18/2025 2308 by Rome Frank, RN  Outcome: Progressing     Problem: Safety - Adult  Goal: Free from fall injury  1/19/2025 0847 by Radha Echevarria RN  Outcome: Progressing  Flowsheets (Taken 1/19/2025 0800)  Free From Fall Injury:   Instruct family/caregiver on patient safety   Based on caregiver fall risk screen, instruct family/caregiver to ask for assistance with transferring infant if caregiver noted to have fall risk factors  1/18/2025 2308 by Rome Frank, RN  Outcome: Progressing     
  Problem: Chronic Conditions and Co-morbidities  Goal: Patient's chronic conditions and co-morbidity symptoms are monitored and maintained or improved  Outcome: Progressing     Problem: Discharge Planning  Goal: Discharge to home or other facility with appropriate resources  Outcome: Progressing     Problem: Safety - Adult  Goal: Free from fall injury  Outcome: Progressing     Problem: Skin/Tissue Integrity  Goal: Absence of new skin breakdown  Description: 1.  Monitor for areas of redness and/or skin breakdown  2.  Assess vascular access sites hourly  3.  Every 4-6 hours minimum:  Change oxygen saturation probe site  4.  Every 4-6 hours:  If on nasal continuous positive airway pressure, respiratory therapy assess nares and determine need for appliance change or resting period.  Outcome: Progressing     Problem: Pain  Goal: Verbalizes/displays adequate comfort level or baseline comfort level  1/17/2025 2211 by Rome Frank RN  Outcome: Progressing  1/17/2025 1629 by Selene Marina RN  Outcome: Progressing     Problem: ABCDS Injury Assessment  Goal: Absence of physical injury  Outcome: Progressing     Problem: Nutrition Deficit:  Goal: Optimize nutritional status  Outcome: Progressing  Flowsheets (Taken 1/17/2025 1132 by Daphne Lynn, SACHI)  Nutrient intake appropriate for improving, restoring, or maintaining nutritional needs: Assess nutritional status and recommend course of action     Problem: SLP Adult - Impaired Swallowing  Goal: By Discharge: Advance to least restrictive diet without signs or symptoms of aspiration for planned discharge setting.  See evaluation for individualized goals.  1/17/2025 1111 by Kristin Coy, SLP  Outcome: Progressing     Problem: Neurosensory - Adult  Goal: Achieves stable or improved neurological status  Outcome: Progressing     Problem: Respiratory - Adult  Goal: Achieves optimal ventilation and oxygenation  1/17/2025 2211 by Rome Frank, RN  Outcome: 
  Problem: Chronic Conditions and Co-morbidities  Goal: Patient's chronic conditions and co-morbidity symptoms are monitored and maintained or improved  Outcome: Progressing     Problem: Discharge Planning  Goal: Discharge to home or other facility with appropriate resources  Outcome: Progressing     Problem: Safety - Adult  Goal: Free from fall injury  Outcome: Progressing     Problem: Skin/Tissue Integrity  Goal: Absence of new skin breakdown  Description: 1.  Monitor for areas of redness and/or skin breakdown  2.  Assess vascular access sites hourly  3.  Every 4-6 hours minimum:  Change oxygen saturation probe site  4.  Every 4-6 hours:  If on nasal continuous positive airway pressure, respiratory therapy assess nares and determine need for appliance change or resting period.  Outcome: Progressing     Problem: Pain  Goal: Verbalizes/displays adequate comfort level or baseline comfort level  Outcome: Progressing     
  Problem: Chronic Conditions and Co-morbidities  Goal: Patient's chronic conditions and co-morbidity symptoms are monitored and maintained or improved  Outcome: Progressing     Problem: Discharge Planning  Goal: Discharge to home or other facility with appropriate resources  Outcome: Progressing     Problem: Safety - Adult  Goal: Free from fall injury  Outcome: Progressing     Problem: Skin/Tissue Integrity  Goal: Absence of new skin breakdown  Description: 1.  Monitor for areas of redness and/or skin breakdown  2.  Assess vascular access sites hourly  3.  Every 4-6 hours minimum:  Change oxygen saturation probe site  4.  Every 4-6 hours:  If on nasal continuous positive airway pressure, respiratory therapy assess nares and determine need for appliance change or resting period.  Outcome: Progressing     Problem: Pain  Goal: Verbalizes/displays adequate comfort level or baseline comfort level  Outcome: Progressing     Problem: ABCDS Injury Assessment  Goal: Absence of physical injury  Outcome: Progressing     Problem: Nutrition Deficit:  Goal: Optimize nutritional status  Outcome: Progressing     Problem: Neurosensory - Adult  Goal: Achieves stable or improved neurological status  Outcome: Progressing     Problem: Respiratory - Adult  Goal: Achieves optimal ventilation and oxygenation  1/18/2025 2308 by Rome Frank, RN  Outcome: Progressing  1/18/2025 1120 by Selene Marina, RN  Outcome: Progressing  Flowsheets  Taken 1/18/2025 1120  Achieves optimal ventilation and oxygenation:   Assess for changes in respiratory status   Position to facilitate oxygenation and minimize respiratory effort   Encourage broncho-pulmonary hygiene including cough, deep breathe, incentive spirometry  Taken 1/18/2025 0800  Achieves optimal ventilation and oxygenation: Assess for changes in respiratory status     Problem: Cardiovascular - Adult  Goal: Maintains optimal cardiac output and hemodynamic stability  1/18/2025 2308 by Zac 
  Problem: Chronic Conditions and Co-morbidities  Goal: Patient's chronic conditions and co-morbidity symptoms are monitored and maintained or improved  Outcome: Progressing     Problem: Discharge Planning  Goal: Discharge to home or other facility with appropriate resources  Outcome: Progressing     Problem: Safety - Adult  Goal: Free from fall injury  Outcome: Progressing     Problem: Skin/Tissue Integrity  Goal: Absence of new skin breakdown  Description: 1.  Monitor for areas of redness and/or skin breakdown  2.  Assess vascular access sites hourly  3.  Every 4-6 hours minimum:  Change oxygen saturation probe site  4.  Every 4-6 hours:  If on nasal continuous positive airway pressure, respiratory therapy assess nares and determine need for appliance change or resting period.  Outcome: Progressing     Problem: Pain  Goal: Verbalizes/displays adequate comfort level or baseline comfort level  Outcome: Progressing     Problem: Safety - Medical Restraint  Goal: Remains free of injury from restraints (Restraint for Interference with Medical Device)  Description: INTERVENTIONS:  1. Determine that other, less restrictive measures have been tried or would not be effective before applying the restraint  2. Evaluate the patient's condition at the time of restraint application  3. Inform patient/family regarding the reason for restraint  4. Q2H: Monitor safety, psychosocial status, comfort, nutrition and hydration  Outcome: Progressing  Flowsheets  Taken 1/15/2025 2200 by Yoandy Ramos RN  Remains free of injury from restraints (restraint for interference with medical device): Every 2 hours: Monitor safety, psychosocial status, comfort, nutrition and hydration    Problem: ABCDS Injury Assessment  Goal: Absence of physical injury  Outcome: Progressing     
  Problem: Pain  Goal: Verbalizes/displays adequate comfort level or baseline comfort level  Outcome: Progressing     Problem: SLP Adult - Impaired Swallowing  Goal: By Discharge: Advance to least restrictive diet without signs or symptoms of aspiration for planned discharge setting.  See evaluation for individualized goals.  1/17/2025 1111 by Kristin Coy, SLP  Outcome: Progressing     Problem: Respiratory - Adult  Goal: Achieves optimal ventilation and oxygenation  Outcome: Progressing     Problem: Coping  Goal: Pt/Family able to verbalize concerns and demonstrate effective coping strategies  Description: INTERVENTIONS:  1. Assist patient/family to identify coping skills, available support systems and cultural and spiritual values  2. Provide emotional support, including active listening and acknowledgement of concerns of patient and caregivers  3. Reduce environmental stimuli, as able  4. Instruct patient/family in relaxation techniques, as appropriate  5. Assess for spiritual pain/suffering and initiate Spiritual Care, Psychosocial Clinical Specialist consults as needed  Outcome: Progressing     Problem: Anxiety  Goal: Will report anxiety at manageable levels  Description: INTERVENTIONS:  1. Administer medication as ordered  2. Teach and rehearse alternative coping skills  3. Provide emotional support with 1:1 interaction with staff  Outcome: Not Progressing     
  Problem: SLP Adult - Impaired Swallowing  Goal: By Discharge: Advance to least restrictive diet without signs or symptoms of aspiration for planned discharge setting.  See evaluation for individualized goals.  Outcome: Progressing     
  Problem: Safety - Adult  Goal: Free from fall injury  1/21/2025 0236 by Ariane Chand RN  Flowsheets (Taken 1/21/2025 0236)  Free From Fall Injury:   Instruct family/caregiver on patient safety   Based on caregiver fall risk screen, instruct family/caregiver to ask for assistance with transferring infant if caregiver noted to have fall risk factors  Note: Orthostatic vital signs obtained at start of shift - see flowsheet for details.  Pt meets criteria for orthostasis.  Pt is a Low fall risk. See Rueda Fall Score and ABCDS Injury Risk assessments.     - Screening for Orthostasis AND not a [place] Risk per RUEDA/ABCDS: Pt bed is in low position, side rails up, call light and belongings are in reach.  Fall risk light is on outside pts room.  Pt encouraged to call for assistance as needed. Will continue with hourly rounds for PO intake, pain needs, toileting and repositioning as needed.      Problem: Pain  Goal: Verbalizes/displays adequate comfort level or baseline comfort level  1/21/2025 0236 by Ariane Chand RN  Flowsheets (Taken 1/21/2025 0236)  Verbalizes/displays adequate comfort level or baseline comfort level:   Encourage patient to monitor pain and request assistance   Administer analgesics based on type and severity of pain and evaluate response  Note: Pt with no complaints of pain this shift.     Problem: Metabolic/Fluid and Electrolytes - Adult  Goal: Electrolytes maintained within normal limits  Flowsheets (Taken 1/21/2025 0236)  Electrolytes maintained within normal limits:   Monitor labs and assess patient for signs and symptoms of electrolyte imbalances   Monitor response to electrolyte replacements, including repeat lab results as appropriate  Note:   Lab Results   Component Value Date     01/20/2025    K 3.6 01/20/2025     01/20/2025    CO2 24 01/20/2025      
Problem: Chronic Conditions and Co-morbidities  Goal: Patient's chronic conditions and co-morbidity symptoms are monitored and maintained or improved  1/20/2025 1541 by Mitzy Prieto RN  Outcome: Progressing  Pt ACHS blood sugar checks, no insulin required this shift.    Problem: Discharge Planning  Goal: Discharge to home or other facility with appropriate resources  Outcome: Progressing  Pt to discharge home likely tomorrow. Pt fitted for life vest today.     Problem: Safety - Adult  Goal: Free from fall injury  1/20/2025 1541 by Mitzy Prieto, RN  Outcome: Progressing  Pt up ad jillian, ortho negative, call light within reach     Problem: Skin/Tissue Integrity  Goal: Absence of new skin breakdown  Description: 1.  Monitor for areas of redness and/or skin breakdown  2.  Assess vascular access sites hourly  3.  Every 4-6 hours minimum:  Change oxygen saturation probe site  4.  Every 4-6 hours:  If on nasal continuous positive airway pressure, respiratory therapy assess nares and determine need for appliance change or resting period.  Outcome: Progressing  No new skin breakdown this shift.     Problem: Pain  Goal: Verbalizes/displays adequate comfort level or baseline comfort level  1/20/2025 1541 by Mitzy Prieto, RN  Outcome: Progressing  No complaints of pain this shift.     Problem: ABCDS Injury Assessment  Goal: Absence of physical injury  Outcome: Progressing  Pt up ad jillian, ortho neg, call light within reach     Problem: Nutrition Deficit:  Goal: Optimize nutritional status  Outcome: Progressing  Pt up out of bed for all meals this shift successfully finishing 75% or greater at each meal.     Problem: Respiratory - Adult  Goal: Achieves optimal ventilation and oxygenation  Outcome: Progressing  Pt remains on room air this shift     Problem: Cardiovascular - Adult  Goal: Maintains optimal cardiac output and hemodynamic stability  Outcome: Progressing  Pt remains on tele, NSR. Pt fitted for life vest this 
pain scale     Problem: Metabolic/Fluid and Electrolytes - Adult  Goal: Electrolytes maintained within normal limits  Flowsheets (Taken 1/20/2025 0142)  Electrolytes maintained within normal limits:   Monitor labs and assess patient for signs and symptoms of electrolyte imbalances   Monitor response to electrolyte replacements, including repeat lab results as appropriate   Instruct patient on fluid and nutrition restrictions as appropriate  Note:   Lab Results   Component Value Date     01/19/2025    K 3.3 (L) 01/19/2025     01/19/2025    CO2 24 01/19/2025      
and activity tolerance for standing, transferring and ambulating with or without assistive devices  1/17/2025 2211 by Rome Frank RN  Outcome: Progressing     Problem: Gastrointestinal - Adult  Goal: Minimal or absence of nausea and vomiting  1/18/2025 1120 by Selene Marina RN  Outcome: Progressing  Flowsheets (Taken 1/18/2025 0800)  Minimal or absence of nausea and vomiting:   Administer IV fluids as ordered to ensure adequate hydration   Advance diet as tolerated, if ordered  1/17/2025 2211 by Rome Frank RN  Outcome: Progressing     Problem: Genitourinary - Adult  Goal: Absence of urinary retention  1/18/2025 1120 by Selene Marina RN  Outcome: Progressing  Flowsheets (Taken 1/18/2025 0800)  Absence of urinary retention: Assess patient’s ability to void and empty bladder  1/17/2025 2211 by Rome Frank RN  Outcome: Progressing     Problem: Anxiety  Goal: Will report anxiety at manageable levels  Description: INTERVENTIONS:  1. Administer medication as ordered  2. Teach and rehearse alternative coping skills  3. Provide emotional support with 1:1 interaction with staff  1/18/2025 1120 by Selene Marina RN  Outcome: Progressing  Flowsheets (Taken 1/18/2025 0800)  Will report anxiety at manageable levels: Administer medication as ordered  1/17/2025 2211 by Rome Frank RN  Outcome: Progressing     Problem: Coping  Goal: Pt/Family able to verbalize concerns and demonstrate effective coping strategies  Description: INTERVENTIONS:  1. Assist patient/family to identify coping skills, available support systems and cultural and spiritual values  2. Provide emotional support, including active listening and acknowledgement of concerns of patient and caregivers  3. Reduce environmental stimuli, as able  4. Instruct patient/family in relaxation techniques, as appropriate  5. Assess for spiritual pain/suffering and initiate Spiritual Care, Psychosocial Clinical Specialist consults as needed  1/18/2025 1120 by

## 2025-01-21 NOTE — PROGRESS NOTES
Speech Language Pathology  Facility/Department:[facility] ICU  Bedside Swallow Evaluation                                                       Name: Jan Devine  : 1955  MRN: [id number]    Patient Diagnosis(es):   Patient Active Problem List    Diagnosis Date Noted    STEMI (ST elevation myocardial infarction) ([facility]) 2025    NSVT (nonsustained ventricular tachycardia) ([facility]) 01/15/2025    Polymorphic ventricular tachycardia ([facility]) 01/15/2025    Ventricular fibrillation 2025    Acute respiratory failure with hypoxia 2025    Acute pulmonary edema 2025    New onset atrial fibrillation ([facility]) 2025    Controlled type 2 diabetes mellitus without complication, without long-term current use of insulin ([facility]) 2025    STEMI involving left anterior descending coronary artery ([facility]) 2025    Severe obesity (BMI 35.0-39.9) with comorbidity 2023    Gastroesophageal reflux disease without esophagitis     Essential hypertension 2021    Type 2 diabetes mellitus with hyperglycemia, without long-term current use of insulin ([facility]) 2021       Past Medical History:   Diagnosis Date    COVID 2022    Diabetes mellitus (HCC)     Gastroesophageal reflux disease without esophagitis     Hypertension     Pneumonia due to COVID-19 virus     Rash      Past Surgical History:   Procedure Laterality Date    CARDIAC PROCEDURE N/A 2025    Left heart cath / coronary angiography performed by Hero Silva MD at [facility] CARDIAC CATH LAB    CARDIAC PROCEDURE N/A 2025    Percutaneous coronary intervention performed by Hero Silva MD at [facility] CARDIAC CATH LAB    CARDIAC PROCEDURE N/A 2025    Intravascular ultrasound performed by Hero Silva MD at [facility] CARDIAC CATH LAB       Reason for Referral:  Jan Devine  was referred for a Speech Therapy evaluation to assess swallow function.    History of Present Illness  Per admitting H&P: 25  '69 y.o. male with a 
     ICU Progress Note    Admit Date: 1/12/2025  Hospital Day:  Hospital Day: 3  ICU Day:    CC: STEMI      Interval History     Mr. Jan Devine is a 68yo male with PMH significant for DM2, HTN, HLD who presented to the ED on 1/13/25 with CP, n/v, diaphoresis, SOB. Found to have lateral/anterior septal STEMI secondary to complete occlusion of proximal LAD. Now s/p thrombectomy and HECTOR. 1 episode of afib in cath lab with stable pressures. EF 35-40%, LVEDP 27. Started on integrillin, 40 IV lasix, heparin gtts. Started on BiPAP for spO2 87% on nonrebreather, deescalated to 15L HFNC.     Overnight:  - RFP with K 3.0 ovn, received K repletion  - Early 1/14 AM Vfib x3. Successfully defibrillated. Given 1mg epi, started on amiodarone drip  - No new ischemic changes on EKG, QtC 539, NSR. Lactate 4.5, K 2.4 hemolyzed, Mg wnl. Lasix gtt held.  - R Fem CVC placed for K repletion  - Successfully intubated with versed, propofol, etomidate, 1 episode of emesis post-intubation  - Vitals: AF, HR 61, BP 80s/60s (MAP 70s), SpO2 100%, weight 130kg (dry weight? ~115kg)  - I/O: 1875 mL urine, net -759 mL  - Labs: Na 136, K 4.1 hemolyzed (from 2.4/3), Mg 1.99, Cr 1.6 (from 1.4), BUN 29, WBC 32.3 (from 27.0), ABG 7.467/45.4/177.2/32.6, lactate 2.4  - Imaging: CXR with patchy opacities in LLL, ETT well positioned  - Gtts: propofol, fentanyl, levo, amiodarone, lasix (has not been receiving since 1/14), lidocaine  - Vent: rate 16, Vt 550, PEEP 5, FiO2 40%  - Lines: PIV x2, R fem CVC    ROS:   Review of Systems   Unable to perform ROS: Intubated (Patient intubated and sedated)     Objective     MEDICATIONS:  Scheduled Meds:     insulin lispro  0-16 Units SubCUTAneous 4x Daily AC & HS    polyethylene glycol  17 g Oral Daily    magnesium sulfate  2,000 mg IntraVENous Once    famotidine (PEPCID) injection  20 mg IntraVENous BID    metoprolol tartrate  25 mg Oral BID    sodium chloride flush  5-40 mL IntraVENous 2 times per day    aspirin  81 mg 
     ICU Progress Note    Admit Date: 1/12/2025  Hospital Day:  Hospital Day: 5  ICU Day: 5    CC: STEMI      Interval History     Mr. Jan Devine is a 70yo male with PMH significant for DM2, HTN, HLD who presented to the ED on 1/13/25 with CP, n/v, diaphoresis, SOB. Found to have lateral/anterior septal STEMI secondary to complete occlusion of proximal LAD. Now s/p thrombectomy and HECTOR. 1 episode of afib in cath lab with stable pressures. EF 35-40%, LVEDP 27. Started on integrillin, 40 IV lasix, heparin gtts. Briefly on BiPAP and HFNC for hypoxia to 87%.    After multiple runs of Vfib/arrest on 1/14, patient intubated and on low dose pressor support with levo. Heparin and lasix gtt discontinued and started on amiodarone gtt. Likely related to hypokalemia (low 2.4), continuing to monitor and replete electrolytes.    Overnight:  - SBT started 1620. Briefly hypertensive to 150s after dc fentanyl, improved with tylenol. Passed SBT but will hold off on extubation as wife will bring home BiPAP.   - Several beats of non-sustained VT noted on tele. Otherwise no acute changes.  - Overnight K 4.4 > 4.2   - Vitals: Tmax 100.6, HR , -111, weight 132 kg (from 131.5 kg)   - I/O: 1.2 L urine, net +871 mL  - Labs: Na 142, K 4.4 (hemolyzed, prev 4.2), Mg 2.41, Phos 2.4 (from 1.6), Cr 1.1 (from 1.3), BUN 36, WBC 17.5 (from 21.8)  - Imaging: No new images  - Gtts: propofol 15, precedex 0.4, levo 5, amiodarone 30  - Vent: Vent Settings: Vent Mode: AC/PRVC Resp Rate (Set): 16 bpm/Vt (Set, mL): 550 mL/ /FiO2 : 40 % / PEEP/CPAP (cmH2O): 5 / SpO2: 95 %   - ABG today: 7.42/42.3/87.3  - Lines: PIV x2, R fem CVC    ROS:   Review of Systems   Unable to perform ROS: Intubated (Patient intubated and sedated)     Objective     MEDICATIONS:  Scheduled Meds:     senna  1 tablet Oral Nightly    polyethylene glycol  17 g Oral BID    phosphorus  500 mg Oral BID    enoxaparin  30 mg SubCUTAneous BID    metoprolol tartrate  12.5 mg Oral 
   01/15/25 0900   Encounter Summary   Encounter Overview/Reason Follow-up   Service Provided For Family   Referral/Consult From [facility]   Support System Spouse;Children;Confucianism/danyelle community   Last Encounter  01/15/25  (GRD)   Complexity of Encounter Low   Begin Time 0845   End Time  0855   Total Time Calculated 10 min   Spiritual/Emotional needs   Type Spiritual Support   Assessment/Intervention/Outcome   Assessment Calm;Hopeful;Coping   Intervention Active listening   Outcome Coping;Optimistic     Spiritual Health History and Assessment/Progress Note  [facility]    (P) Follow-up, Follow up, Family Care, Adjustment to illness,      Name: Jan Devine MRN: [id number]    Age: 69 y.o.     Sex: male   Language: English   Episcopal: Unknown   STEMI (ST elevation myocardial infarction) ([facility])     Date: 1/15/2025            Total Time Calculated: (P) 10 min              Spiritual Assessment continued in [facility] ICU        Referral/Consult From: (P) Rounding   Encounter Overview/Reason: (P) Follow-up  Service Provided For: (P) Family    Danyelle, Belief, Meaning:   Patient unable to assess at this time  Family/Friends are connected with a danyelle tradition or spiritual practice and have beliefs or practices that help with coping during difficult times      Importance and Influence:  Patient unable to assess at this time  Family/Friends have spiritual/personal beliefs that influence decisions regarding the patient's health    Community:  Patient is connected with a spiritual community  Family/Friends are connected with a spiritual community: and feel well-supported. Support system includes: Spouse/Partner, Children, and Danyelle Community    Assessment and Plan of Care:     Patient Interventions include: Other: Family Care  Family/Friends Interventions include: Facilitated expression of thoughts and feelings    Patient Plan of Care: Spiritual Care available upon further referral  Family/Friends Plan of Care: 
  Cardiovascular Progress Note      Chief Complaint:   Chief Complaint   Patient presents with    Chest Pain     Impression/Recommendations:    Mr. Jan Devine is a 70 y/o male patient presented to ER 1/12/2025:    Acute anterolateral STEMI: IVUS guided [id number] LAD, right radial, Dr. Silva 1/12/2025, mild residual RCA/LCX disease  Acute hypoxic respiratory failure, S/P mechanical ventilation  Acute systolic HF (LVEF 35%), NYHA Class IV on arrival   Ischemic cardiomyopathy   Polymorphic VT/VF S/P repeat defibrillation POD 2 felt to be 2/2 critical hypokalemia   PAF, new onset in cath lab, currently in SR   Prolonged QTc, resolved   Hypertension  Hyperlipidemia  Diabetes mellitus  Obesity   KATINA    Aspirin 81 mg long term.  P2Y12i (Brilinta 90 mg bid) for at least one year.  No OAC at this time with PAF isolated to intraprocedure. Consider outpatient event monitor.   High intensity statin for discharge. May need to visit [facility]K9[facility] as outpatient.   Monomorphic NSVT. Convert beta blocker to Metoprolol succinate and Amiodarone to PO. LifeVest upon discharge. Follow up with EP.  Restart home ARB as hemodynamics allow. Change thiazide diuretic to MRA for LVEF <40%/potassium sparing benefit.   Pulmonary congestion by most recent XR/oxygen requirements.   Slow removal of mild fluid overload in light of hospital events.    Appreciate Pulmonary/critical care service.     Critical care time spent in direct management of this patient was >32 minutes, exclusive of separately documented procedures. Time spent includes but was not limited to directly managing the unstable patient, reviewing diagnostics, speaking with medical staff, and developing a treatment plan.    Interval History:  No overnight events.   No recurrent fevers.  No chest pain, shortness of breath. No edema.   Right femoral central line and [place] removed.   Tele: SR, isolated PVCs  3L oxygen.  He and his wife live in [place]. He reports having first onset 
  Comprehensive Nutrition Assessment    RECOMMENDATIONS:  PO Diet: Pending MBS  Nutrition Education: Education/Counseling not indicated     NUTRITION ASSESSMENT:   Nutritional summary & status: Pt now extubated. Currently NPO, plan for MBS later today per SLP. Will monitor diet advancement and determine if any further nutrition interventions are needed.   Admission // PMH: STEMI//Acute respiratory failure, DM, pulmonary edema, NSVT, polymorphic ventricular tachycardia, HTN    MALNUTRITION ASSESSMENT  Context of Malnutrition: Acute Illness   Malnutrition Status: At risk for malnutrition  Findings of the 6 clinical characteristics of malnutrition (Minimum of 2 out of 6 clinical characteristics is required to make the diagnosis of moderate or severe Protein Calorie Malnutrition based on AND/ASPEN Guidelines):  Energy Intake:  Mild decrease in energy intake (NPO x2 days)  Weight Loss:  No weight loss     Body Fat Loss:  No body fat loss     Muscle Mass Loss:  No muscle mass loss    Fluid Accumulation:  No fluid accumulation      NUTRITION DIAGNOSIS   Inadequate oral intake related to lack or limited access to food as evidenced by NPO or clear liquid status due to medical condition    Nutrition Monitoring and Evaluation:   Food/Nutrient Intake Outcomes:  Progression of Nutrition  Physical Signs/Symptoms Outcomes:  Biochemical Data, Nutrition Focused Physical Findings, Weight, Hemodynamic Status     OBJECTIVE DATA: Significant to nutrition assessment  Nutrition Related Findings: . Phos 2.0.  Wounds: None  Nutrition Goals: Initiation of nutrition, within 2 days     CURRENT NUTRITION THERAPIES  Diet NPO Exceptions are: Sips of Water with Meds  PO Intake: NPO   PO Supplement Intake:NPO  Additional Sources of Calories/IVF:N/A     COMPARATIVE STANDARDS  Energy (kcal):  3647-6299 (15-18 kcal/kg CBW)     Protein (g):  105-131 (0.8-1.0 g/kg CBW)       Fluid (ml/day):  1 mL/kcal    ANTHROPOMETRICS  Current Height: 180.3 cm 
  Comprehensive Nutrition Assessment  Vasopressor dosing equivalent score = 10. For VDE >12 trophic TF only (10 mL/hr).    RECOMMENDATIONS:  Initiate TF if pt remains intubated w/in 72 hours  Nutrition Education: Education/Counseling not appropriate     NUTRITION ASSESSMENT:   Nutritional summary & status: Pt intubated after coding 3 times. Requiring pressor support, VDE <12 safe for nutrition. Sedated on fentanyl and propofol. Propofol providing 414 kcal/day. NPO x2 days, no recent weight loss (weight gain over the past year). No plan to wean from vent today, will follow up tomorrow for nutrition support if pt remains intubated.   Admission // PMH: STEMI//Acute respiratory failure, DM, pulmonary edema, NSVT, polymorphic ventricular tachycardia, HTN    MALNUTRITION ASSESSMENT  Context of Malnutrition: Acute Illness   Malnutrition Status: At risk for malnutrition  Findings of the 6 clinical characteristics of malnutrition (Minimum of 2 out of 6 clinical characteristics is required to make the diagnosis of moderate or severe Protein Calorie Malnutrition based on AND/ASPEN Guidelines):  Energy Intake:  Mild decrease in energy intake (NPO x2 days)  Weight Loss:  No weight loss     Body Fat Loss:  No body fat loss     Muscle Mass Loss:  No muscle mass loss    Fluid Accumulation:  No fluid accumulation      NUTRITION DIAGNOSIS   Inadequate oral intake related to impaired respiratory function as evidenced by intubation    Nutrition Monitoring and Evaluation:   Food/Nutrient Intake Outcomes:  Progression of Nutrition  Physical Signs/Symptoms Outcomes:  Biochemical Data, Nutrition Focused Physical Findings, Weight, Hemodynamic Status     OBJECTIVE DATA: Significant to nutrition assessment  Nutrition Related Findings: Phos 2.1. . -1.5L.  Wounds: None  Nutrition Goals: Initiation of nutrition, within 2 days     CURRENT NUTRITION THERAPIES  PO Intake: NPO   PO Supplement Intake:NPO  Additional Sources of Calories/IVF:414 
2150: Pts BP was 96/56 HR 63. Nurse reached out to cardiology team about giving metoprolol. Med held per Miladys Jack NP.   
4 Eyes Admission Assessment     I agree as the admission nurse that 2 RN's have performed a thorough Head to Toe Skin Assessment on the patient. ALL assessment sites listed below have been assessed on admission.       Areas assessed by both nurses: Jeff RN, and Sarah RN  [x]   Head, Face, and Ears   [x]   Shoulders, Back, and Chest  [x]   Arms, Elbows, and Hands   [x]   Coccyx, Sacrum, and Ischium  [x]   Legs, Feet, and Heels        Does the Patient have Skin Breakdown?  No         Henry Prevention initiated:  No   Wound Care Orders initiated:  No      [facility] nurse consulted for Pressure Injury (Stage 3,4, Unstageable, DTI, NWPT, and Complex wounds) or Henry score 18 or lower:  No      Nurse 1 eSignature: Electronically signed by TENZIN WATERS RN on 1/20/25 at 7:25 PM EST    **SHARE this note so that the co-signing nurse is able to place an eSignature**    Nurse 2 eSignature: Electronically signed by Angeline Rodriguez RN on 1/20/25 at 7:25 PM EST    
4 Eyes Skin Assessment     NAME:  Jan Devine  YOB: 1955  MEDICAL RECORD NUMBER:  [id number]    The patient is being assessed for  Admission    I agree that at least one RN has performed a thorough Head to Toe Skin Assessment on the patient. ALL assessment sites listed below have been assessed.      Areas assessed by both nurses:    Head, Face, Ears, Shoulders, Back, Chest, Arms, Elbows, Hands, Sacrum. Buttock, Coccyx, Ischium, Legs. Feet and Heels, and Under Medical Devices         Does the Patient have a Wound? No noted wound(s)       Henry Prevention initiated by RN: No  Wound Care Orders initiated by RN: No    Pressure Injury (Stage 3,4, Unstageable, DTI, NWPT, and Complex wounds) if present, place Wound referral order by RN under : No    New Ostomies, if present place, Ostomy referral order under : No     Nurse 1 eSignature: Electronically signed by Michele Mir RN on 1/13/25 at 6:53 AM EST    **SHARE this note so that the co-signing nurse can place an eSignature**    Nurse 2 eSignature: Electronically signed by Carmen Amos RN on 1/13/25 at 6:54 AM EST   
Bedside shift handoff report completed at this time. Patient received intubated and sedated. Amiodarone, Fentanyl, Levophed and Propofol gtts infusing (See MAR). Patient able to open eyes to speech, responded to command in all four extremities, and nod appropriately to simple questions. See Flowsheets for full assessment. Patient is hemodynamically stable at this time.   
CHF Nutrition Education    Consult received for heart healthy diet education.  Nutrition therapy included low sodium diet guidelines, fluid restriction, foods to choose and foods to avoid, label reading, ways to add flavor to food, and monitoring daily weights.  Patient does not follow a low sodium diet.  All questions answered and patient voiced understanding.      Time spent with patient: 25 minutes      Electronically signed by Joanne Potts RD on 1/21/2025 at 11:15 AM  Carbon:  [phone]     
[facility]  Cardiology Inpatient Consult Service  Daily Progress Note        Admit Date:  1/12/2025    Referring Physician: Buster Woodward MD      Assessment & Plan:     Acute anterolateral STEMI, status post LAD intervention on 1/12/2025.  Mild residual LCx and RCA disease.  Acute respiratory failure requiring mechanical ventilation.  Currently extubated.  Acute systolic heart failure with LVEF of 35%  Polymorphic VT status post shock on postop day 2, possibly triggered by severe hypokalemia  Transient paroxysmal I-pyd-fijwqaeis in sinus rhythm  Essential hypertension  Hyperlipidemia  Obesity  Sleep apnea    Recommend increasing Aldactone to 25 mg daily.  Add digoxin.  Potassium being repleted.  Incentive spirometry.  Start PT/OT   LifeVest prior to discharge  Will try to switch to Entresto tomorrow  Jardiance on follow-up  Patient is gradually improving.  Wean O2 as tolerated  Possible out of ICU later today or tomorrow  Possible discharge in 48 to 72 hours.  Will need to decide whether patient will need inpatient rehab versus outpatient cardiac rehab.  Start Mucinex.  Okay to ambulate  Seriously ill but stable    Subjective:   Interval history:  Feeling weak.  Has cough.  Has expectoration.  No chest pain.    Medications:   insulin lispro  0-8 Units SubCUTAneous 4x Daily AC & HS    [START ON 1/19/2025] spironolactone  25 mg Oral Daily    digoxin  125 mcg Oral Daily    guaiFENesin  600 mg Oral BID    polyethylene glycol  17 g Oral Daily    amiodarone  400 mg Oral BID    metoprolol succinate  25 mg Oral Nightly    aspirin  81 mg Oral Daily    valsartan  40 mg Oral Daily    phosphorus  500 mg Oral BID    enoxaparin  30 mg SubCUTAneous BID    famotidine (PEPCID) injection  20 mg IntraVENous BID    sodium chloride flush  5-40 mL IntraVENous 2 times per day    ticagrelor  90 mg Oral BID    rosuvastatin  40 mg Oral Nightly       IV drips:   sodium chloride Stopped (01/16/25 1912)    
Dr. Woodward bedside speaking with patient's family - spouse, two daughters, and son.  
ECHO aware of pending exam and will arrive shortly.  
INTERVENTIONAL CARDIOLOGY UPDATE NOTE    Briefly this is a 69-year-old male who presented with a left anterior descending artery acute STEMI a few nights ago who underwent successful percutaneous coronary intervention.  Received an IVUS guided drug-eluting stent with excellent post result EF 35 to 40% with apical hypokinesis.  LVEDP was 27 at that time so he started on 40 IV Lasix.      Given pulmonary edema and high volume status a Lasix drip was continued throughout the day it looks like one of his renal panels today did result in a K that was 3.0 but despite this the drip was continued and not stopped - then later this evening the potassium has drifted in the twos.  The patient has had repeated salvos of polymorphic VT/VT, EKG was performed with no evidence of ST elevation however does have clear U waves being consistent with profound hypokalemia.  The patient was defibrillated 2-3 times before interventional cardiology was called.    Review of the EKG shows no evidence of disturbance of recently implanted stent however given the U waves I did suggest potassium and magnesium replacement.  Additionally only 150 of amio was given I did ask for a bolus of additional 150 of amio in addition to the infusion at 1 mg.    The patient unfortunately continued to have repeated episodes of ventricular based tachyarrhythmias requiring more and more rounds of defibrillation which has now resulted in him being intubated.  Postintubation he was hypotensive cardiology was called for additional recommendations for pressors.  Sounds like the only thing he has been given so far was some short acting joshua.    My guess is that the CVP is still elevated based on his recent LVEDP however he was diuresed pretty aggressively so I have no idea what his actual volume status is.  At this point after being shocked several times in addition to the sedation and paralytics required for the intubation I am guessing that he probably just needs an 
Interventional cardiology progress note    This patient was seen yesterday with evidence for acute massive anterior wall infarct.  He was treated with balloon angioplasty stenting with aspiration thrombectomy of the LAD.  Apparently had some LV dysfunction overnight developed shortness of breath and dyspnea thought to be due to volume overload and has been treated with diuretics and is currently on a Lasix drip.  Patient has indeed diuresed very nicely and is feeling better and breathing better and no evidence for distress at this time.    Examinations unremarkable.  The lungs are clear the extremities do not show edema.  The echocardiogram today is pending.  At this point I think we should continue the Lasix drip.  Watch his BUN and creatinine tomorrow.  Will follow the patient up on the next several days while he is in house.    Patient's angiogram images are reviewed.  Case is discussed with Dr. Ricardo Woodward MD, FACC  
Lab called with RFP and K results, stating results of both 4.2 and 2.5 for current potassium level. Two results returned this morning, also, despite sending one lab tube.      Will redraw stat K level to confirm. Residents notified and have asked for both STAT CVC draw and POC.     POC K is 3.6.  
MECHANICAL VENTILATION WEANING PROTOCOL    PRE-TRIAL PATIENT ASSESSMENT - COMPLETED AT 1618    Ventilatory Assessment:    PARAMETER CRITERIA FOR WEANING   Spontaneous Cough:  Yes    Sputum Characteristics:          SPONTANEOUS COUGH With small to moderate  Amount of secretions   FiO2 : 40 % FIO2 less than or equal to 50%     PEEP less than or equal to 8   Progressive Mobility Protocol  N/a     ABG:  Lab Results   Component Value Date/Time    PHART 7.467 01/14/2025 07:43 AM    KSY3EPT 45.4 01/14/2025 07:43 AM    PO2ART 177.2 01/14/2025 07:43 AM    S3WBXYSK 100 01/14/2025 07:43 AM    QGG5YSY 32.8 01/14/2025 07:43 AM    BEART 9 01/14/2025 07:43 AM     HGB/WBC:  Lab Results   Component Value Date/Time    HGB 14.7 01/15/2025 03:27 AM    WBC 21.8 01/15/2025 03:27 AM        Vital Signs:    PARAMETER CRITERIA FOR WEANING Meets Criteria   Pulse: 86 Within patient's normal limits / stable Yes   Respirations: 17 Less than or equal to 30 Yes   BP: (!) 143/87 Within patient's normal limits / minimal pressors (Hemodynamically Stable) Yes   SpO2: 95 % Greater than or equal to 90% Yes     Within patient's normal limits Yes   Temp: (!) 100.6 °F (38.1 °C) Less than 38.5oC / 101.3oF Yes     [x]    Based on this assessment and the  Ventilator Weaning Protocol, this patient  IS being placed on a Spontaneous Breathing Trial (SBT) at this time.  []    Based on this assessment and the  Ventilator Weaning Protocol, this patient  IS NOT being placed on a Spontaneous Breathing Trial (SBT) at this time.  []    Patient  IS NOT being placed on a Spontaneous Breathing Trial (SBT) at this time because of factors not previously addressed.  Those factors include        SBT - Initiated at  1620    Ventilator Settings:  CPAP - 5 cmH2O, PS - 8 cmH2O(if using settings other than CPAP 5/PS 8, please explain reasons for settings here):       1 Minute SBT Respiratory Parameters:   VE: 8.9 L   RR: 13 b/m   VT: 502 mL (average VT = VE/RR)   RSBI: 35 
Occupational / Physical Therapy  Sign off   Code blue this AM pt now intubated. Will sign off of acute therapies at this time. Please re-order when appropriate.    Kiara Benavidez, MOT, OTR/L, CNS  Christianne Sarmiento, PT, DPT      
Occupational Therapy  Facility/Department: [address]  Occupational Therapy Treatment    Name: Jan Devine  : 1955  MRN: [id number]  Date of Service: 2025    Discharge Recommendations:  24 hour supervision or assist, Home with Home health OT  OT Equipment Recommendations  Equipment Needed: No       Patient Diagnosis(es): The primary encounter diagnosis was ST elevation myocardial infarction (STEMI), unspecified artery (HCC). Diagnoses of STEMI (ST elevation myocardial infarction) (HCC) and Acute coronary syndrome (HCC) were also pertinent to this visit.  Past Medical History:  has a past medical history of COVID, Diabetes mellitus (HCC), Gastroesophageal reflux disease without esophagitis, Hypertension, Pneumonia due to COVID-19 virus, and Rash.  Past Surgical History:  has a past surgical history that includes Cardiac procedure (N/A, 2025); Cardiac procedure (N/A, 2025); and Cardiac procedure (N/A, 2025).    Treatment Diagnosis: imp mob, tf, ADL      Assessment  Performance deficits / Impairments: Decreased functional mobility ;Decreased ADL status;Decreased endurance;Decreased cognition  Assessment: Pt is progressing well.  Pt req SBA for functional mobility.  SBA for sink activities and to don pants.  Pt's wife said she could stay home with him a few days for home discharge.  Relayed to nurse that pt and wife would like to speak with dietition regarding cardiac diet.  Cont OT tx per plan of care.  Treatment Diagnosis: imp mob, tf, ADL  Prognosis: Good  REQUIRES OT FOLLOW-UP: Yes  Activity Tolerance  Activity Tolerance: Patient Tolerated treatment well     Plan  Occupational Therapy Plan  Times Per Week: 2-5  Current Treatment Recommendations: Balance training, Functional mobility training, Endurance training, Self-Care / ADL    Restrictions  Position Activity Restriction  Other Position/Activity Restrictions: up with assist    Subjective  General  Chart Reviewed: 
Occupational Therapy  Facility/Department: [facility] ICU  Occupational Therapy Initial Assessment    Name: Jan Devine  : 1955  MRN: [id number]  Date of Service: 2025    Discharge Recommendations:  24 hour supervision or assist, Home with Home health OT  OT Equipment Recommendations  Equipment Needed: No       Patient Diagnosis(es): The primary encounter diagnosis was ST elevation myocardial infarction (STEMI), unspecified artery (HCC). Diagnoses of STEMI (ST elevation myocardial infarction) (HCC) and Acute coronary syndrome (HCC) were also pertinent to this visit.  Past Medical History:  has a past medical history of COVID, Diabetes mellitus (HCC), Gastroesophageal reflux disease without esophagitis, Hypertension, Pneumonia due to COVID-19 virus, and Rash.  Past Surgical History:  has no past surgical history on file.    Treatment Diagnosis: imp mob, tf, ADL      Assessment  Performance deficits / Impairments: Decreased functional mobility ;Decreased ADL status;Decreased endurance;Decreased cognition  Assessment: From home with spouse, admit with NSTEMI. Pt completing mobility and transfers with CGA to Joel no AD. Short mobility to and from bathroom, on 10L O2 but satting 100% throughout. Pt moves fairly well but is impulsive with decreased safety awareness requiring increased cues. Assist x1 with second person for lines management at this time. Would benefit from cont therapies while in acute care. Rec home with assist at CT pending progress.  Treatment Diagnosis: imp mob, tf, ADL  Decision Making: Medium Complexity  REQUIRES OT FOLLOW-UP: Yes  Activity Tolerance  Activity Tolerance: Patient Tolerated treatment well;Treatment limited secondary to decreased cognition     Plan  Occupational Therapy Plan  Times Per Week: 2-5    Restrictions  Position Activity Restriction  Other Position/Activity Restrictions: up with assist    Subjective  General  Chart Reviewed: Yes  Additional Pertinent Hx: 69 y.o. male 
Occupational Therapy  Facility/Department: [facility] ICU  Occupational Therapy Initial Assessment    Name: Jan Devine  : 1955  MRN: [id number]  Date of Service: 2025    Discharge Recommendations:  IP Rehab  At baseline this patient was IND with functional mobility & ADL's. The current hospitalization has resulted in the patient now being limited with all aspects of mobility, negatively impacting the patient's functional independence, ability to safely access their home environment, and ability to complete valued daily tasks and life roles. Jan Devine is motivated for recovery and demonstrates the ability to tolerate inpatient rehabilitation 3 hours/day, 5 days/week for optimal return to functional independence, quality of life, and decreased caregiver burden.   OT Equipment Recommendations  Equipment Needed: No       Patient Diagnosis(es): The primary encounter diagnosis was ST elevation myocardial infarction (STEMI), unspecified artery (HCC). Diagnoses of STEMI (ST elevation myocardial infarction) (HCC) and Acute coronary syndrome (HCC) were also pertinent to this visit.  Past Medical History:  has a past medical history of COVID, Diabetes mellitus (HCC), Gastroesophageal reflux disease without esophagitis, Hypertension, Pneumonia due to COVID-19 virus, and Rash.  Past Surgical History:  has a past surgical history that includes Cardiac procedure (N/A, 2025); Cardiac procedure (N/A, 2025); and Cardiac procedure (N/A, 2025).    Treatment Diagnosis: imp mob, tf, ADL      Assessment  Performance deficits / Impairments: Decreased functional mobility ;Decreased ADL status;Decreased endurance;Decreased cognition  Assessment: From home with spouse, admit with NSTEMI. Re intubated-  Hospital course has been complicated by x3 episodes of cardiac arrest with Vfib. Extubated . Pt requiring assist x1-2 this date mobility in room to and from bathroom, to doorway. Assist with ADLs. Pt would 
Offered follow up to family at bedside, they are currently visiting with a member of their [employer] and did not identify further spiritual care needs at this time.  will continue to round as available.    01/14/25 1300   Encounter Summary   Encounter Overview/Reason Follow-up   Service Provided For Family   Referral/Consult From Rounding   Support System Spouse;Family members;Children;Religion/alanna community   Last Encounter  01/14/25  (GRD F/U)   Complexity of Encounter Low   Begin Time 1255   End Time  1300   Total Time Calculated 5 min   Crisis   Type Follow up;Family Care   Spiritual/Emotional needs   Type Spiritual Support   Assessment/Intervention/Outcome   Assessment Calm;Hopeful;Coping   Intervention Active listening   Outcome Comfort;Receptive;Optimistic       
Patient assessment unchanged throughout shift. Propofol, Fentanyl, and Levophed titrated per MAR parameters and ICU team verbal order (See MAR). VSS.  
Patient has been successfully weaned from Mechanical Ventilation.  RSBI before extubation was 60 with EtCO2 of 44 and SpO2 of 98 on 40% FiO2.  Patient extubated and placed on 4 liters/min via  patients home bipap .  Post extubation SpO2 is 92% with HR  68 bpm and RR 26 breaths/min.  Patient had mild cough that was non-productive.  Extubation fairly.     SpO2 dropped to 80% on home bipap with 4L, increased O2 bleed in to 10L. SpO2 now 94%. Pt not in respiratory distress. Will continue to monitor and wean as appropriate.   
Patient transferred to [facility] with all belongings. No questions from receiving RN.   
Patient was able to follow commands on all four extremities and opened eyes to voice. He nods appropriately.    Sedation ultimately increased d/t agitation. See MAR.  
Patient's blood pressure increased from 94/58 (69) to 124/77 (92) upon initiation of albumin bolus (on 10 mcg/min Levophed).  
Physical Therapy  Facility/Department: [address]  Physical Therapy Treatment    Name: Jan Devine  : 1955  MRN: [id number]  Date of Service: 2025    Discharge Recommendations:  24 hour supervision or assist, Home with Home health PT   PT Equipment Recommendations  Equipment Needed: No      Patient Diagnosis(es): The primary encounter diagnosis was ST elevation myocardial infarction (STEMI), unspecified artery (HCC). Diagnoses of STEMI (ST elevation myocardial infarction) (HCC) and Acute coronary syndrome (HCC) were also pertinent to this visit.  Past Medical History:  has a past medical history of COVID, Diabetes mellitus (HCC), Gastroesophageal reflux disease without esophagitis, Hypertension, Pneumonia due to COVID-19 virus, and Rash.  Past Surgical History:  has a past surgical history that includes Cardiac procedure (N/A, 2025); Cardiac procedure (N/A, 2025); and Cardiac procedure (N/A, 2025).    Assessment  Body Structures, Functions, Activity Limitations Requiring Skilled Therapeutic Intervention: Decreased functional mobility ;Decreased endurance;Decreased balance;Decreased strength;Decreased safe awareness  Assessment: Pt has progressed well from previous session.  Transferring with SBA, ambulating without AD CG/SBA and ascended/descended steps with CGA.  Slightly unsteady and appears easily distracted however no overt LOB noted.  Pt plans to return home with spouse.  Has no concerns about managing.  Rec home with 24 hr assist initailly and home PT.  Will follow to maximize mobility and independence.  Treatment Diagnosis: Decreased functional mobility       Plan  Physical Therapy Plan  General Plan:  (2-5)  Current Treatment Recommendations: Strengthening, Balance training, Functional mobility training, Transfer training, Gait training, Stair training, Endurance training, Therapeutic activities, Safety education & training, Home exercise program, Neuromuscular 
Physical Therapy  Facility/Department: [facility] ICU  Physical Therapy Initial Assessment    Name: Jan Devine  : 1955  MRN: [id number]  Date of Service: 2025    Discharge Recommendations:  IP Rehab, Patient able to tolerate 3 hours of therapy per day   PT Equipment Recommendations  Other: defer    At baseline this patient was IND with functional mobility & ADL's. The current hospitalization has resulted in the patient now being limited with all aspects of mobility, negatively impacting the patient's functional independence, ability to safely access their home environment, and ability to complete valued daily tasks and life roles. Jan Devine is motivated for recovery and demonstrates the ability to tolerate inpatient rehabilitation 3 hours/day, 5 days/week for optimal return to functional independence, quality of life, and decreased caregiver burden.        Patient Diagnosis(es): The primary encounter diagnosis was ST elevation myocardial infarction (STEMI), unspecified artery (HCC). Diagnoses of STEMI (ST elevation myocardial infarction) (HCC) and Acute coronary syndrome (HCC) were also pertinent to this visit.  Past Medical History:  has a past medical history of COVID, Diabetes mellitus (HCC), Gastroesophageal reflux disease without esophagitis, Hypertension, Pneumonia due to COVID-19 virus, and Rash.  Past Surgical History:  has a past surgical history that includes Cardiac procedure (N/A, 2025); Cardiac procedure (N/A, 2025); and Cardiac procedure (N/A, 2025).    Assessment  Body Structures, Functions, Activity Limitations Requiring Skilled Therapeutic Intervention: Decreased functional mobility ;Decreased endurance;Decreased balance;Decreased strength;Decreased safe awareness  Assessment: 69 y.o. male with a medical hx significant for T2DM, HTN, HLD, otherwise as listed in the MHx table below, who presented from home to the ED on  with chest pain, nausea, and vomiting, caridac 
Physical Therapy  Facility/Department: [facility] ICU  Physical Therapy Initial Assessment/Treatment    Name: Jan Devine  : 1955  MRN: [id number]  Date of Service: 2025    Discharge Recommendations:  24 hour supervision or assist   PT Equipment Recommendations  Equipment Needed: No      Patient Diagnosis(es): The primary encounter diagnosis was ST elevation myocardial infarction (STEMI), unspecified artery (HCC). Diagnoses of STEMI (ST elevation myocardial infarction) (HCC) and Acute coronary syndrome (HCC) were also pertinent to this visit.  Past Medical History:  has a past medical history of COVID, Diabetes mellitus (HCC), Gastroesophageal reflux disease without esophagitis, Hypertension, Pneumonia due to COVID-19 virus, and Rash.  Past Surgical History:  has no past surgical history on file.    Assessment  Body Structures, Functions, Activity Limitations Requiring Skilled Therapeutic Intervention: Decreased functional mobility   Assessment: 69 y.o. male with a medical hx significant for T2DM, HTN, HLD, otherwise as listed in the MHx table below, who presented from home to the ED on  with chest pain, nausea, and vomiting. Pt currently requiring Ax1 for bed mobility, transfers and amb. Pt on 10L O2 (RA at baseline) and limited physically by several lines. Pt is below his baseline and would benefit from further skilled PT to maximize safety and independence with functional mobility. Will continue to follow.  Treatment Diagnosis: Decreased functional mobility  Therapy Prognosis: Good  Decision Making: Medium Complexity  Requires PT Follow-Up: Yes  Activity Tolerance  Activity Tolerance: Patient tolerated evaluation without incident;Patient tolerated treatment well  Activity Tolerance Comments: 10L O2, satting in upper 90's-100 throughout session.    Plan  Physical Therapy Plan  General Plan:  (2-5)  Current Treatment Recommendations: Strengthening, Balance training, Functional mobility training, 
Placed pt on bipap during cath lab procedure. Bipap settings 15/5 100%. Pt alert, tolerating mask well. Spo2 97%.  
Pt Gurjit Stick Timeline    0510: 100mcg gurjit  0519: 200mcg gurjit  0523: 200mcg gurjit  
Pt able to tolerate sips of water at this time, PO medications provided.   
Pt call light rang out, this RN entered the room to find patient unresponsive, in Vtach without a pulse. Code blue was called, compressions initiated. Response team at bedside. See ICU resident note for code details.  
Pt complained of Nausea. Removed from BiPAP and placed on Nasal Cannula for patient safety. SpO2 probe placed on several different locations to obtain good pleth. Difficulty keeping SpO2 above 88%. Changed to Hi Flow Nasal Cannula. Titrated again until SpO2 appeared stable and in the 90's. Pt stable on HFNC @ 10L.        01/12/25 2320   Oxygen Therapy/Pulse Ox   O2 Therapy Oxygen humidified   O2 Device High flow nasal cannula   O2 Flow Rate (L/min) 10 L/min   Pulse (!) 119   Respirations 19   SpO2 92 %   Skin Assessment Clean, dry, & intact   Pulse Oximeter Device Mode Continuous   Pulse Oximeter Device Location Other(comment)  (right ear)       
Pt to ICU 4509 via bed from cath lab at this time. Pt alert and oriented x 4, very anxious on bipap at this time. Lung fields with very coarse crackles throughout, breathing labored with tachypnea noted. TR band to right radial artery puncture site.   
Pt went into Torsades/Vtach which then went to Vfib. This RN entered room to once again find Pt unresponsive without a pulse. Code blue called, compressions initiated, response team at bedside. See ICU resident note for code details  
Pt went into Vfib on monitor, this RN entered room to find patient unresponsive without a pulse. Code blue called, compressions initiated, response team at bedside. See ICU resident note for code details.  
Pt's family expressed coping this morning. Their  visited with family during crisis. Pt's wife is accompanied by their three adult children at bedside. No spiritual care needs identified at this moment, but  will continue to offer regular follow up throughout the day and week as available and upon request. Family affirmed that they are continuing to pray for the patient's recovery.      01/14/25 0829   Encounter Summary   Encounter Overview/Reason Follow-up;Grief, Loss, and Adjustments   Service Provided For Patient and family together   Referral/Consult From Other    Support System Spouse;Children;Mormon/danyelle community   Last Encounter  01/14/25   Complexity of Encounter Moderate   Begin Time 0815   End Time  0830   Total Time Calculated 15 min   Crisis   Type Follow up;Family Care;Emotional distress   Spiritual/Emotional needs   Type Emotional Distress;Spiritual Support   Grief, Loss, and Adjustments   Type Adjustment to illness   Assessment/Intervention/Outcome   Assessment Calm;Hopeful   Intervention Active listening;Sustaining Presence/Ministry of presence   Outcome Acceptance;Receptive     Spiritual Health History and Assessment/Progress Note  [facility]    (P) Follow-up, Grief, Loss, and Adjustments, (P) Follow up, Family Care, Emotional distress, (P) Adjustment to illness,      Name: Jan Devine MRN: [id number]    Age: 69 y.o.     Sex: male   Language: English   Christianity: Unknown   STEMI (ST elevation myocardial infarction) ([facility])     Date: 1/14/2025            Total Time Calculated: (P) 15 min              Spiritual Assessment continued in [facility] ICU        Referral/Consult From: (P) Other    Encounter Overview/Reason: (P) Follow-up, Grief, Loss, and Adjustments  Service Provided For: (P) Patient and family together    Danyelle, Belief, Meaning:   Patient is connected with a danyelle tradition or spiritual practice  Family/Friends are connected with a danyelle 
Report received from PM nurse.     K is 4.0 on POC. Will replace an additional 20 meq.     FiO2 decreased from 0.60 to 0.40 post-ABG for PO2 177.    
Reviewed discharge instructions with patient and  spouse.  Reviewed discharge medications including dosing, schedule, indication, and adverse reactions.  Reviewed which medications were already taken today and next dosage due for each medication.        Patient verbalized understanding of all instructions and questions were answered to his. satisfaction. Patient discharged to home per wheelchair with spouse.      Mitzy Prieto RN  
Suctioning held due to low BP and MAP. 8.0 Tube 24@teeth.      01/14/25 0543   Patient Observation   Pulse 66   Respirations 16   SpO2 98 %   ETCO2 (mmHg) 41 mmHg   Breath Sounds   Respiratory Pattern Regular   Breath Sounds Bilateral Diminished;Rales   Vent Information   Ventilator Day(s) 0   Ventilator ID [id number]   Ventilator Safety Check Performed Pre-Use Yes   Ventilator Initiate Yes   Vent Mode AC/PRVC   $Ventilation $Initial Day   Ventilator Settings   FiO2  60 %   Insp Time (sec) 0.9 sec   Resp Rate (Set) 16 bpm   PEEP/CPAP (cmH2O) 5   Vt (Set, mL) 550 mL   Vent Patient Data (Readings)   Vt Mandatory Exp (mL) 550 mL   Vt (Measured) 562 mL   Peak Inspiratory Pressure (cmH2O) 24 cmH2O   Rate Measured 16 br/min   Minute Volume (L/min) 9.1 Liters   Mean Airway Pressure (cmH2O) 9 cmH20   Inspiratory Time 0.9 sec   I:E Ratio 1:3.1   Flow Sensitivity 2 L/min   Dynamic Compliance (L/cm H2O) 27 L/cm H2O   I Time/ I Time % 0.9 s   Insp Rise Time (%) 200 %   Backup Apnea On   Backup Rate 16 Breaths Per Minute   Backup Vt 550   Backup I Time 1   Vent Alarm Settings   Low Pressure (cmH2O) 10 cmH2O   High Pressure (cmH2O) 45 cmH2O   Low Minute Volume (lpm) 4 L/min   High Minute Volume (lpm) 20 L/min   Low Exhaled Vt (ml) 200 mL   High Exhaled Vt (ml) 1200 mL   RR Low (bpm) 10   RR High (bpm) 40 br/min   Apnea (secs) 20 secs   ETCO2/TCM Max 50 mmHg   ETCO2/TCM Min 20 mmHg   Additional Respiratoray Assessments   Humidification Source HME   Circuit Condensation Not drained   Ambu Bag With Mask At Bedside Yes   Airway Clearance   Suction   (held due to low BP)   Subglottic Suction Done No   ETT    Placement Date/Time: 01/14/25 0520   Present on Admission/Arrival: No  Placed By: Licensed provider  Placement Verified By: Auscultation;Direct visualization;Colorimetric ETCO2 device;Capnometry;Chest X-ray  Preoxygenation: Yes  Mask Ventilation: Vent...   $ Intubation Emergent  $ Yes   Secured At 24 cm   Measured From Teeth   ETT 
[facility]  Cardiology Inpatient Consult Service  Daily Progress Note        Admit Date:  1/12/2025    Referring Physician: Buster Woodward MD      Assessment & Plan:     Acute anterolateral STEMI, status post LAD intervention on 1/12/2025.  Mild residual LCx and RCA disease.  Acute respiratory failure requiring mechanical ventilation.  Currently extubated.  Acute systolic heart failure with LVEF of 35%  Polymorphic VT status post shock on postop day 2, possibly triggered by severe hypokalemia  Transient paroxysmal [id number]-[id number] in sinus rhythm  Essential hypertension  Hyperlipidemia  Obesity  Sleep apnea  Hypokalemia    Continue Aldactone.  Give 1 additional dose of K-Dur  Start Jardiance  Not switching to Entresto yet due to few low blood blood pressure readings.  Will reassess tomorrow.  LifeVest prior to discharge  PT OT  Transfer out of ICU  Will need to decide about diabetic medications prior to discharge  Reduce amiodarone to 400 daily  Possible discharge in 24 to 48 hours  Okay to ambulate  Wean O2  Seriously ill but stable      Subjective:   Interval history:  Feeling slightly better.  No chest pain.    Medications:   [START ON 1/20/2025] amiodarone  400 mg Oral Daily    empagliflozin  10 mg Oral Daily    insulin lispro  0-8 Units SubCUTAneous 4x Daily AC & HS    spironolactone  25 mg Oral Daily    digoxin  125 mcg Oral Daily    guaiFENesin  600 mg Oral BID    polyethylene glycol  17 g Oral Daily    metoprolol succinate  25 mg Oral Nightly    aspirin  81 mg Oral Daily    valsartan  40 mg Oral Daily    phosphorus  500 mg Oral BID    enoxaparin  30 mg SubCUTAneous BID    sodium chloride flush  5-40 mL IntraVENous 2 times per day    ticagrelor  90 mg Oral BID    rosuvastatin  40 mg Oral Nightly       IV drips:   sodium chloride Stopped (01/16/25 1912)    dextrose         PRN:  phenol, senna, nitroGLYCERIN, sodium chloride flush, sodium chloride, acetaminophen, sulfur 
[facility] - Acute Rehab Unit   After review, this patient is felt to be:       []  Dr. Tate states this patient is appropriate for rehab.     [x]  Not appropriate for Acute Inpatient Rehab    []  Referral received and ARU reviewing patient      Patient to go home with [facility] with home recommendations from therapy.   Will notify CM/SW with further updates. Thank you for the referral.    Jeaneth AGGARWAL, OTR/L  Clinical Liaison- [facility]   (P): [phone]  (F): [phone]    
[facility]  Cardiology Daily Progress Note  1/16/2025,1:16 PM      Buster Woodward MD ,[facility]  Ashley Lagunas MD  Primary cardiologist:    Admit Date:  1/12/2025  Hospital Day: Hospital Day: 5  Subjective:  Mr. Devine on today 1/16/2025 and is awaking.  To be extubated shortly.  Hemodynamics are stable.  Good urine output.    .    Large anterior wall infarct on 1/12/2025.  Cardiomyopathy ischemic ejection fraction 30 to 35%.  Ventricular tachycardia with ventricular fibrillation during the course of this admission.  Atrial fibrillation during the course of this admission.         Objective:   /81   Pulse 63   Temp (!) 100.8 °F (38.2 °C) (Axillary)   Resp 18   Ht 1.803 m (5' 11\")   Wt 132 kg (291 lb 0.1 oz)   SpO2 (!) 83% Comment: MD made aware  BMI 40.59 kg/m²     Intake/Output Summary (Last 24 hours) at 1/16/2025 1316  Last data filed at 1/16/2025 1007  Gross per 24 hour   Intake 2010.2 ml   Output 1000 ml   Net 1010.2 ml       TELEMETRY: Sinus 50     Physical Examination:    Vitals:    01/16/25 1220 01/16/25 1230 01/16/25 1245 01/16/25 1300   BP: (!) 79/53 (!) 83/51 124/76 126/81   Pulse: 65 66 64 63   Resp: 30 12 10 18   Temp: (!) 100.8 °F (38.2 °C)      TempSrc: Axillary      SpO2: 92% (!) 83%     Weight:       Height:            Constitutional and General Appearance: Sedated on ventilator  HEENT: eyes and ears intact. No nasal masses  THYROID: not enlarged  LUNGS:  Clear to auscultation and percussion  HEART and VASCULAR:  The apical impulses not displaced  Heart tones are crisp and normal  Cervical veins are not engorged  The carotid upstroke is normal in amplitude and contour without delay or bruit  Peripheral pulses are symmetrical and full  There is no clubbing, cyanosis of the extremities.  No peripheral edema  Femoral Arteries: 2+ and equal  Pedal Pulses: 2+ and equal   ABDOMEN::  No masses or tenderness  Liver/Spleen: No Abnormalities 
[facility]  Cardiology Daily Progress Note  1/15/2025,9:48 AM      Buster Woodward MD ,Ashley Cox MD  Primary cardiologist:    Admit Date:  1/12/2025  Hospital Day: Hospital Day: 4  Subjective:  Mr. Devine on today 1/15/2025 is still in the ICU and still on ventilator.  Still requiring some small amount of pressor support.  Rhythm is stable sinus mechanism and I believe that his potassium issues are stabilized with the level of 4.1 today.  Magnesium was 2.26.  Lungs are clear the heart is regular.    Large anterior wall infarct on 1/12/2025.  Cardiomyopathy ischemic ejection fraction 30 to 35%.  Ventricular tachycardia with ventricular fibrillation during the course of this admission.  Atrial fibrillation during the course of this admission.    Hopefully the patient will be weanable today from the ventilator.  I do believe that when his sedation is reduced there evidence that the neurologic status is probably intact.  At near discharge will be watched and monitor closely for need for LifeVest.  Continuation of his IV amiodarone at 5 mg.  No Lasix at this time as his O2 sats are 100%.    Objective:   /73   Pulse 80   Temp 99.3 °F (37.4 °C) (Axillary)   Resp 18   Ht 1.803 m (5' 11\")   Wt 131.5 kg (289 lb 14.5 oz)   SpO2 97%   BMI 40.43 kg/m²     Intake/Output Summary (Last 24 hours) at 1/15/2025 0948  Last data filed at 1/15/2025 0600  Gross per 24 hour   Intake 2286.45 ml   Output 635 ml   Net 1651.45 ml       TELEMETRY: Sinus 50     Physical Examination:    Vitals:    01/15/25 0900 01/15/25 0912 01/15/25 0915 01/15/25 0930   BP: 106/61  125/70 123/73   Pulse: 57 57 68 80   Resp: 16 16 16 18   Temp:       TempSrc:       SpO2: 97% 97%     Weight:       Height:            Constitutional and General Appearance: Sedated on ventilator  HEENT: eyes and ears intact. No nasal masses  THYROID: not enlarged  LUNGS:  Clear to auscultation and percussion  HEART 
[facility]  Cardiology Daily Progress Note  1/14/2025,3:46 PM      Buster Woodward MD ,Ashley Cox MD  Primary cardiologist:    Admit Date:  1/12/2025  Hospital Day: Hospital Day: 3  Subjective:  Mr. Devine is lethargic and has been sedated and is on the ventilator.  Had runs of ventricular tachycardia and ventricular fibrillation last night and seemingly was all related to hypokalemia with a level as low as 2.5.  The potassium has been improved upon and is currently at 4.0.  Did have to receive several IV packets of potassium overnight.  The rhythm today is stable and he is on amiodarone and sinus without ventricular arrhythmias.  Potassium was 4.0 and is being monitored every 4 hours until stability occurs.  He is on ventilator and sedated with propofol and his blood pressure is soft at 92/64.    Myocardial infarction on 1/12/2024 anterior wall  V. tach V-fib arrest last night requiring several doses of IV potassium.  Hypokalemia and unstable potassium levels etiology unclear.  Azotemia with creatinine up to 1.6 and decreasing urine output.      Objective:   BP (!) 105/59   Pulse 62   Temp 99.6 °F (37.6 °C) (Axillary)   Resp 16   Ht 1.803 m (5' 11\")   Wt 130.2 kg (287 lb)   SpO2 96%   BMI 40.03 kg/m²     Intake/Output Summary (Last 24 hours) at 1/14/2025 1546  Last data filed at 1/14/2025 1441  Gross per 24 hour   Intake 2102.75 ml   Output 1540 ml   Net 562.75 ml       TELEMETRY: Currently sinus rhythm 75/min.     Physical Examination:    Vitals:    01/14/25 1445 01/14/25 1500 01/14/25 1515 01/14/25 1530   BP: 100/63 (!) 108/58 105/62 (!) 105/59   Pulse: 61 63 62 62   Resp: 16 16 16 16   Temp:       TempSrc:       SpO2: 98% 95% 96% 96%   Weight:       Height:            Constitutional and General Appearance:  Healthy.  On ventilator sedated.  HEENT: eyes and ears intact. No nasal masses  THYROID: not enlarged  LUNGS:  Clear to auscultation and 
This RN called lab at 2226 because 2200 timed Renal function panel and Mag not yet drawn. Per lab, \"someone will be up soon.\" Labs obtained at 2258, results below.     Latest Reference Range & Units 01/15/25 22:58   Sodium 136 - 145 mmol/L 140   Potassium 3.5 - 5.1 mmol/L 4.5   Chloride 99 - 110 mmol/L 103   CARBON DIOXIDE 21 - 32 mmol/L 26   BUN,BUNPL 7 - 20 mg/dL 38 (H)   Creatinine 0.8 - 1.3 mg/dL 1.1   Anion Gap 3 - 16  11   Est, Glom Filt Rate >60  71   Magnesium 1.80 - 2.40 mg/dL 2.31   Glucose 70 - 99 mg/dL 217 (H)   Calcium 8.3 - 10.6 mg/dL 8.4   Phosphorus 2.5 - 4.9 mg/dL 3.2   (H): Data is abnormally high  
This evening's potassium is non-hemolyzed at 3.7. Residents aware of value and have ordered 20 meq KCL. [facility] continues to infuse - see MAR  
Unable to obtain 0200 labs through CVC. Lab notified, stated \"someone will be up soon.\"  
Was unable to obtain x 2 intubation. Assist MD with Intubation size # 8 ETT at 24 lip. Used [place] scope with 4 blade. Good color change with CO2 detector. Bilaterally breath sounds noted.   
[FreeTextEntry1] : \par We discussed ozempic in office and there is no cardiac contraindication to this.\par \par # Pre operative cv evaluation: METS>4. EF preserved. normal perfusion imaging recently. no unstable symptoms. No recent CV hospitalizations. No recent ACS, decompensated heart failure, preclusive arrhythmias.\par - No further cardiac testing required prior to procedure. Continue cardiovascular medications as tolerated lakhwinder-procedurally except can hold brilinta 3 days prior and restart after at discretion of proceduralist ideally decrease duration off this. Patient is not at a prohibitive risk for AMI or CHF lakhwinder-procedurally.\par \par # CAD mid RCA ISR now with shockwave/laser/cutting balloon and brachytherapy:\par - "heartburn" resolved. normal perfusion imaging. monitor for recurrence. intolerant to beta blocker due to fatigue.\par - give pcsk9i 2 more months, if LDL still uncontrolled, then consider further therapy. \par - dapt long term as tolerated given multiple stents including LAD/Diag\par Stents and interventions as above. On ASA and Brilinta. On Isosorbide.\par - restart cardiac rehab if approved \par \par Thoracic aorta dilation 4.3 cm. \par - yearly CTA chest and AAA u/s next 5/2024 \par \par HTN: Intolerant to Olmesartan and beta blockers. nitrate. if BP uncontrolled, can do diltiazem low dose. \par \par CKD: serial labs. was dehydrated \par \par hiatal hernia could be cause of his epigastric/central chest pain too. pending procedure as detailed above. \par \par Follow in 3 months with labwork. ER precautions given to patient.\par 
Earl  o: [phone]  [address], [address]  [place] [address]      NOTE:  This report was transcribed using voice recognition software.  Every effort was made to ensure accuracy; however, inadvertent computerized transcription errors may be present.  
1/12/2025 with anterior STEMI now s/p HECTOR to LAD on HD2. Hospital course has been complicated by x3 episodes of cardiac arrest with Vfib. Patient is now intubated and on low dose pressor support.     Cardiology  #Anterolateral STEMI s/p PCI and HECTOR (1/12)  #Acute HFrEF (EF 35-40%)  Lateral anterior, s/p PCI with HECTOR in proximal LAD on 1/13. LHC revealed total thrombotic occlusion of proximal LAD. LVEF 35-40% with anterior and apical wall hypokinesis. LVEDP was 27 mmHg at the beginning and 22 mmHg at the end after 40 mg IV Lasix. Echo 1/14 with EF 35-40%.  - Cardiology following, appreciate recs:   - Amio gtt parked at 30   - Wean levo as able  - metoprolol 12.5mg BID  - ASA 81 daily  - Crestor 40mg nightly  - Brilinta 90mg BID  - Tylenol 650mg q4h PRN    #Cardiac Arrest 2/2 Vfib (1/14)  Early AM 1/14, patient arrested 3 times with Vfib. NSR achieved with defibrillation. Patient ultimately intubated. EKG with NSR, no ST changes, u waves. PM RFP showed K of 3.0, improved to 4.0 on AM POCT with repletion through PIV and R fem CVC.  - Cardiology following, appreciate recs   - Amio gtt parked at 30  - RFP, Mg q4h  - Goals: K >4, Mg >2  - Kphos 500mg BID  - replete lytes as needed    #Afib with RVR, new onset  Patient had 1 episode of Afib in cath lab. No prior history of arrhythmia. No additional runs on tele or EKG since admission. Likely 2/2 STEMI and elevated LVEDP. Previously on heparin gtt, dc'd on 1/13.  - amio gtt parked at 30  - Continuous tele    Pulmonary  #Acute Hypoxemic Respiratory Failure,  resolved  CXR on admission showed severe pulmonary edema. Improved on BiPAP and transitioned to HFNC. Now intubated s/p cardiac arrest x3 overnight into 1/14.  CXR on 1/13 and this AM showing LLL opacities, unchanged. Patient intubated from 1/14-1/16.  - patient currently on home BiPAP    Infectious  #Leukocytosis  WBC 17.5>13.1. Trending down but patient mildly confused after extubation.  Respiratory panel negative  - 
amio, levo, precedex   Abx: N/A   Diet:  Diet NPO Exceptions are: Sips of Water with Meds  GI PPx:    Pepcid 20 mg IV bid  Bowel Regimen:  34g glycolax daily  DVT PPx: SCDs    Code Status: Full Code     DISPO:   Prior to admission:  From home  Current:  ICU  Upon discharge:  TBD    Ni Lemon, M4  Medical Student, [employer]    Patient seen and examined at bedside with medical student (Ni Lemon), agree with student's note with above changes.    Discussed assessment and plan in detail with the attending physician, Dr. Rios.    Yadi Beckman MD  Internal Medicine, PGY-1  Contact via Perfect[employer]    Patient seen, examined and discussed with the resident and I agree with the assessment and plan.    Vent Mode: AC/PRVC Resp Rate (Set): 16 bpm/Vt (Set, mL): 550 mL/ /FiO2 : 40 %  PEEP/CPAP (cmH2O): 5   Peak Inspiratory Pressure (cmH2O): 41 cmH2O  ETCO2 (mmHg): 42 mmHg   Recent Labs     01/14/25  0743 01/15/25  1724   PHART 7.467* 7.384   [id number] 45.4* 51.8*   PO2ART 177.2* 119.5*     Remains Intubated and sedated, and on pressors.  Electrolytes have been where we want them.  No arrhythmias in the last 24 hours.  Will try get his sedation down and perform an SBT.      Critical care time spent reviewing labs/films, examining patient, collaborating with other physicians but excluding procedures for life threatening organ failure is 32 minutes.      Ni Lemon   
(no value, not in operation)   Recent Labs     01/13/25  0025 01/14/25  0743   PHART 7.390 7.467*   [id number] 46.8* 45.4*   PO2ART 63.6* 177.2*     Remains Intubated and sedated, and on pressors.  Electrolytes have been where we want them.  No arrhythmias in the last 24 hours.  Will try get his sedation down and perform an SBT.      Critical care time spent reviewing labs/films, examining patient, collaborating with other physicians but excluding procedures for life threatening organ failure is 32 minutes.      Shawn Rios MD

## 2025-01-22 ENCOUNTER — CARE COORDINATION (OUTPATIENT)
Dept: CASE MANAGEMENT | Age: 70
End: 2025-01-22

## 2025-01-22 DIAGNOSIS — I21.02 ST ELEVATION MYOCARDIAL INFARCTION INVOLVING LEFT ANTERIOR DESCENDING (LAD) CORONARY ARTERY (HCC): Primary | ICD-10-CM

## 2025-01-22 PROCEDURE — 1111F DSCHRG MED/CURRENT MED MERGE: CPT | Performed by: STUDENT IN AN ORGANIZED HEALTH CARE EDUCATION/TRAINING PROGRAM

## 2025-01-22 NOTE — CARE COORDINATION
Care Transitions Note    Initial Call - Call within 2 business days of discharge: Yes    Patient Current Location:  Home: [address] [place] [address] [place] [address]    Care Transition Nurse contacted the patient, spouse/partner  by telephone to perform post hospital discharge assessment, verified name and  as identifiers. Provided introduction to self, and explanation of the Care Transition Nurse role.     Patient: Jan Devine    Patient : 1955   MRN: [id number]     Reason for Admission: STEMI, S/P LHC, A-Fib, Acute pulmonary edema, Acute respiratory failure with Hypoxia, DM 2  Discharge Date: 25    RURS: Readmission Risk Score: 10.7      Last Discharge Facility       Date Complaint Diagnosis Description Type Department Provider    25 Chest Pain ST elevation myocardial infarction (STEMI), unspecified artery (HCC) ... ED to Hosp-Admission (Discharged) (ADMIT) [facility] Buster Woodward MD; Ewa Fuentes...            Was this an external facility discharge? No    Additional needs identified to be addressed with provider   No needs identified             Method of communication with provider: none.    Patients top risk factors for readmission: medical condition-STEMI, S/P LHC, A-Fib, Acute pulmonary edema, Acute respiratory failure with Hypoxia, DM 2    Interventions to address risk factors:   Education: patient and spouse instructed to continue to monitor for any returning SOB/SOBE, heart palpitations, any BLE Edema, any weight gain of 3 lbs overnight, 5 lbs in a week, reporting to MD immediately.    Review of patient management of conditions/medications: make sure to rest as needed, make sure patient is taking all medications as prescribed, weights daily, BP monitoring at least daily.     Care Summary Note: CTN spoke with patient and spouse this am for initial 24 hour discharge follow up CTN call.  Spouse and patient both state patient is not having any fever, chills, nausea, vomiting, chest pain,

## 2025-01-23 ENCOUNTER — OFFICE VISIT (OUTPATIENT)
Dept: CARDIOLOGY CLINIC | Age: 70
End: 2025-01-23
Payer: MEDICARE

## 2025-01-23 VITALS
SYSTOLIC BLOOD PRESSURE: 102 MMHG | BODY MASS INDEX: 34.16 KG/M2 | WEIGHT: 244 LBS | HEART RATE: 60 BPM | HEIGHT: 71 IN | DIASTOLIC BLOOD PRESSURE: 70 MMHG

## 2025-01-23 DIAGNOSIS — I24.9 ACUTE CORONARY SYNDROME (HCC): Primary | ICD-10-CM

## 2025-01-23 DIAGNOSIS — I42.9 CARDIOMYOPATHY, UNSPECIFIED TYPE (HCC): ICD-10-CM

## 2025-01-23 DIAGNOSIS — R06.02 SHORTNESS OF BREATH: ICD-10-CM

## 2025-01-23 DIAGNOSIS — I48.0 PAROXYSMAL ATRIAL FIBRILLATION (HCC): ICD-10-CM

## 2025-01-23 PROCEDURE — 1111F DSCHRG MED/CURRENT MED MERGE: CPT | Performed by: NURSE PRACTITIONER

## 2025-01-23 PROCEDURE — 93000 ELECTROCARDIOGRAM COMPLETE: CPT | Performed by: NURSE PRACTITIONER

## 2025-01-23 PROCEDURE — 99215 OFFICE O/P EST HI 40 MIN: CPT | Performed by: NURSE PRACTITIONER

## 2025-01-23 PROCEDURE — 1159F MED LIST DOCD IN RCRD: CPT | Performed by: NURSE PRACTITIONER

## 2025-01-23 PROCEDURE — 3017F COLORECTAL CA SCREEN DOC REV: CPT | Performed by: NURSE PRACTITIONER

## 2025-01-23 PROCEDURE — 3074F SYST BP LT 130 MM HG: CPT | Performed by: NURSE PRACTITIONER

## 2025-01-23 PROCEDURE — 1160F RVW MEDS BY RX/DR IN RCRD: CPT | Performed by: NURSE PRACTITIONER

## 2025-01-23 PROCEDURE — 3078F DIAST BP <80 MM HG: CPT | Performed by: NURSE PRACTITIONER

## 2025-01-23 PROCEDURE — 1123F ACP DISCUSS/DSCN MKR DOCD: CPT | Performed by: NURSE PRACTITIONER

## 2025-01-23 PROCEDURE — G8417 CALC BMI ABV UP PARAM F/U: HCPCS | Performed by: NURSE PRACTITIONER

## 2025-01-23 PROCEDURE — 1036F TOBACCO NON-USER: CPT | Performed by: NURSE PRACTITIONER

## 2025-01-23 PROCEDURE — G8427 DOCREV CUR MEDS BY ELIG CLIN: HCPCS | Performed by: NURSE PRACTITIONER

## 2025-01-23 RX ORDER — AMIODARONE HYDROCHLORIDE 200 MG/1
200 TABLET ORAL DAILY
Qty: 30 TABLET | Refills: 3 | Status: SHIPPED | OUTPATIENT
Start: 2025-01-23

## 2025-01-23 RX ORDER — AMIODARONE HYDROCHLORIDE 200 MG/1
200 TABLET ORAL 2 TIMES DAILY
COMMUNITY
End: 2025-01-23

## 2025-01-23 NOTE — PROGRESS NOTES
COVID 1/24/2022    Diabetes mellitus (HCC)     Gastroesophageal reflux disease without esophagitis     Hypertension     Pneumonia due to COVID-19 virus     Rash      Social History:    Social History     Tobacco Use   Smoking Status Never   Smokeless Tobacco Never     Current Medications:  Current Outpatient Medications   Medication Sig Dispense Refill    aspirin 81 MG EC tablet Take 1 tablet by mouth daily 30 tablet 3    digoxin (LANOXIN) 125 MCG tablet Take 1 tablet by mouth daily 30 tablet 3    metoprolol succinate (TOPROL XL) 50 MG extended release tablet Take 1 tablet by mouth at bedtime 30 tablet 3    nitroGLYCERIN (NITROSTAT) 0.4 MG SL tablet Place 1 tablet under the tongue every 5 minutes as needed for Chest pain Indications: Chest Pain up to max of 3 total doses. If no relief after 1 dose, call 911. 25 tablet 3    rosuvastatin (CRESTOR) 40 MG tablet Take 1 tablet by mouth nightly 30 tablet 3    spironolactone (ALDACTONE) 25 MG tablet Take 1.5 tablets by mouth daily 45 tablet 3    valsartan (DIOVAN) 40 MG tablet Take 1 tablet by mouth daily 30 tablet 3    empagliflozin (JARDIANCE) 10 MG tablet Take 1 tablet by mouth daily 30 tablet 3    ticagrelor (BRILINTA) 90 MG TABS tablet Take 1 tablet by mouth 2 times daily 60 tablet 3    phosphorus (K PHOS NEUTRAL) 155-852-130 MG tablet Take 2 tablets by mouth 2 times daily 60 tablet 3    furosemide (LASIX) 20 MG tablet Take 1 tablet by mouth as needed (For weight gain of 3+ lbs in 24 hours or 5+ lbs in a week.) 90 tablet 1    glucose monitoring kit 1 kit by Does not apply route daily 1 kit 0    [employer] Lancets MISC 1 each by Does not apply route daily 100 each 3    Continuous Glucose Sensor (FREESTYLE SHAISTA 3 SENSOR) [facility] Use daily as needed. 4 each 3    metFORMIN (GLUCOPHAGE-XR) 500 MG extended release tablet Take 1 tablet by mouth daily (with breakfast) 90 tablet 1     No current facility-administered medications for this visit.     REVIEW OF SYSTEMS:

## 2025-01-23 NOTE — PATIENT INSTRUCTIONS
Fu  with Dr Piper in 2- 3 months   To ED if any angina or cardiac symptoms / has nitro prn   Continue wearing Zoll life vest

## 2025-01-27 ENCOUNTER — OFFICE VISIT (OUTPATIENT)
Dept: INTERNAL MEDICINE CLINIC | Age: 70
End: 2025-01-27

## 2025-01-27 VITALS
HEART RATE: 58 BPM | BODY MASS INDEX: 34.16 KG/M2 | WEIGHT: 244 LBS | OXYGEN SATURATION: 97 % | DIASTOLIC BLOOD PRESSURE: 70 MMHG | HEIGHT: 71 IN | SYSTOLIC BLOOD PRESSURE: 128 MMHG

## 2025-01-27 DIAGNOSIS — I10 ESSENTIAL HYPERTENSION: ICD-10-CM

## 2025-01-27 DIAGNOSIS — E11.9 TYPE 2 DIABETES MELLITUS WITHOUT COMPLICATION, WITHOUT LONG-TERM CURRENT USE OF INSULIN (HCC): ICD-10-CM

## 2025-01-27 DIAGNOSIS — I48.91 NEW ONSET ATRIAL FIBRILLATION (HCC): ICD-10-CM

## 2025-01-27 DIAGNOSIS — Z09 HOSPITAL DISCHARGE FOLLOW-UP: Primary | ICD-10-CM

## 2025-01-27 DIAGNOSIS — I21.02 STEMI INVOLVING LEFT ANTERIOR DESCENDING CORONARY ARTERY (HCC): ICD-10-CM

## 2025-01-27 DIAGNOSIS — I49.01 VENTRICULAR FIBRILLATION: ICD-10-CM

## 2025-01-27 PROBLEM — I21.3 STEMI (ST ELEVATION MYOCARDIAL INFARCTION) (HCC): Status: RESOLVED | Noted: 2025-01-12 | Resolved: 2025-01-27

## 2025-01-27 PROBLEM — I21.3 ST ELEVATION MYOCARDIAL INFARCTION (STEMI) (HCC): Status: ACTIVE | Noted: 2025-01-12

## 2025-01-27 ASSESSMENT — PATIENT HEALTH QUESTIONNAIRE - PHQ9
SUM OF ALL RESPONSES TO PHQ QUESTIONS 1-9: 0
1. LITTLE INTEREST OR PLEASURE IN DOING THINGS: NOT AT ALL
SUM OF ALL RESPONSES TO PHQ QUESTIONS 1-9: 0
SUM OF ALL RESPONSES TO PHQ9 QUESTIONS 1 & 2: 0
2. FEELING DOWN, DEPRESSED OR HOPELESS: NOT AT ALL
SUM OF ALL RESPONSES TO PHQ QUESTIONS 1-9: 0
SUM OF ALL RESPONSES TO PHQ QUESTIONS 1-9: 0

## 2025-01-27 NOTE — ASSESSMENT & PLAN NOTE
Noted in cath lab, likely 2/2 increased filling pressures. Currently asx.  - continue amiodarone for now- currently weaning  - on asa and brilinta

## 2025-01-27 NOTE — ASSESSMENT & PLAN NOTE
Admitted 1/12/25 for STEMI. Presented with vomiting, headache followed by chest pains a few hours later. Wearing lifevest.  Suffered cardiac arrest thought 2/2 hypokalemia during ICU stay.  - has eval for cardiac rehab next week  - continue cardiology follow up  - continue digoxin  - continue jardiance  - lasix  - aspirin  - metoprolol  - nitro PRN  - continue rosuvastatin  - continue spirinolactone  - continue brilinta  - continue valsartan

## 2025-01-27 NOTE — PROGRESS NOTES
long-term current use of insulin (HCC)    Ventricular fibrillation    NSVT (nonsustained ventricular tachycardia) (HCC)    Polymorphic ventricular tachycardia (HCC)       Medications listed as ordered at the time of discharge from hospital     Medication List            Accurate as of January 27, 2025  9:34 AM. If you have any questions, ask your nurse or doctor.                CONTINUE taking these medications      amiodarone 200 MG tablet  Commonly known as: CORDARONE  Take 1 tablet by mouth daily     aspirin 81 MG EC tablet  Take 1 tablet by mouth daily     digoxin 125 MCG tablet  Commonly known as: LANOXIN  Take 1 tablet by mouth daily     empagliflozin 10 MG tablet  Commonly known as: JARDIANCE  Take 1 tablet by mouth daily     FreeStyle Tod 3 Sensor Misc  Use daily as needed.     furosemide 20 MG tablet  Commonly known as: LASIX  Take 1 tablet by mouth as needed (For weight gain of 3+ lbs in 24 hours or 5+ lbs in a week.)     glucose monitoring kit  1 kit by Does not apply route daily     K[employer]ger Lancets Misc  1 each by Does not apply route daily     metFORMIN 500 MG extended release tablet  Commonly known as: GLUCOPHAGE-XR  Take 1 tablet by mouth daily (with breakfast)     metoprolol succinate 50 MG extended release tablet  Commonly known as: TOPROL XL  Take 1 tablet by mouth at bedtime     nitroGLYCERIN 0.4 MG SL tablet  Commonly known as: NITROSTAT  Place 1 tablet under the tongue every 5 minutes as needed for Chest pain Indications: Chest Pain up to max of 3 total doses. If no relief after 1 dose, call 911.     omeprazole 20 MG delayed release capsule  Commonly known as: PRILOSEC     phosphorus 155-852-130 MG tablet  Commonly known as: K PHOS NEUTRAL  Take 2 tablets by mouth 2 times daily     rosuvastatin 40 MG tablet  Commonly known as: CRESTOR  Take 1 tablet by mouth nightly     spironolactone 25 MG tablet  Commonly known as: ALDACTONE  Take 1.5 tablets by mouth daily     ticagrelor 90 MG Tabs

## 2025-01-27 NOTE — ASSESSMENT & PLAN NOTE
Controlled at home. Slightly elevated today. Inpatient they stopped his HCTZ due to cardiac arrest 2/2 hypokalemia, changed to spironolactone.  - discussed limiting sodium and increasing exercise  - continue valsartan  - continue spironolactone.

## 2025-01-27 NOTE — ASSESSMENT & PLAN NOTE
S/p cardiac arrest inpatient. Thought to be 2/2 hypokalemia from lasix in cath lab.   - continue amio  - continue lifevest  - continue cardiology f/u

## 2025-01-29 ENCOUNTER — CARE COORDINATION (OUTPATIENT)
Dept: CASE MANAGEMENT | Age: 70
End: 2025-01-29

## 2025-01-29 NOTE — CARE COORDINATION
Care Transitions Note    Follow Up Call     Attempted to reach patient for transitions of care follow up.  Unable to reach patient.      Outreach Attempts:   1ST CTC attempt to reach Pt regarding recent hospital discharge.  CTC left voice recording with call back number requesting a call back. Will attempt to reach patient again.    Follow Up Appointment:   Future Appointments         Provider Specialty Dept Phone    2/6/2025 12:00 PM CARDIAC REHAB KATINA MATA  Cardiac Rehabilitation [phone]    2/11/2025 11:00 AM (Arrive by 10:45 AM) [facility] ECHO 2 Cardiology [phone]    2/20/2025 11:00 AM Mateo Piper MD Cardiology [phone]    4/29/2025 11:00 AM Ashley Lagunas MD Internal Medicine [phone]            Plan for follow-up call in 6-10 days based on severity of symptoms and risk factors.     Thank You,    Marlys Wang RN  Care Transition Coordinator  Contact Number:[phone]

## 2025-02-06 ENCOUNTER — HOSPITAL ENCOUNTER (OUTPATIENT)
Dept: CARDIAC REHAB | Age: 70
Setting detail: THERAPIES SERIES
Discharge: HOME | End: 2025-02-06
Payer: MEDICARE

## 2025-02-06 PROCEDURE — 93798 PHYS/QHP OP CAR RHAB W/ECG: CPT

## 2025-02-07 ENCOUNTER — CARE COORDINATION (OUTPATIENT)
Dept: CASE MANAGEMENT | Age: 70
End: 2025-02-07

## 2025-02-07 ASSESSMENT — PATIENT HEALTH QUESTIONNAIRE - PHQ9
6. FEELING BAD ABOUT YOURSELF - OR THAT YOU ARE A FAILURE OR HAVE LET YOURSELF OR YOUR FAMILY DOWN: SEVERAL DAYS
4. FEELING TIRED OR HAVING LITTLE ENERGY: NOT AT ALL
8. MOVING OR SPEAKING SO SLOWLY THAT OTHER PEOPLE COULD HAVE NOTICED. OR THE OPPOSITE, BEING SO FIGETY OR RESTLESS THAT YOU HAVE BEEN MOVING AROUND A LOT MORE THAN USUAL: NOT AT ALL
9. THOUGHTS THAT YOU WOULD BE BETTER OFF DEAD, OR OF HURTING YOURSELF: NOT AT ALL
SUM OF ALL RESPONSES TO PHQ QUESTIONS 1-9: 2
7. TROUBLE CONCENTRATING ON THINGS, SUCH AS READING THE NEWSPAPER OR WATCHING TELEVISION: NOT AT ALL
5. POOR APPETITE OR OVEREATING: SEVERAL DAYS
SUM OF ALL RESPONSES TO PHQ QUESTIONS 1-9: 2
3. TROUBLE FALLING OR STAYING ASLEEP: NOT AT ALL
SUM OF ALL RESPONSES TO PHQ QUESTIONS 1-9: 2
SUM OF ALL RESPONSES TO PHQ QUESTIONS 1-9: 2

## 2025-02-07 NOTE — CARDIO/PULMONARY
[facility] Cardiac Rehabilitation Initial Evaluation    Jan Devine       1955     [id number]      Cardiac History    PTCA Stent    Physical Assessment     General Appearance   Height:  71 inches   Weight: 240 lbs     BMI: 33.46    Skin color: Natural        Cardiovascular Assessment  BP Sitting:  Right arm: 100/54   Sitting:  Left arm: 100/54   Standing:       Pain:   Do you have pain?:  no    Pt here for first session of Cardiac Rehab.Reviewed and discussed insurance benefits, pt v/u.  Reviewed Cardiac Rehab Routine and RPE scale, pt v/u.  Developed individual treatment plan and goals set with patient; pt in agreement with plan and no further questions at this time.  Performed six minute walk test.  Next visit scheduled for Monday, February 10, 2024.           Lennie Altamirano RN  2/7/2025

## 2025-02-07 NOTE — CARE COORDINATION
Care Transitions Note    Follow Up Call     Patient Current Location:  Home: [address] [place] [address]   [place] [address]    Care Transition Nurse contacted the patient by telephone. Verified name and  as identifiers.    Additional needs identified to be addressed with provider   No needs identified                 Method of communication with provider: none.    Care Summary Note: CTN spoke with patient this afternoon for follow up CTN call.  Patient states he is doing well, states he had F/U with Cardiologist to arrange set up for Cardiac Rehab.  No reports of any fever, chills, nausea, vomiting, chest pain, SOB or cough.   Patient with no congestion, pain, difficulty emptying bladder, LE edema, feeling lightheaded, dizziness, and heart palpitations.  No other issues or concerns, no new or changed medications at this time.    Plan of care updates since last contact:  Education: patient instructed to continue to monitor for any returning SOB/SOBE, reporting to MD immediately.  Review of patient management of conditions/medications: make sure to rest as needed, take all medications as prescribed.       Advance Care Planning:   Does patient have an Advance Directive: reviewed during previous call, see note.  and   Primary Decision Maker: john watkins - Spouse - [phone]    Secondary Decision Maker: felix watkins - Child - [phone] .    Medication Review:  No changes since last call.     Assessments:  Care Transitions Subsequent and Final Call    Schedule Follow Up Appointment with PCP: Completed  Subsequent and Final Calls  Do you have any ongoing symptoms?: No  Have your medications changed?: No  Do you have any questions related to your medications?: No  Do you currently have any active services?: No  Do you have any needs or concerns that I can assist you with?: No  Identified Barriers: None  Care Transitions Interventions  No Identified Needs  Other Interventions:              Follow Up Appointment:   Reviewed

## 2025-02-11 ENCOUNTER — HOSPITAL ENCOUNTER (OUTPATIENT)
Age: 70
Discharge: HOME | End: 2025-02-13
Payer: MEDICARE

## 2025-02-11 VITALS — DIASTOLIC BLOOD PRESSURE: 70 MMHG | SYSTOLIC BLOOD PRESSURE: 128 MMHG

## 2025-02-11 DIAGNOSIS — I48.0 PAROXYSMAL ATRIAL FIBRILLATION (HCC): ICD-10-CM

## 2025-02-11 DIAGNOSIS — I24.9 ACUTE CORONARY SYNDROME (HCC): ICD-10-CM

## 2025-02-11 DIAGNOSIS — R06.02 SHORTNESS OF BREATH: ICD-10-CM

## 2025-02-11 DIAGNOSIS — I42.9 CARDIOMYOPATHY, UNSPECIFIED TYPE (HCC): ICD-10-CM

## 2025-02-11 LAB
ECHO AO ASC DIAM: 3.5 CM
ECHO AO ROOT DIAM: 3.3 CM
ECHO IVC PROX: 1.4 CM
ECHO LA DIAMETER: 3.8 CM
ECHO LA TO AORTIC ROOT RATIO: 1.15
ECHO LV EDV A2C: 104 ML
ECHO LV EDV A4C: 108 ML
ECHO LV EJECTION FRACTION A2C: 58 %
ECHO LV EJECTION FRACTION A4C: 55 %
ECHO LV EJECTION FRACTION BIPLANE: 59 % (ref 55–100)
ECHO LV ESV A2C: 44 ML
ECHO LV ESV A4C: 48 ML
ECHO LV FRACTIONAL SHORTENING: 27 % (ref 28–44)
ECHO LV INTERNAL DIMENSION DIASTOLIC: 4.1 CM (ref 4.2–5.9)
ECHO LV INTERNAL DIMENSION SYSTOLIC: 3 CM
ECHO LV IVSD: 1.2 CM (ref 0.6–1)
ECHO LV MASS 2D: 171.7 G (ref 88–224)
ECHO LV POSTERIOR WALL DIASTOLIC: 1.2 CM (ref 0.6–1)
ECHO LV RELATIVE WALL THICKNESS RATIO: 0.59

## 2025-02-11 PROCEDURE — C8924 2D TTE W OR W/O FOL W/CON,FU: HCPCS

## 2025-02-11 PROCEDURE — 6360000004 HC RX CONTRAST MEDICATION: Performed by: INTERNAL MEDICINE

## 2025-02-11 RX ADMIN — SULFUR HEXAFLUORIDE 2 ML: 60.7; .19; .19 INJECTION, POWDER, LYOPHILIZED, FOR SUSPENSION INTRAVENOUS; INTRAVESICAL at 11:11

## 2025-02-12 ENCOUNTER — HOSPITAL ENCOUNTER (OUTPATIENT)
Dept: CARDIAC REHAB | Age: 70
Setting detail: THERAPIES SERIES
Discharge: HOME | End: 2025-02-12
Payer: MEDICARE

## 2025-02-12 PROCEDURE — 93798 PHYS/QHP OP CAR RHAB W/ECG: CPT

## 2025-02-13 ENCOUNTER — CARE COORDINATION (OUTPATIENT)
Dept: CASE MANAGEMENT | Age: 70
End: 2025-02-13

## 2025-02-13 LAB
GLUCOSE BLD-MCNC: 100 MG/DL (ref 70–99)
PERFORMED ON: ABNORMAL

## 2025-02-13 NOTE — CARE COORDINATION
Care Transitions Note    Final Call     Attempted to reach patient for transitions of care follow up.  Unable to reach patient.      Outreach Attempts:   1ST CTC attempt to reach Pt regarding recent hospital discharge.  CTC left voice recording with call back number requesting a call back.  CTN will close out CTN episode at this time.    Patient graduated from the Care Transitions program on 02/13/2025.  Patient/family verbalizes confidence in the ability to self-manage at this time. has the ability to self manage at this time. progressing towards self management. .      Handoff:   Patient was not referred to the ACM team due to no additional needs identified.       Care Summary Note: Patient has been scheduled for Cardiac Rehab and had HFU with PCP and Cardiologist.    Assessments:  Care Transitions Subsequent and Final Call    Subsequent and Final Calls  Care Transitions Interventions  Other Interventions:              Upcoming Appointments:    Future Appointments         Provider Specialty Dept Phone    2/14/2025 9:45 AM CARDIAC REHAB [facility] Cardiac Rehabilitation [phone]    2/17/2025 9:45 AM [facility] RE[facility] Cardiac Rehabilitation [phone]    2/19/2025 9:45 AM [facility]AC RE[facility] Cardiac Rehabilitation [phone]    2/20/2025 11:00 AM Mateo Piper MD Cardiology [phone]    2/21/2025 9:45 AM CARD[facility]AC REH[facility] Cardiac Rehabilitation [phone]    2/24/2025 9:45 AM [facility]AC RE[facility] Cardiac Rehabilitation [phone]    2/26/2025 9:45 AM [facility] RE[facility] Cardiac Rehabilitation [phone]    2/27/2025 10:45 AM Meaghan Rosado RD Endocrinology [phone]    2/28/2025 9:45 AM [facility]AC REH[facility] Cardiac Rehabilitation [phone]    3/3/2025 9:45 AM [facility] RE[facility] Cardiac Rehabilitation [phone]    3/5/2025 9:45 AM [facility] RE[facility] Cardiac Rehabilitation [phone]    3/7/2025 9:45 AM CARDIAC

## 2025-02-14 ENCOUNTER — HOSPITAL ENCOUNTER (OUTPATIENT)
Dept: CARDIAC REHAB | Age: 70
Setting detail: THERAPIES SERIES
Discharge: HOME | End: 2025-02-14
Payer: MEDICARE

## 2025-02-14 LAB
GLUCOSE BLD-MCNC: 105 MG/DL (ref 70–99)
PERFORMED ON: ABNORMAL

## 2025-02-14 PROCEDURE — 93798 PHYS/QHP OP CAR RHAB W/ECG: CPT

## 2025-02-17 ENCOUNTER — HOSPITAL ENCOUNTER (OUTPATIENT)
Dept: CARDIAC REHAB | Age: 70
Setting detail: THERAPIES SERIES
Discharge: HOME | End: 2025-02-17
Payer: MEDICARE

## 2025-02-17 DIAGNOSIS — I24.9 ACUTE CORONARY SYNDROME (HCC): ICD-10-CM

## 2025-02-17 DIAGNOSIS — I42.9 CARDIOMYOPATHY, UNSPECIFIED TYPE (HCC): ICD-10-CM

## 2025-02-17 DIAGNOSIS — R06.02 SHORTNESS OF BREATH: ICD-10-CM

## 2025-02-17 DIAGNOSIS — I48.0 PAROXYSMAL ATRIAL FIBRILLATION (HCC): ICD-10-CM

## 2025-02-17 LAB
25(OH)D3 SERPL-MCNC: 18.8 NG/ML
ALBUMIN SERPL-MCNC: 4.2 G/DL (ref 3.4–5)
ALBUMIN/GLOB SERPL: 1.6 {RATIO} (ref 1.1–2.2)
ALP SERPL-CCNC: 90 U/L (ref 40–129)
ALT SERPL-CCNC: 34 U/L (ref 10–40)
ANION GAP SERPL CALCULATED.3IONS-SCNC: 12 MMOL/L (ref 3–16)
AST SERPL-CCNC: 29 U/L (ref 15–37)
BILIRUB DIRECT SERPL-MCNC: 0.3 MG/DL (ref 0–0.3)
BILIRUB INDIRECT SERPL-MCNC: 0.3 MG/DL (ref 0–1)
BILIRUB SERPL-MCNC: 0.6 MG/DL (ref 0–1)
BUN SERPL-MCNC: 15 MG/DL (ref 7–20)
CALCIUM SERPL-MCNC: 9.7 MG/DL (ref 8.3–10.6)
CHLORIDE SERPL-SCNC: 102 MMOL/L (ref 99–110)
CHOLEST SERPL-MCNC: 79 MG/DL (ref 0–199)
CO2 SERPL-SCNC: 28 MMOL/L (ref 21–32)
CREAT SERPL-MCNC: 1.3 MG/DL (ref 0.8–1.3)
DEPRECATED RDW RBC AUTO: 14.7 % (ref 12.4–15.4)
DIGOXIN SERPL-MCNC: 2.1 NG/ML (ref 0.8–2)
EST. AVERAGE GLUCOSE BLD GHB EST-MCNC: 148.5 MG/DL
GFR SERPLBLD CREATININE-BSD FMLA CKD-EPI: 58 ML/MIN/{1.73_M2}
GLUCOSE BLD-MCNC: 99 MG/DL (ref 70–99)
GLUCOSE SERPL-MCNC: 165 MG/DL (ref 70–99)
HBA1C MFR BLD: 6.8 %
HCT VFR BLD AUTO: 45.7 % (ref 40.5–52.5)
HDLC SERPL-MCNC: 37 MG/DL (ref 40–60)
HGB BLD-MCNC: 15.1 G/DL (ref 13.5–17.5)
LDLC SERPL CALC-MCNC: 22 MG/DL
MAGNESIUM SERPL-MCNC: 1.95 MG/DL (ref 1.8–2.4)
MCH RBC QN AUTO: 30.5 PG (ref 26–34)
MCHC RBC AUTO-ENTMCNC: 33 G/DL (ref 31–36)
MCV RBC AUTO: 92.5 FL (ref 80–100)
NT-PROBNP SERPL-MCNC: 1235 PG/ML (ref 0–124)
PERFORMED ON: NORMAL
PLATELET # BLD AUTO: 237 K/UL (ref 135–450)
PMV BLD AUTO: 9 FL (ref 5–10.5)
POTASSIUM SERPL-SCNC: 3.9 MMOL/L (ref 3.5–5.1)
PROT SERPL-MCNC: 6.9 G/DL (ref 6.4–8.2)
RBC # BLD AUTO: 4.94 M/UL (ref 4.2–5.9)
SODIUM SERPL-SCNC: 142 MMOL/L (ref 136–145)
T4 FREE SERPL-MCNC: 1 NG/DL (ref 0.9–1.8)
TRIGL SERPL-MCNC: 98 MG/DL (ref 0–150)
TSH SERPL DL<=0.005 MIU/L-ACNC: 11.5 UIU/ML (ref 0.27–4.2)
VLDLC SERPL CALC-MCNC: 20 MG/DL
WBC # BLD AUTO: 9 K/UL (ref 4–11)

## 2025-02-17 PROCEDURE — 93798 PHYS/QHP OP CAR RHAB W/ECG: CPT

## 2025-02-19 ENCOUNTER — HOSPITAL ENCOUNTER (OUTPATIENT)
Dept: CARDIAC REHAB | Age: 70
Setting detail: THERAPIES SERIES
Discharge: HOME | End: 2025-02-19
Payer: MEDICARE

## 2025-02-19 PROCEDURE — 93798 PHYS/QHP OP CAR RHAB W/ECG: CPT

## 2025-02-19 NOTE — PATIENT INSTRUCTIONS
Thank you for choosing [facility] for your cardiac care.    During your visit today, we reviewed and confirmed your cardiac medications along with  medication prescribed by your other healthcare team members. Please be sure to discuss any  changes to medication with your providers.    Please bring a list of ALL medications (or the bottles) with you to EVERY appointment.  Also include vitamins and over-the-counter medications.    If you need refills for any cardiac medications, please call your pharmacy and they will reach out to us electronically.    Did your provider order testing today? If yes, then you will receive your results in three  possible ways. You can receive a [employer] message, a phone call, or letter in the mail. Please  note, if you are an active [employer] user, some of your testing will be available within 1-2 days.    Finally, please know that it is good for your heart to exercise and follow a healthy, low-fat diet  as advised by your physician and health care providers.    If you are experiencing a medical emergency, please call 911 immediately.    It's easy to register for a [employer] account if you don't already have one. With a [employer]  account you can manage your health record, view test results, schedule appointments and  more.     Dr. Piper's clinical staff can be reached at the following phone number: [phone]    If any cardiac testing is ordered, please contact central scheduling at [phone] to get your test scheduled.     Decrease Amiodarone to 100 mg daily (half a tablet) for next three weeks then stop.   Stop Digoxin   Avoid Sudafed

## 2025-02-19 NOTE — PROGRESS NOTES
[facility]     Outpatient Cardiology         Patient Name:  Jan Devine  Primary Care Physician: Ashley Lagunas MD  02/20/25     Assessment & Plan    Assessment / Plan:     CAD, status post acute anterolateral STEMI on 1/12/2024-mild residual LCx and RCA disease.  No angina.  EF has improved.  Had acute systolic heart failure post MI.  Currently resolved.  Currently wearing LifeVest.  LVEF is back to normal.  Can stop using LifeVest  Stop digoxin.  Will reduce amiodarone to 100 mg daily for the next 3 weeks and then stop the amiodarone.  Has had elevated TSH with normal free T4.  The postop VTE was triggered likely by the severe hypokalemia.    Essential hypertension-controlled.  Continue Toprol-XL, Diovan  Hyperlipidemia-continue Crestor.  Last LDL is now 22.  Continue exercise and activity.  No restrictions in golfing.            Chief Complaint:     Chief Complaint   Patient presents with    Atrial Fibrillation       History of Present Illness:       HPI     Jan Devine is a 69 y.o. male with PMH of STEMI s/p PCI 2025,  DM, paroxysmal Afib, KATINA, HLD and HTN here for a follow up.          Today he reports he is doing well. No shortness of breath, no lower leg edema.  Patient denies any chest pain, shortness of breath, palpitations, presyncope or syncope. No TIA. No claudication. No recent hospitalizations    PMH  Past Medical History:   Diagnosis Date    COVID 1/24/2022    Diabetes mellitus (HCC)     Gastroesophageal reflux disease without esophagitis     Hypertension     Pneumonia due to COVID-19 virus     Rash        PSH  Past Surgical History:   Procedure Laterality Date    CARDIAC PROCEDURE N/A 1/12/2025    Left heart cath / coronary angiography performed by Hero Silva MD at [facility] CARDIAC CATH LAB    CARDIAC PROCEDURE N/A 1/12/2025    Percutaneous coronary intervention performed by Hero Silva MD at [facility] CARDIAC CATH LAB    CARDIAC PROCEDURE N/A 1/12/2025    Intravascular

## 2025-02-20 ENCOUNTER — OFFICE VISIT (OUTPATIENT)
Dept: CARDIOLOGY CLINIC | Age: 70
End: 2025-02-20
Payer: MEDICARE

## 2025-02-20 VITALS
SYSTOLIC BLOOD PRESSURE: 128 MMHG | WEIGHT: 238 LBS | DIASTOLIC BLOOD PRESSURE: 86 MMHG | BODY MASS INDEX: 33.19 KG/M2 | HEART RATE: 69 BPM

## 2025-02-20 DIAGNOSIS — I10 ESSENTIAL HYPERTENSION: ICD-10-CM

## 2025-02-20 DIAGNOSIS — I25.10 ASHD (ARTERIOSCLEROTIC HEART DISEASE): Primary | ICD-10-CM

## 2025-02-20 DIAGNOSIS — E78.5 HYPERLIPIDEMIA, UNSPECIFIED HYPERLIPIDEMIA TYPE: ICD-10-CM

## 2025-02-20 DIAGNOSIS — I25.2 STATUS POST MYOCARDIAL INFARCTION OF ANTERIOR WALL: ICD-10-CM

## 2025-02-20 PROCEDURE — 1036F TOBACCO NON-USER: CPT | Performed by: INTERNAL MEDICINE

## 2025-02-20 PROCEDURE — 3074F SYST BP LT 130 MM HG: CPT | Performed by: INTERNAL MEDICINE

## 2025-02-20 PROCEDURE — 1111F DSCHRG MED/CURRENT MED MERGE: CPT | Performed by: INTERNAL MEDICINE

## 2025-02-20 PROCEDURE — G8427 DOCREV CUR MEDS BY ELIG CLIN: HCPCS | Performed by: INTERNAL MEDICINE

## 2025-02-20 PROCEDURE — 1159F MED LIST DOCD IN RCRD: CPT | Performed by: INTERNAL MEDICINE

## 2025-02-20 PROCEDURE — G8417 CALC BMI ABV UP PARAM F/U: HCPCS | Performed by: INTERNAL MEDICINE

## 2025-02-20 PROCEDURE — 99214 OFFICE O/P EST MOD 30 MIN: CPT | Performed by: INTERNAL MEDICINE

## 2025-02-20 PROCEDURE — 1123F ACP DISCUSS/DSCN MKR DOCD: CPT | Performed by: INTERNAL MEDICINE

## 2025-02-20 PROCEDURE — 1160F RVW MEDS BY RX/DR IN RCRD: CPT | Performed by: INTERNAL MEDICINE

## 2025-02-20 PROCEDURE — 3017F COLORECTAL CA SCREEN DOC REV: CPT | Performed by: INTERNAL MEDICINE

## 2025-02-20 PROCEDURE — 93000 ELECTROCARDIOGRAM COMPLETE: CPT | Performed by: INTERNAL MEDICINE

## 2025-02-20 PROCEDURE — 3079F DIAST BP 80-89 MM HG: CPT | Performed by: INTERNAL MEDICINE

## 2025-02-20 RX ORDER — AMIODARONE HYDROCHLORIDE 100 MG/1
100 TABLET ORAL DAILY
COMMUNITY

## 2025-02-21 ENCOUNTER — HOSPITAL ENCOUNTER (OUTPATIENT)
Dept: CARDIAC REHAB | Age: 70
Setting detail: THERAPIES SERIES
Discharge: HOME | End: 2025-02-21
Payer: MEDICARE

## 2025-02-21 PROCEDURE — 93798 PHYS/QHP OP CAR RHAB W/ECG: CPT

## 2025-02-24 ENCOUNTER — HOSPITAL ENCOUNTER (OUTPATIENT)
Dept: CARDIAC REHAB | Age: 70
Setting detail: THERAPIES SERIES
Discharge: HOME | End: 2025-02-24
Payer: MEDICARE

## 2025-02-24 PROCEDURE — 93798 PHYS/QHP OP CAR RHAB W/ECG: CPT

## 2025-02-26 ENCOUNTER — HOSPITAL ENCOUNTER (OUTPATIENT)
Dept: CARDIAC REHAB | Age: 70
Setting detail: THERAPIES SERIES
Discharge: HOME | End: 2025-02-26
Payer: MEDICARE

## 2025-02-26 PROCEDURE — 93798 PHYS/QHP OP CAR RHAB W/ECG: CPT

## 2025-02-27 ENCOUNTER — OFFICE VISIT (OUTPATIENT)
Dept: ENDOCRINOLOGY | Age: 70
End: 2025-02-27
Payer: MEDICARE

## 2025-02-27 DIAGNOSIS — E11.65 TYPE 2 DIABETES MELLITUS WITH HYPERGLYCEMIA, WITHOUT LONG-TERM CURRENT USE OF INSULIN (HCC): Primary | ICD-10-CM

## 2025-02-27 PROCEDURE — 3044F HG A1C LEVEL LT 7.0%: CPT

## 2025-02-27 PROCEDURE — G8417 CALC BMI ABV UP PARAM F/U: HCPCS

## 2025-02-27 PROCEDURE — G8428 CUR MEDS NOT DOCUMENT: HCPCS

## 2025-02-27 PROCEDURE — 97802 MEDICAL NUTRITION INDIV IN: CPT

## 2025-02-27 NOTE — PROGRESS NOTES
Medical Nutrition Therapy for Diabetes  [facility] Endocrinology    Jan Devine  February 27, 2025    Patient Care Team:  Ashley Lagunas MD as PCP - General (Internal Medicine)  Ashley Lagunas MD as PCP - Empaneled Provider  Calos Ny MD as Consulting Physician (Pulmonology)    Reason for visit: 1. Type 2 diabetes mellitus with hyperglycemia, without long-term current use of insulin (HCC)     Initial     ASSESSMENT/PLAN:   NUTRITION DIAGNOSIS    #1 Problem: Altered Nutrition-Related Laboratory Values (NC-2.2)  Related to: Endocrine/Diabetes   As Evidenced by: Elevated Plasma glucose and/or HgbA1c levels         #2 Problem: Inconsistent Carbohydrate Intake (NI 5.8.4)  Related to: Varied meal timing / incorrect carbohydrate counting  As Evidenced by: fluctuation in blood glucose levels / food recall    #3 Problem: Inadequate Fluid Intake (NI-3.1)  Related to: decreased thirst sensation/inability to access fluid independently  As evidences by: Fluid consumption reported under 64 oz per day.    NUTRITION INTERVENTION  Nutrition Prescription: 45 grams carbohydrate per meal with protein and non-starch vegetables  15 gram carbohydrate snacks, reduce saturated fat and sodium intake.     Diabetes Education/Counseling included:  Diabetes disease process:  Explained HgbA1c ranges, reviewed normal/at risk for diabetes/and diabetes diagnosis ranges.    Nutrition Management:  Impacts of fiber, fats, and proteins on blood glucose trending   Benefit of eating protein with each meal/snack reviewed  Reviewed sodium and saturated far sources. Reviewed alternative items to choose to lower Na and saturated fat intake.  Label reading  Activity/Exercise    Monitoring: Glucometer: morning fasting 130-140mg/dL.   Medication Review:  Reviewed proper medication timing    Handouts Provided:  Planning Healthy Meals- NovoNordisk  Cut down on saturated fat- dietary guidelines for americans  Why should I limit sodium?- AHA

## 2025-02-28 ENCOUNTER — HOSPITAL ENCOUNTER (OUTPATIENT)
Dept: CARDIAC REHAB | Age: 70
Setting detail: THERAPIES SERIES
Discharge: HOME | End: 2025-02-28
Payer: MEDICARE

## 2025-02-28 PROCEDURE — 93798 PHYS/QHP OP CAR RHAB W/ECG: CPT

## 2025-03-03 ENCOUNTER — HOSPITAL ENCOUNTER (OUTPATIENT)
Dept: CARDIAC REHAB | Age: 70
Setting detail: THERAPIES SERIES
Discharge: HOME | End: 2025-03-03
Payer: MEDICARE

## 2025-03-03 PROCEDURE — 93798 PHYS/QHP OP CAR RHAB W/ECG: CPT

## 2025-03-05 ENCOUNTER — HOSPITAL ENCOUNTER (OUTPATIENT)
Dept: CARDIAC REHAB | Age: 70
Setting detail: THERAPIES SERIES
Discharge: HOME | End: 2025-03-05
Payer: MEDICARE

## 2025-03-05 PROCEDURE — 93798 PHYS/QHP OP CAR RHAB W/ECG: CPT

## 2025-03-07 ENCOUNTER — HOSPITAL ENCOUNTER (OUTPATIENT)
Dept: CARDIAC REHAB | Age: 70
Setting detail: THERAPIES SERIES
Discharge: HOME | End: 2025-03-07
Payer: MEDICARE

## 2025-03-07 PROCEDURE — 93798 PHYS/QHP OP CAR RHAB W/ECG: CPT

## 2025-03-10 ENCOUNTER — HOSPITAL ENCOUNTER (OUTPATIENT)
Dept: CARDIAC REHAB | Age: 70
Setting detail: THERAPIES SERIES
Discharge: HOME | End: 2025-03-10
Payer: MEDICARE

## 2025-03-10 PROCEDURE — 93798 PHYS/QHP OP CAR RHAB W/ECG: CPT

## 2025-03-12 ENCOUNTER — HOSPITAL ENCOUNTER (OUTPATIENT)
Dept: CARDIAC REHAB | Age: 70
Setting detail: THERAPIES SERIES
Discharge: HOME | End: 2025-03-12
Payer: MEDICARE

## 2025-03-12 PROCEDURE — 93798 PHYS/QHP OP CAR RHAB W/ECG: CPT

## 2025-03-14 ENCOUNTER — HOSPITAL ENCOUNTER (OUTPATIENT)
Dept: CARDIAC REHAB | Age: 70
Setting detail: THERAPIES SERIES
Discharge: HOME | End: 2025-03-14
Payer: MEDICARE

## 2025-03-14 PROCEDURE — 93798 PHYS/QHP OP CAR RHAB W/ECG: CPT

## 2025-03-14 NOTE — TELEPHONE ENCOUNTER
Rx refill request    Medication  phosphorus (K PHOS NEUTRAL) 155-852-130 MG tablet [[id number]]  phosphorus (K PHOS NEUTRAL) [id number] MG tablet [[id number]]    Order Details  Dose: 2 tablet Route: Oral Frequency: 2 TIMES DAILY   Dispense Quantity: 60 tablet Refills: 3          Sig: Take 2 tablets by mouth 2 times daily       Pharmacy    [facility] [id number] - [place], [place] - [address] - P [phone] - F [phone]  [facility] [address] [address] [place] [address]  Phone: [phone]  Fax: [phone]

## 2025-03-17 ENCOUNTER — HOSPITAL ENCOUNTER (OUTPATIENT)
Dept: CARDIAC REHAB | Age: 70
Setting detail: THERAPIES SERIES
Discharge: HOME | End: 2025-03-17
Payer: MEDICARE

## 2025-03-17 PROCEDURE — 93798 PHYS/QHP OP CAR RHAB W/ECG: CPT

## 2025-03-18 ENCOUNTER — TELEPHONE (OUTPATIENT)
Dept: INTERNAL MEDICINE CLINIC | Age: 70
End: 2025-03-18

## 2025-03-18 NOTE — TELEPHONE ENCOUNTER
Pts wife is requesting blood work orders for her husbands appointment 4/29/25. Please call and advise reyes pt should complete blood work before appointment.     [phone]

## 2025-03-19 ENCOUNTER — HOSPITAL ENCOUNTER (OUTPATIENT)
Dept: CARDIAC REHAB | Age: 70
Setting detail: THERAPIES SERIES
Discharge: HOME | End: 2025-03-19
Payer: MEDICARE

## 2025-03-19 PROCEDURE — 93798 PHYS/QHP OP CAR RHAB W/ECG: CPT

## 2025-03-21 ENCOUNTER — HOSPITAL ENCOUNTER (OUTPATIENT)
Dept: CARDIAC REHAB | Age: 70
Setting detail: THERAPIES SERIES
Discharge: HOME | End: 2025-03-21
Payer: MEDICARE

## 2025-03-21 PROCEDURE — 93798 PHYS/QHP OP CAR RHAB W/ECG: CPT

## 2025-03-24 ENCOUNTER — HOSPITAL ENCOUNTER (OUTPATIENT)
Dept: CARDIAC REHAB | Age: 70
Setting detail: THERAPIES SERIES
Discharge: HOME | End: 2025-03-24
Payer: MEDICARE

## 2025-03-24 PROCEDURE — 93798 PHYS/QHP OP CAR RHAB W/ECG: CPT

## 2025-03-26 ENCOUNTER — HOSPITAL ENCOUNTER (OUTPATIENT)
Dept: CARDIAC REHAB | Age: 70
Setting detail: THERAPIES SERIES
Discharge: HOME | End: 2025-03-26
Payer: MEDICARE

## 2025-03-26 PROCEDURE — 93798 PHYS/QHP OP CAR RHAB W/ECG: CPT

## 2025-03-28 ENCOUNTER — HOSPITAL ENCOUNTER (OUTPATIENT)
Dept: CARDIAC REHAB | Age: 70
Setting detail: THERAPIES SERIES
Discharge: HOME | End: 2025-03-28
Payer: MEDICARE

## 2025-03-28 PROCEDURE — 93798 PHYS/QHP OP CAR RHAB W/ECG: CPT

## 2025-03-31 ENCOUNTER — HOSPITAL ENCOUNTER (OUTPATIENT)
Dept: CARDIAC REHAB | Age: 70
Setting detail: THERAPIES SERIES
Discharge: HOME | End: 2025-03-31
Payer: MEDICARE

## 2025-03-31 PROCEDURE — 93798 PHYS/QHP OP CAR RHAB W/ECG: CPT

## 2025-04-02 ENCOUNTER — HOSPITAL ENCOUNTER (OUTPATIENT)
Dept: CARDIAC REHAB | Age: 70
Setting detail: THERAPIES SERIES
Discharge: HOME | End: 2025-04-02
Payer: MEDICARE

## 2025-04-02 PROCEDURE — 93798 PHYS/QHP OP CAR RHAB W/ECG: CPT

## 2025-04-04 ENCOUNTER — HOSPITAL ENCOUNTER (OUTPATIENT)
Dept: CARDIAC REHAB | Age: 70
Setting detail: THERAPIES SERIES
Discharge: HOME | End: 2025-04-04
Payer: MEDICARE

## 2025-04-04 PROCEDURE — 93798 PHYS/QHP OP CAR RHAB W/ECG: CPT

## 2025-04-07 ENCOUNTER — HOSPITAL ENCOUNTER (OUTPATIENT)
Dept: CARDIAC REHAB | Age: 70
Setting detail: THERAPIES SERIES
Discharge: HOME | End: 2025-04-07
Payer: MEDICARE

## 2025-04-07 PROCEDURE — 93798 PHYS/QHP OP CAR RHAB W/ECG: CPT

## 2025-04-09 ENCOUNTER — HOSPITAL ENCOUNTER (OUTPATIENT)
Dept: CARDIAC REHAB | Age: 70
Setting detail: THERAPIES SERIES
Discharge: HOME | End: 2025-04-09
Payer: MEDICARE

## 2025-04-09 PROCEDURE — 93798 PHYS/QHP OP CAR RHAB W/ECG: CPT

## 2025-04-11 ENCOUNTER — HOSPITAL ENCOUNTER (OUTPATIENT)
Dept: CARDIAC REHAB | Age: 70
Setting detail: THERAPIES SERIES
Discharge: HOME | End: 2025-04-11
Payer: MEDICARE

## 2025-04-11 PROCEDURE — 93798 PHYS/QHP OP CAR RHAB W/ECG: CPT

## 2025-04-11 RX ORDER — SPIRONOLACTONE 25 MG/1
37.5 TABLET ORAL DAILY
Qty: 135 TABLET | Refills: 3 | Status: SHIPPED | OUTPATIENT
Start: 2025-04-11

## 2025-04-11 NOTE — TELEPHONE ENCOUNTER
Requested Prescriptions     Pending Prescriptions Disp Refills    spironolactone (ALDACTONE) 25 MG tablet 45 tablet 3     Sig: Take 1.5 tablets by mouth daily            Checked Correct Pharmacy: Yes  Any changes since last refill? No     Number: 45  Refills: 3    Last OV: 2/20/2025 Provider: K[facility]I  Next OV: 7/10/2025 Provider: [facility]I      Last Fill Date: 03/13/2025    Last Labs: 02/17/2025    Lipid:   Lab Results   Component Value Date    CHOL 79 02/17/2025    TRIG 98 02/17/2025    HDL 37 (L) 02/17/2025    LDL 22 02/17/2025    VLDL 20 02/17/2025

## 2025-04-14 ENCOUNTER — HOSPITAL ENCOUNTER (OUTPATIENT)
Dept: CARDIAC REHAB | Age: 70
Setting detail: THERAPIES SERIES
Discharge: HOME | End: 2025-04-14
Payer: MEDICARE

## 2025-04-14 PROCEDURE — 93798 PHYS/QHP OP CAR RHAB W/ECG: CPT

## 2025-04-15 ENCOUNTER — TELEPHONE (OUTPATIENT)
Dept: CARDIOLOGY CLINIC | Age: 70
End: 2025-04-15

## 2025-04-15 NOTE — TELEPHONE ENCOUNTER
Pt's spouse called stating that a TTE was scheduled for 04.2025 they did not schedule. However, the pt had just completed limited TTE 02.2025. Cancelled TTE for 04.2025. If pt DOES need to complete another TTE, pt would like a call to explain. Otherwise no call needed.

## 2025-04-16 ENCOUNTER — HOSPITAL ENCOUNTER (OUTPATIENT)
Dept: CARDIAC REHAB | Age: 70
Setting detail: THERAPIES SERIES
Discharge: HOME | End: 2025-04-16
Payer: MEDICARE

## 2025-04-16 DIAGNOSIS — E11.65 TYPE 2 DIABETES MELLITUS WITH HYPERGLYCEMIA, WITHOUT LONG-TERM CURRENT USE OF INSULIN (HCC): ICD-10-CM

## 2025-04-16 LAB
GLUCOSE BLD-MCNC: 86 MG/DL (ref 70–99)
PERFORMED ON: NORMAL

## 2025-04-16 PROCEDURE — 93798 PHYS/QHP OP CAR RHAB W/ECG: CPT

## 2025-04-16 RX ORDER — METFORMIN HYDROCHLORIDE 500 MG/1
500 TABLET, EXTENDED RELEASE ORAL
Qty: 90 TABLET | Refills: 1 | Status: SHIPPED | OUTPATIENT
Start: 2025-04-16

## 2025-04-16 NOTE — TELEPHONE ENCOUNTER
Pts wife is requesting a refill on medication below for the pt:         metFORMIN (GLUCOPHAGE-XR) 500 MG extended release tablet [[id number]       [facility] [id number] - [place] [address] - P [phone] - F [phone]

## 2025-04-18 ENCOUNTER — TELEPHONE (OUTPATIENT)
Dept: CARDIOLOGY CLINIC | Age: 70
End: 2025-04-18

## 2025-04-18 ENCOUNTER — HOSPITAL ENCOUNTER (OUTPATIENT)
Dept: CARDIAC REHAB | Age: 70
Setting detail: THERAPIES SERIES
Discharge: HOME | End: 2025-04-18
Payer: MEDICARE

## 2025-04-18 PROCEDURE — 93798 PHYS/QHP OP CAR RHAB W/ECG: CPT

## 2025-04-18 NOTE — TELEPHONE ENCOUNTER
Patients wife, Candy called requesting a call back to see if the patient can go to his dentist appt for a cleaning on 4/28. If so, can antibiotics be sent in.     Can reach Candy at [phone]. Can leave vm since she is at work.    Pharmacy  [facility] [id number] - [place], [place] - [address] - P [phone] - F [phone] [address] [place] [address] [place] [address]  Phone: [phone]  Fax: [phone]

## 2025-04-21 ENCOUNTER — HOSPITAL ENCOUNTER (OUTPATIENT)
Dept: CARDIAC REHAB | Age: 70
Setting detail: THERAPIES SERIES
Discharge: HOME | End: 2025-04-21
Payer: MEDICARE

## 2025-04-21 PROCEDURE — 93798 PHYS/QHP OP CAR RHAB W/ECG: CPT

## 2025-04-23 ENCOUNTER — HOSPITAL ENCOUNTER (OUTPATIENT)
Dept: CARDIAC REHAB | Age: 70
Setting detail: THERAPIES SERIES
Discharge: HOME | End: 2025-04-23
Payer: MEDICARE

## 2025-04-23 PROCEDURE — 93798 PHYS/QHP OP CAR RHAB W/ECG: CPT

## 2025-04-25 ENCOUNTER — HOSPITAL ENCOUNTER (OUTPATIENT)
Dept: CARDIAC REHAB | Age: 70
Setting detail: THERAPIES SERIES
Discharge: HOME | End: 2025-04-25
Payer: MEDICARE

## 2025-04-25 PROCEDURE — 93798 PHYS/QHP OP CAR RHAB W/ECG: CPT

## 2025-04-28 ENCOUNTER — HOSPITAL ENCOUNTER (OUTPATIENT)
Dept: CARDIAC REHAB | Age: 70
Setting detail: THERAPIES SERIES
Discharge: HOME | End: 2025-04-28
Payer: MEDICARE

## 2025-04-28 PROCEDURE — 93798 PHYS/QHP OP CAR RHAB W/ECG: CPT

## 2025-04-30 ENCOUNTER — HOSPITAL ENCOUNTER (OUTPATIENT)
Dept: CARDIAC REHAB | Age: 70
Setting detail: THERAPIES SERIES
Discharge: HOME | End: 2025-04-30
Payer: MEDICARE

## 2025-04-30 PROCEDURE — 93798 PHYS/QHP OP CAR RHAB W/ECG: CPT

## 2025-05-02 ENCOUNTER — APPOINTMENT (OUTPATIENT)
Dept: CARDIAC REHAB | Age: 70
End: 2025-05-02
Payer: MEDICARE

## 2025-05-05 ENCOUNTER — APPOINTMENT (OUTPATIENT)
Dept: CARDIAC REHAB | Age: 70
End: 2025-05-05
Payer: MEDICARE

## 2025-05-06 ENCOUNTER — OFFICE VISIT (OUTPATIENT)
Dept: INTERNAL MEDICINE CLINIC | Age: 70
End: 2025-05-06

## 2025-05-06 VITALS
DIASTOLIC BLOOD PRESSURE: 70 MMHG | OXYGEN SATURATION: 96 % | TEMPERATURE: 97.3 F | BODY MASS INDEX: 32.13 KG/M2 | WEIGHT: 230.4 LBS | SYSTOLIC BLOOD PRESSURE: 120 MMHG | HEART RATE: 51 BPM

## 2025-05-06 DIAGNOSIS — E11.65 TYPE 2 DIABETES MELLITUS WITH HYPERGLYCEMIA, WITHOUT LONG-TERM CURRENT USE OF INSULIN (HCC): Primary | ICD-10-CM

## 2025-05-06 DIAGNOSIS — R79.89 ELEVATED TSH: Primary | ICD-10-CM

## 2025-05-06 DIAGNOSIS — I50.32 HEART FAILURE WITH RECOVERED EJECTION FRACTION (HFRECEF) (HCC): ICD-10-CM

## 2025-05-06 DIAGNOSIS — I21.02 ST ELEVATION MYOCARDIAL INFARCTION INVOLVING LEFT ANTERIOR DESCENDING (LAD) CORONARY ARTERY (HCC): ICD-10-CM

## 2025-05-06 DIAGNOSIS — E11.9 TYPE 2 DIABETES MELLITUS WITHOUT COMPLICATION, WITHOUT LONG-TERM CURRENT USE OF INSULIN (HCC): ICD-10-CM

## 2025-05-06 DIAGNOSIS — E11.65 TYPE 2 DIABETES MELLITUS WITH HYPERGLYCEMIA, WITHOUT LONG-TERM CURRENT USE OF INSULIN (HCC): ICD-10-CM

## 2025-05-06 DIAGNOSIS — R53.83 OTHER FATIGUE: ICD-10-CM

## 2025-05-06 DIAGNOSIS — I10 ESSENTIAL HYPERTENSION: ICD-10-CM

## 2025-05-06 PROBLEM — J96.01 ACUTE RESPIRATORY FAILURE WITH HYPOXIA (HCC): Status: RESOLVED | Noted: 2025-01-13 | Resolved: 2025-05-06

## 2025-05-06 PROBLEM — J81.0 ACUTE PULMONARY EDEMA (HCC): Status: RESOLVED | Noted: 2025-01-13 | Resolved: 2025-05-06

## 2025-05-06 RX ORDER — LANCETS 33 GAUGE
EACH MISCELLANEOUS
Qty: 100 EACH | Refills: 3 | Status: SHIPPED | OUTPATIENT
Start: 2025-05-06

## 2025-05-06 RX ORDER — ACYCLOVIR 800 MG/1
TABLET ORAL
Qty: 4 EACH | Refills: 3 | Status: SHIPPED | OUTPATIENT
Start: 2025-05-06

## 2025-05-06 RX ORDER — METFORMIN HYDROCHLORIDE 500 MG/1
500 TABLET, EXTENDED RELEASE ORAL
Qty: 90 TABLET | Refills: 2 | Status: SHIPPED | OUTPATIENT
Start: 2025-05-06

## 2025-05-06 RX ORDER — GLUCOSAMINE HCL/CHONDROITIN SU 500-400 MG
CAPSULE ORAL
Qty: 300 STRIP | Refills: 4 | Status: SHIPPED | OUTPATIENT
Start: 2025-05-06

## 2025-05-06 RX ORDER — LANCETS
1 EACH MISCELLANEOUS DAILY
Qty: 100 EACH | Refills: 3 | Status: SHIPPED | OUTPATIENT
Start: 2025-05-06 | End: 2025-05-06

## 2025-05-06 NOTE — TELEPHONE ENCOUNTER
This was sent today.   Per pharmacy note     Pharmacy comment: icd 10 must be on image.         I have added what I think are the dx codes they need to fill the order. Please review

## 2025-05-06 NOTE — ASSESSMENT & PLAN NOTE
Now with normal EF on recent echo Feb 2025.  Initial diagnosis as a result of STEMI.  - Continue spironolactone  - Continue metoprolol  - Continue Jardiance  - Continue Crestor  - Continue losartan  - Continue cardiology follow-up

## 2025-05-06 NOTE — PROGRESS NOTES
2024-25 season) 09/01/2024    Annual Wellness Visit (Medicare)  09/21/2024    Diabetic retinal exam  12/06/2024        Physical Exam  Constitutional:       General: He is not in acute distress.     Appearance: He is normal weight. He is not toxic-appearing.   HENT:      Head: Normocephalic.   Eyes:      General: No scleral icterus.     Conjunctiva/sclera: Conjunctivae normal.   Cardiovascular:      Rate and Rhythm: Normal rate and regular rhythm.      Pulses: Normal pulses.      Heart sounds: Normal heart sounds.   Pulmonary:      Effort: Pulmonary effort is normal. No respiratory distress.      Breath sounds: Normal breath sounds. No rales.   Abdominal:      General: There is no distension.      Tenderness: There is no abdominal tenderness.   Musculoskeletal:         General: No swelling.      Cervical back: Neck supple. No tenderness.   Lymphadenopathy:      Cervical: No cervical adenopathy.   Skin:     General: Skin is warm and dry.   Neurological:      Mental Status: He is alert and oriented to person, place, and time. Mental status is at baseline.      Gait: Gait normal.   Psychiatric:         Mood and Affect: Mood normal.         ASSESSMENT/PLAN:    Essential hypertension   Controlled. Inpatient they stopped his HCTZ due to cardiac arrest 2/2 hypokalemia, changed to spironolactone.  Has lost weight with Mediterranean diet and cardiac rehab.  - continue valsartan  - continue spironolactone  - Continue metoprolol    Type 2 diabetes mellitus with hyperglycemia, without long-term current use of insulin (HCC)  Controlled on current regimen.  - Continue metformin XR  - Continue Jardiance  - Continue Crestor  - Continue seeing dietitian as this has been helpful    ST elevation myocardial infarction (STEMI) ([facility])   Admitted 1/12/25 for STEMI, s/p HECTOR.  Suffered cardiac arrest thought 2/2 hypokalemia during ICU stay.  - Continue cardiac rehab  - Continue Mediterranean diet  - continue jardiance  - lasix  - aspirin  -

## 2025-05-06 NOTE — ASSESSMENT & PLAN NOTE
Controlled. Inpatient they stopped his HCTZ due to cardiac arrest 2/2 hypokalemia, changed to spironolactone.  Has lost weight with Mediterranean diet and cardiac rehab.  - continue valsartan  - continue spironolactone  - Continue metoprolol

## 2025-05-06 NOTE — ASSESSMENT & PLAN NOTE
Admitted 1/12/25 for STEMI, s/p HECTOR.  Suffered cardiac arrest thought 2/2 hypokalemia during ICU stay.  - Continue cardiac rehab  - Continue Mediterranean diet  - continue jardiance  - lasix  - aspirin  - metoprolol  - Continue spironolactone  - Continue Crestor  - Continue valsartan  - Continue Brilinta  - nitro PRN

## 2025-05-06 NOTE — ASSESSMENT & PLAN NOTE
Controlled on current regimen.  - Continue metformin XR  - Continue Jardiance  - Continue Crestor  - Continue seeing dietitian as this has been helpful

## 2025-05-07 ENCOUNTER — APPOINTMENT (OUTPATIENT)
Dept: CARDIAC REHAB | Age: 70
End: 2025-05-07
Payer: MEDICARE

## 2025-05-07 ENCOUNTER — HOSPITAL ENCOUNTER (OUTPATIENT)
Dept: CARDIAC REHAB | Age: 70
Setting detail: THERAPIES SERIES
Discharge: HOME | End: 2025-05-07
Payer: MEDICARE

## 2025-05-07 PROCEDURE — 93798 PHYS/QHP OP CAR RHAB W/ECG: CPT

## 2025-05-08 NOTE — TELEPHONE ENCOUNTER
I called pharmacy to double check, was told by staff that it was the correct order and the dx codes were correct as well.

## 2025-05-09 ENCOUNTER — APPOINTMENT (OUTPATIENT)
Dept: CARDIAC REHAB | Age: 70
End: 2025-05-09
Payer: MEDICARE

## 2025-05-09 RX ORDER — VALSARTAN 40 MG/1
40 TABLET ORAL DAILY
Qty: 30 TABLET | Refills: 3 | Status: SHIPPED | OUTPATIENT
Start: 2025-05-09

## 2025-05-09 RX ORDER — ASPIRIN 81 MG/1
81 TABLET ORAL DAILY
Qty: 30 TABLET | Refills: 3 | Status: SHIPPED | OUTPATIENT
Start: 2025-05-09

## 2025-05-09 RX ORDER — METOPROLOL SUCCINATE 50 MG/1
50 TABLET, EXTENDED RELEASE ORAL NIGHTLY
Qty: 30 TABLET | Refills: 3 | Status: SHIPPED | OUTPATIENT
Start: 2025-05-09

## 2025-05-09 RX ORDER — ROSUVASTATIN CALCIUM 40 MG/1
40 TABLET, COATED ORAL NIGHTLY
Qty: 30 TABLET | Refills: 3 | Status: SHIPPED | OUTPATIENT
Start: 2025-05-09

## 2025-05-09 NOTE — TELEPHONE ENCOUNTER
Requested Prescriptions     Pending Prescriptions Disp Refills    aspirin 81 MG EC tablet 30 tablet 3     Sig: Take 1 tablet by mouth daily    valsartan (DIOVAN) 40 MG tablet 30 tablet 3     Sig: Take 1 tablet by mouth daily    empagliflozin (JARDIANCE) 10 MG tablet 30 tablet 3     Sig: Take 1 tablet by mouth daily    metoprolol succinate (TOPROL XL) 50 MG extended release tablet 30 tablet 3     Sig: Take 1 tablet by mouth at bedtime    rosuvastatin (CRESTOR) 40 MG tablet 30 tablet 3     Sig: Take 1 tablet by mouth nightly    ticagrelor (BRILINTA) 90 MG TABS tablet 60 tablet 3     Sig: Take 1 tablet by mouth 2 times daily            Checked Correct Pharmacy: Yes    Any changes since last refill? No     Number: 30  Refills: 3    Last OV: 2/20/2025 Provider: [facility]I    Next OV: 7/10/2025 Provider: [facility]    Last Labs:   CBC:  Lab Results   Component Value Date    WBC 9.0 02/17/2025    HGB 15.1 02/17/2025    HCT 45.7 02/17/2025    MCV 92.5 02/17/2025     02/17/2025    LYMPHOPCT 19.0 01/21/2025    RBC 4.94 02/17/2025    MCH 30.5 02/17/2025    MCHC 33.0 02/17/2025    RDW 14.7 02/17/2025           BMP:  Lab Results   Component Value Date/Time     02/17/2025 07:48 AM    K 3.9 02/17/2025 07:48 AM    K 3.6 01/12/2025 06:39 PM     02/17/2025 07:48 AM    CO2 28 02/17/2025 07:48 AM    BUN 15 02/17/2025 07:48 AM    CREATININE 1.3 02/17/2025 07:48 AM    GLUCOSE 165 02/17/2025 07:48 AM    CALCIUM 9.7 02/17/2025 07:48 AM    LABGLOM 58 02/17/2025 07:48 AM    LABGLOM >60 03/21/2024 08:45 AM      CMP:  Lab Results   Component Value Date     02/17/2025    K 3.9 02/17/2025     02/17/2025    CO2 28 02/17/2025    BUN 15 02/17/2025    CREATININE 1.3 02/17/2025    GLUCOSE 165 (H) 02/17/2025    CALCIUM 9.7 02/17/2025    BILITOT 0.6 02/17/2025    ALKPHOS 90 02/17/2025    AST 29 02/17/2025    ALT 34 02/17/2025    LABGLOM 58 (A) 02/17/2025    GFRAA >60 10/12/2022    AGRATIO 1.6 02/17/2025         Liver:  Lab Results

## 2025-05-12 ENCOUNTER — APPOINTMENT (OUTPATIENT)
Dept: CARDIAC REHAB | Age: 70
End: 2025-05-12
Payer: MEDICARE

## 2025-05-14 ENCOUNTER — APPOINTMENT (OUTPATIENT)
Dept: CARDIAC REHAB | Age: 70
End: 2025-05-14
Payer: MEDICARE

## 2025-05-16 ENCOUNTER — APPOINTMENT (OUTPATIENT)
Dept: CARDIAC REHAB | Age: 70
End: 2025-05-16
Payer: MEDICARE

## 2025-05-16 DIAGNOSIS — E11.65 TYPE 2 DIABETES MELLITUS WITH HYPERGLYCEMIA, WITHOUT LONG-TERM CURRENT USE OF INSULIN (HCC): ICD-10-CM

## 2025-05-16 DIAGNOSIS — I10 ESSENTIAL HYPERTENSION: ICD-10-CM

## 2025-05-16 DIAGNOSIS — R53.83 OTHER FATIGUE: ICD-10-CM

## 2025-05-16 DIAGNOSIS — R79.89 ELEVATED TSH: ICD-10-CM

## 2025-05-16 LAB
ANION GAP SERPL CALCULATED.3IONS-SCNC: 9 MMOL/L (ref 3–16)
BUN SERPL-MCNC: 24 MG/DL (ref 7–20)
CALCIUM SERPL-MCNC: 9.3 MG/DL (ref 8.3–10.6)
CHLORIDE SERPL-SCNC: 104 MMOL/L (ref 99–110)
CO2 SERPL-SCNC: 29 MMOL/L (ref 21–32)
CREAT SERPL-MCNC: 1 MG/DL (ref 0.8–1.3)
CREAT UR-MCNC: 97 MG/DL (ref 39–259)
GFR SERPLBLD CREATININE-BSD FMLA CKD-EPI: 80 ML/MIN/{1.73_M2}
GLUCOSE SERPL-MCNC: 120 MG/DL (ref 70–99)
MICROALBUMIN UR DL<=1MG/L-MCNC: 4.76 MG/DL
MICROALBUMIN/CREAT UR: 49.1 MG/G (ref 0–30)
POTASSIUM SERPL-SCNC: 3.9 MMOL/L (ref 3.5–5.1)
SODIUM SERPL-SCNC: 142 MMOL/L (ref 136–145)
T4 FREE SERPL-MCNC: 0.9 NG/DL (ref 0.9–1.8)
TSH SERPL DL<=0.005 MIU/L-ACNC: 7.58 UIU/ML (ref 0.27–4.2)

## 2025-05-18 ENCOUNTER — RESULTS FOLLOW-UP (OUTPATIENT)
Dept: INTERNAL MEDICINE CLINIC | Age: 70
End: 2025-05-18

## 2025-05-19 ENCOUNTER — APPOINTMENT (OUTPATIENT)
Dept: CARDIAC REHAB | Age: 70
End: 2025-05-19
Payer: MEDICARE

## 2025-05-21 ENCOUNTER — APPOINTMENT (OUTPATIENT)
Dept: CARDIAC REHAB | Age: 70
End: 2025-05-21
Payer: MEDICARE

## 2025-05-23 ENCOUNTER — APPOINTMENT (OUTPATIENT)
Dept: CARDIAC REHAB | Age: 70
End: 2025-05-23
Payer: MEDICARE

## 2025-05-28 ENCOUNTER — APPOINTMENT (OUTPATIENT)
Dept: CARDIAC REHAB | Age: 70
End: 2025-05-28
Payer: MEDICARE

## 2025-05-30 ENCOUNTER — APPOINTMENT (OUTPATIENT)
Dept: CARDIAC REHAB | Age: 70
End: 2025-05-30
Payer: MEDICARE

## 2025-07-02 ENCOUNTER — TELEPHONE (OUTPATIENT)
Dept: CARDIOLOGY CLINIC | Age: 70
End: 2025-07-02

## 2025-07-02 NOTE — TELEPHONE ENCOUNTER
The patient's spouse Candy called and would like to know if the patient needs any labs done or an echo before his appointment on 07/10/25. Please advise [phone]

## 2025-07-02 NOTE — PROGRESS NOTES
[facility]     Outpatient Cardiology         Patient Name:  Jan Devine  Primary Care Physician: Ashley Lagunas MD  07/10/25     Assessment & Plan    Assessment / Plan:     CAD status post anterolateral STEMI in January 2025-continue aspirin and Brilinta until next January.  Can switch Brilinta to Plavix if needed based on cost reasons.  Patient will let us know in case he wants to make the switch.  Last LVEF is normal.  Had elevated TSH with normal free T4.  Off amiodarone.  Recommend rechecking thyroid function after next visit with PCP.    Patient had diarrhea.  On K-Phos.  Had hypokalemia post PCI when he was getting Lasix.  Will stop potassium supplement and recheck potassium level in 3 days.  Continue Aldactone.    Continue exercise and activity.  Follow-up as scheduled.                  Chief Complaint:     Chief Complaint   Patient presents with    Coronary Artery Disease       History of Present Illness:       HPI     Jan Devine is a 70 y.o. male with PMH of STEMI s/p PCI 2025,  DM, paroxysmal Afib, KATINA, HLD and HTN here for a follow up.        Today he reports he is doing well.  Has diarrhea.  No chest pain.  No syncope.  Patient denies any chest pain, shortness of breath, palpitations, presyncope or syncope. No TIA. No claudication. No recent hospitalizations    PMH  Past Medical History:   Diagnosis Date    Acute pulmonary edema (HCC) 01/13/2025    Acute respiratory failure with hypoxia (HCC) 01/13/2025    COVID 01/24/2022    Diabetes mellitus (HCC)     Gastroesophageal reflux disease without esophagitis     Hypertension     Pneumonia due to COVID-19 virus     Rash        PSH  Past Surgical History:   Procedure Laterality Date    CARDIAC PROCEDURE N/A 1/12/2025    Left heart cath / coronary angiography performed by Hero Silva MD at [facility] CARDIAC CATH LAB    CARDIAC PROCEDURE N/A 1/12/2025    Percutaneous coronary intervention performed by Hero Silva MD at [facility]

## 2025-07-09 ENCOUNTER — OFFICE VISIT (OUTPATIENT)
Dept: PULMONOLOGY | Age: 70
End: 2025-07-09
Payer: MEDICARE

## 2025-07-09 VITALS
SYSTOLIC BLOOD PRESSURE: 100 MMHG | HEART RATE: 56 BPM | HEIGHT: 71 IN | OXYGEN SATURATION: 96 % | WEIGHT: 232 LBS | BODY MASS INDEX: 32.48 KG/M2 | DIASTOLIC BLOOD PRESSURE: 64 MMHG

## 2025-07-09 DIAGNOSIS — G47.33 OSA (OBSTRUCTIVE SLEEP APNEA): Primary | ICD-10-CM

## 2025-07-09 PROCEDURE — G8417 CALC BMI ABV UP PARAM F/U: HCPCS | Performed by: INTERNAL MEDICINE

## 2025-07-09 PROCEDURE — 1123F ACP DISCUSS/DSCN MKR DOCD: CPT | Performed by: INTERNAL MEDICINE

## 2025-07-09 PROCEDURE — 3078F DIAST BP <80 MM HG: CPT | Performed by: INTERNAL MEDICINE

## 2025-07-09 PROCEDURE — 3017F COLORECTAL CA SCREEN DOC REV: CPT | Performed by: INTERNAL MEDICINE

## 2025-07-09 PROCEDURE — 3074F SYST BP LT 130 MM HG: CPT | Performed by: INTERNAL MEDICINE

## 2025-07-09 PROCEDURE — 99214 OFFICE O/P EST MOD 30 MIN: CPT | Performed by: INTERNAL MEDICINE

## 2025-07-09 PROCEDURE — G8427 DOCREV CUR MEDS BY ELIG CLIN: HCPCS | Performed by: INTERNAL MEDICINE

## 2025-07-09 PROCEDURE — 1036F TOBACCO NON-USER: CPT | Performed by: INTERNAL MEDICINE

## 2025-07-09 PROCEDURE — 1159F MED LIST DOCD IN RCRD: CPT | Performed by: INTERNAL MEDICINE

## 2025-07-09 RX ORDER — OMEPRAZOLE 20 MG/1
20 CAPSULE, DELAYED RELEASE ORAL EVERY EVENING
Qty: 90 CAPSULE | Refills: 1 | Status: SHIPPED | OUTPATIENT
Start: 2025-07-09

## 2025-07-09 ASSESSMENT — ENCOUNTER SYMPTOMS
SHORTNESS OF BREATH: 0
COUGH: 0
WHEEZING: 0
CHEST TIGHTNESS: 0

## 2025-07-09 NOTE — TELEPHONE ENCOUNTER
Pts wife req refill  omeprazole (PRILOSEC) 20 MG delayed release capsule [[id number]]    Pharm-[facility] [id number] - [place], [place] - [facility] [address] - P [phone] - F [phone]

## 2025-07-10 ENCOUNTER — OFFICE VISIT (OUTPATIENT)
Dept: CARDIOLOGY CLINIC | Age: 70
End: 2025-07-10
Payer: MEDICARE

## 2025-07-10 VITALS
HEART RATE: 54 BPM | OXYGEN SATURATION: 97 % | WEIGHT: 232.2 LBS | DIASTOLIC BLOOD PRESSURE: 66 MMHG | SYSTOLIC BLOOD PRESSURE: 118 MMHG | BODY MASS INDEX: 32.39 KG/M2

## 2025-07-10 DIAGNOSIS — I10 ESSENTIAL HYPERTENSION: Primary | ICD-10-CM

## 2025-07-10 DIAGNOSIS — I25.10 ASHD (ARTERIOSCLEROTIC HEART DISEASE): ICD-10-CM

## 2025-07-10 DIAGNOSIS — I25.2 STATUS POST MYOCARDIAL INFARCTION OF ANTERIOR WALL: ICD-10-CM

## 2025-07-10 PROCEDURE — G8427 DOCREV CUR MEDS BY ELIG CLIN: HCPCS | Performed by: INTERNAL MEDICINE

## 2025-07-10 PROCEDURE — 1159F MED LIST DOCD IN RCRD: CPT | Performed by: INTERNAL MEDICINE

## 2025-07-10 PROCEDURE — 3017F COLORECTAL CA SCREEN DOC REV: CPT | Performed by: INTERNAL MEDICINE

## 2025-07-10 PROCEDURE — 1036F TOBACCO NON-USER: CPT | Performed by: INTERNAL MEDICINE

## 2025-07-10 PROCEDURE — 3078F DIAST BP <80 MM HG: CPT | Performed by: INTERNAL MEDICINE

## 2025-07-10 PROCEDURE — 99214 OFFICE O/P EST MOD 30 MIN: CPT | Performed by: INTERNAL MEDICINE

## 2025-07-10 PROCEDURE — 1123F ACP DISCUSS/DSCN MKR DOCD: CPT | Performed by: INTERNAL MEDICINE

## 2025-07-10 PROCEDURE — 3074F SYST BP LT 130 MM HG: CPT | Performed by: INTERNAL MEDICINE

## 2025-07-10 PROCEDURE — 1160F RVW MEDS BY RX/DR IN RCRD: CPT | Performed by: INTERNAL MEDICINE

## 2025-07-10 PROCEDURE — G8417 CALC BMI ABV UP PARAM F/U: HCPCS | Performed by: INTERNAL MEDICINE

## 2025-07-10 RX ORDER — TICAGRELOR 90 MG/1
90 TABLET, FILM COATED ORAL 2 TIMES DAILY
Qty: 180 TABLET | Refills: 3 | Status: SHIPPED | OUTPATIENT
Start: 2025-07-10

## 2025-07-10 RX ORDER — ASPIRIN 81 MG/1
81 TABLET ORAL DAILY
Qty: 90 TABLET | Refills: 3 | Status: SHIPPED | OUTPATIENT
Start: 2025-07-10

## 2025-07-14 DIAGNOSIS — I10 ESSENTIAL HYPERTENSION: ICD-10-CM

## 2025-07-15 ENCOUNTER — TELEPHONE (OUTPATIENT)
Dept: CARDIOLOGY CLINIC | Age: 70
End: 2025-07-15

## 2025-07-15 DIAGNOSIS — I24.9 ACUTE CORONARY SYNDROME (HCC): ICD-10-CM

## 2025-07-15 DIAGNOSIS — I10 ESSENTIAL HYPERTENSION: ICD-10-CM

## 2025-07-15 DIAGNOSIS — R06.02 SHORTNESS OF BREATH: ICD-10-CM

## 2025-07-15 DIAGNOSIS — I48.0 PAROXYSMAL ATRIAL FIBRILLATION (HCC): ICD-10-CM

## 2025-07-15 DIAGNOSIS — I42.9 CARDIOMYOPATHY, UNSPECIFIED TYPE (HCC): Primary | ICD-10-CM

## 2025-07-15 LAB
POTASSIUM SERPL-SCNC: NORMAL MMOL/L (ref 3.5–5.1)
REASON FOR REJECTION: NORMAL
REJECTED TEST: NORMAL

## 2025-07-15 NOTE — TELEPHONE ENCOUNTER
Steffanie from [facility] called and stated the potassium tube was hemolyzed and was unable to run that 1 test.

## 2025-07-16 DIAGNOSIS — I10 ESSENTIAL HYPERTENSION: ICD-10-CM

## 2025-07-16 DIAGNOSIS — I42.9 CARDIOMYOPATHY, UNSPECIFIED TYPE (HCC): ICD-10-CM

## 2025-07-16 DIAGNOSIS — R06.02 SHORTNESS OF BREATH: ICD-10-CM

## 2025-07-16 DIAGNOSIS — I48.0 PAROXYSMAL ATRIAL FIBRILLATION (HCC): ICD-10-CM

## 2025-07-16 DIAGNOSIS — I24.9 ACUTE CORONARY SYNDROME (HCC): ICD-10-CM

## 2025-07-17 ENCOUNTER — TELEPHONE (OUTPATIENT)
Dept: CARDIOLOGY CLINIC | Age: 70
End: 2025-07-17

## 2025-07-17 DIAGNOSIS — I10 ESSENTIAL HYPERTENSION: Primary | ICD-10-CM

## 2025-07-17 LAB — POTASSIUM SERPL-SCNC: 4.3 MMOL/L (ref 3.5–5.1)

## 2025-07-17 NOTE — TELEPHONE ENCOUNTER
The patient's spouse Candy called and would like to know when the patient should get his potassium labs again. Candy states she knows it's normal right now but it has dropped low in the past. Please advise [phone] ok to leave a voicemail.

## 2025-08-19 DIAGNOSIS — I10 ESSENTIAL HYPERTENSION: ICD-10-CM

## 2025-08-19 LAB — POTASSIUM SERPL-SCNC: 4.3 MMOL/L (ref 3.5–5.1)

## 2025-08-20 ENCOUNTER — OFFICE VISIT (OUTPATIENT)
Dept: FAMILY MEDICINE CLINIC | Age: 70
End: 2025-08-20

## 2025-08-20 VITALS
SYSTOLIC BLOOD PRESSURE: 102 MMHG | HEIGHT: 71 IN | HEART RATE: 90 BPM | BODY MASS INDEX: 32.76 KG/M2 | DIASTOLIC BLOOD PRESSURE: 60 MMHG | WEIGHT: 234 LBS | OXYGEN SATURATION: 98 % | TEMPERATURE: 97.1 F

## 2025-08-20 DIAGNOSIS — I10 ESSENTIAL HYPERTENSION: ICD-10-CM

## 2025-08-20 DIAGNOSIS — E11.65 TYPE 2 DIABETES MELLITUS WITH HYPERGLYCEMIA, WITHOUT LONG-TERM CURRENT USE OF INSULIN (HCC): ICD-10-CM

## 2025-08-20 DIAGNOSIS — R79.89 ELEVATED TSH: ICD-10-CM

## 2025-08-20 DIAGNOSIS — K21.9 GASTROESOPHAGEAL REFLUX DISEASE WITHOUT ESOPHAGITIS: ICD-10-CM

## 2025-08-20 DIAGNOSIS — I50.32 HEART FAILURE WITH RECOVERED EJECTION FRACTION (HFRECEF) (HCC): ICD-10-CM

## 2025-08-20 DIAGNOSIS — N40.0 BENIGN PROSTATIC HYPERPLASIA WITHOUT LOWER URINARY TRACT SYMPTOMS: Primary | ICD-10-CM

## 2025-08-20 DIAGNOSIS — E78.2 MIXED HYPERLIPIDEMIA: ICD-10-CM

## 2025-08-20 DIAGNOSIS — E03.9 HYPOTHYROIDISM, UNSPECIFIED TYPE: ICD-10-CM

## 2025-08-20 PROBLEM — I47.29 POLYMORPHIC VENTRICULAR TACHYCARDIA (HCC): Status: RESOLVED | Noted: 2025-01-15 | Resolved: 2025-08-20

## 2025-08-20 PROBLEM — I49.01 VENTRICULAR FIBRILLATION (HCC): Status: RESOLVED | Noted: 2025-01-14 | Resolved: 2025-08-20

## 2025-08-20 PROBLEM — E66.01 SEVERE OBESITY (BMI 35.0-39.9) WITH COMORBIDITY (HCC): Status: RESOLVED | Noted: 2023-09-21 | Resolved: 2025-08-20

## 2025-08-20 PROBLEM — I47.29 NSVT (NONSUSTAINED VENTRICULAR TACHYCARDIA) (HCC): Status: RESOLVED | Noted: 2025-01-15 | Resolved: 2025-08-20

## 2025-08-20 PROBLEM — I21.3 ST ELEVATION MYOCARDIAL INFARCTION (STEMI) (HCC): Status: RESOLVED | Noted: 2025-01-12 | Resolved: 2025-08-20

## 2025-08-20 PROBLEM — I48.91 NEW ONSET ATRIAL FIBRILLATION (HCC): Status: RESOLVED | Noted: 2025-01-13 | Resolved: 2025-08-20

## 2025-08-20 RX ORDER — TAMSULOSIN HYDROCHLORIDE 0.4 MG/1
0.4 CAPSULE ORAL DAILY
Qty: 90 CAPSULE | Refills: 1 | Status: SHIPPED | OUTPATIENT
Start: 2025-08-20

## 2025-08-25 ENCOUNTER — OFFICE VISIT (OUTPATIENT)
Dept: ENDOCRINOLOGY | Age: 70
End: 2025-08-25
Payer: MEDICARE

## 2025-08-25 DIAGNOSIS — E11.65 TYPE 2 DIABETES MELLITUS WITH HYPERGLYCEMIA, WITHOUT LONG-TERM CURRENT USE OF INSULIN (HCC): Primary | ICD-10-CM

## 2025-08-25 PROCEDURE — G8427 DOCREV CUR MEDS BY ELIG CLIN: HCPCS

## 2025-08-25 PROCEDURE — 97803 MED NUTRITION INDIV SUBSEQ: CPT

## 2025-08-25 PROCEDURE — G8417 CALC BMI ABV UP PARAM F/U: HCPCS

## 2025-08-25 PROCEDURE — 3044F HG A1C LEVEL LT 7.0%: CPT

## 2025-08-28 RX ORDER — VALSARTAN 40 MG/1
40 TABLET ORAL DAILY
Qty: 90 TABLET | Refills: 3 | Status: SHIPPED | OUTPATIENT
Start: 2025-08-28

## 2025-09-05 RX ORDER — EMPAGLIFLOZIN 10 MG/1
10 TABLET, FILM COATED ORAL DAILY
Qty: 90 TABLET | Refills: 3 | Status: SHIPPED | OUTPATIENT
Start: 2025-09-05

## 2025-09-05 RX ORDER — METOPROLOL SUCCINATE 50 MG/1
50 TABLET, EXTENDED RELEASE ORAL NIGHTLY
Qty: 90 TABLET | Refills: 3 | Status: SHIPPED | OUTPATIENT
Start: 2025-09-05

## 2025-09-05 RX ORDER — ROSUVASTATIN CALCIUM 40 MG/1
TABLET, COATED ORAL
Qty: 90 TABLET | Refills: 3 | Status: SHIPPED | OUTPATIENT
Start: 2025-09-05

## (undated) DEVICE — CATH BLLN ANGIO 4X15MM NC EUPHORIA RX

## (undated) DEVICE — CATHETER COR DIAG PIGTAILS PIG 145 CRV 5FR 110CM 6 SIDE H

## (undated) DEVICE — PAD, DEFIB, ADULT, RADIOTRANS, PHYSIO: Brand: MEDLINE

## (undated) DEVICE — CATH BLLN ANGIO 4.50X12MM NC EUPHORIA RX

## (undated) DEVICE — TR BAND RADIAL ARTERY COMPRESSION DEVICE: Brand: TR BAND

## (undated) DEVICE — Device: Brand: PROWATER

## (undated) DEVICE — CATHETER ANGIOPLSTY BAL L12MM DIA2MM GWIRE 0.014IN RAP EXCHG

## (undated) DEVICE — GUIDEWIRE VASC L145CM DIA0035IN FLPY TIP EXTN COMPATIBLE STR

## (undated) DEVICE — CATH LAB PACK: Brand: MEDLINE INDUSTRIES, INC.

## (undated) DEVICE — RUNTHROUGH NS IZANAI PTCA GUIDEWIRE: Brand: RUNTHROUGH

## (undated) DEVICE — CATH BLLN ANGIO 2.50X10MM SC EUPHORA RX

## (undated) DEVICE — CATHETER ANGIOPLSTY BAL L12MM DIA1.5MM GWIRE 0.014IN RAP

## (undated) DEVICE — GLIDESHEATH SLENDER STAINLESS STEEL KIT: Brand: GLIDESHEATH SLENDER

## (undated) DEVICE — CORONARY IMAGING CATHETER: Brand: OPTICROSS™ 6 HD

## (undated) DEVICE — CATHETER GUIDE AD 6FR L100CM COR PERIPH GRN NYL PTFE XB L 3

## (undated) DEVICE — CATHETER DIAG 5FR L100CM LUMN ID0.047IN JR4 CRV 0 SIDE H